# Patient Record
Sex: MALE | Race: WHITE | Employment: OTHER | URBAN - NONMETROPOLITAN AREA
[De-identification: names, ages, dates, MRNs, and addresses within clinical notes are randomized per-mention and may not be internally consistent; named-entity substitution may affect disease eponyms.]

---

## 2019-05-20 PROBLEM — F10.90 ALCOHOL INTAKE ABOVE RECOMMENDED SENSIBLE LIMITS: Status: ACTIVE | Noted: 2019-05-20

## 2019-05-20 PROBLEM — I47.19 MULTIFOCAL ATRIAL TACHYCARDIA: Status: ACTIVE | Noted: 2019-05-20

## 2019-05-20 PROBLEM — Z72.0 TOBACCO USER: Status: ACTIVE | Noted: 2019-05-20

## 2019-05-20 PROBLEM — R91.8 LUNG MASS: Status: ACTIVE | Noted: 2019-05-20

## 2019-05-20 PROBLEM — J44.9 CHRONIC OBSTRUCTIVE PULMONARY DISEASE (HCC): Status: ACTIVE | Noted: 2019-05-20

## 2019-05-20 PROBLEM — I47.1 MULTIFOCAL ATRIAL TACHYCARDIA (HCC): Status: ACTIVE | Noted: 2019-05-20

## 2019-05-20 RX ORDER — IPRATROPIUM BROMIDE AND ALBUTEROL SULFATE 2.5; .5 MG/3ML; MG/3ML
SOLUTION RESPIRATORY (INHALATION)
COMMUNITY
Start: 2018-11-05 | End: 2020-01-06 | Stop reason: SDUPTHER

## 2019-05-20 RX ORDER — DILTIAZEM HYDROCHLORIDE 120 MG/1
120 CAPSULE, EXTENDED RELEASE ORAL DAILY
COMMUNITY
Start: 2018-11-05 | End: 2019-10-14 | Stop reason: SDUPTHER

## 2019-05-20 RX ORDER — ALBUTEROL SULFATE 90 UG/1
AEROSOL, METERED RESPIRATORY (INHALATION)
COMMUNITY
Start: 2018-11-07 | End: 2021-09-27 | Stop reason: SDUPTHER

## 2019-05-20 SDOH — HEALTH STABILITY: MENTAL HEALTH: HOW MANY STANDARD DRINKS CONTAINING ALCOHOL DO YOU HAVE ON A TYPICAL DAY?: 3 OR 4

## 2019-05-20 SDOH — HEALTH STABILITY: MENTAL HEALTH: HOW OFTEN DO YOU HAVE A DRINK CONTAINING ALCOHOL?: 4 OR MORE TIMES A WEEK

## 2019-05-20 NOTE — PROGRESS NOTES
2019    Name: Blanka Jewell : 1955 Sex: male  Age: 75 y.o. Subjective:  Chief Complaint   Patient presents with    Other     6 wk follow up (breathing problems.) ; symptoms improved        HPI: This 75y.o. -year-old male  presents today for evaluation and management of his  chronic medical problems. Current medication list reviewed. The patient is tolerating all medications well without adverse events or known side effects. The patient does understand the risk and benefits of the prescribed medications. Patient was seen here in late March and 2019 with shortness of breath and cough. CT scan of his chest was done which revealed patchy opacifications in both bases suggestive of viral pneumonitis. He also had a stable 15 cm spiculated mass which was felt by pulmonologist to be secondary to scarring. This had not changed in size from last year. After treatment he feels much better. He has an appointment with Dr. Nicolette Vega next Thursday. He wants something less expensive  than the Advair. He will not be on Medicare B until after  2019. He will need some health maintenance done after that. He promises to come back in follow-up. He has a history of chronic hypoxic respiratory failure on oxygen. He only uses the oxygen on a when necessary basis during the day but he does sleep with it at night. He has a history of COPD and uses his inhalers when necessary but he uses his DuoNeb 3 times a day. I told him to just use  DuoNebs  as needed    History of multifocal atrial tachycardia when he was sick. Since then he has had no palpitations. He remains on diltiazem. He quit alcohol in November when he gets sick. Prior to that he was a fairly heavy drinker. He also quit smoking at that time.  For health maintenance he will be due for his aortic screen for aneurysm, he'll need a colonoscopy, he'll need Shimek's, and DTaP, but after 13 was given in 2018 so he wanted \"Pneumovax on November 2019.    He occasionally complains of myalgias and arthralgias as his Hytrin to get up when he gets in his hands and knees. No hot red swollen joints. Review of Systems   Constitutional: Negative for appetite change, fatigue and unexpected weight change. HENT: Negative for congestion, ear pain, facial swelling, rhinorrhea, sore throat, tinnitus and trouble swallowing. Eyes: Negative for photophobia, pain, discharge, itching and visual disturbance. Respiratory: Positive for shortness of breath. Negative for cough, wheezing and stridor. Cardiovascular: Negative for chest pain, palpitations and leg swelling. Gastrointestinal: Negative for abdominal pain, anal bleeding, blood in stool, constipation, diarrhea, nausea and vomiting. Endocrine: Negative for cold intolerance, heat intolerance, polydipsia, polyphagia and polyuria. Genitourinary: Negative for difficulty urinating, dysuria, flank pain, frequency, hematuria and urgency. Musculoskeletal: Positive for arthralgias and myalgias. Negative for gait problem and joint swelling. Skin: Negative for color change, pallor and rash. Allergic/Immunologic: Negative for environmental allergies and food allergies. Neurological: Negative for dizziness, tremors, seizures, syncope, speech difficulty, weakness, light-headedness, numbness and headaches. Hematological: Negative for adenopathy. Does not bruise/bleed easily. Psychiatric/Behavioral: Negative for agitation, behavioral problems, confusion, sleep disturbance and suicidal ideas. The patient is not nervous/anxious.            Current Outpatient Medications:     diltiazem (CARDIZEM 12 HR) 120 MG extended release capsule, Take by mouth, Disp: , Rfl:     fluticasone-salmeterol (ADVAIR DISKUS) 100-50 MCG/DOSE diskus inhaler, Inhale into the lungs, Disp: , Rfl:     ipratropium-albuterol (DUONEB) 0.5-2.5 (3) MG/3ML SOLN nebulizer solution, , Disp: , Rfl:     albuterol sulfate HFA (PROAIR HFA) 108 (80 Base) MCG/ACT inhaler, Inhale into the lungs, Disp: , Rfl:      No Known Allergies     Past Medical History:   Diagnosis Date    Alcohol abuse     COPD (chronic obstructive pulmonary disease) (HCC)     Nicotine dependence        Health Maintenance Due   Topic Date Due    AAA screen  1955    Hepatitis C screen  1955    DTaP/Tdap/Td vaccine (1 - Tdap) 1972    Lipid screen  1993    Shingles Vaccine (1 of 2) 2003    Colon cancer screen colonoscopy  2003    Low dose CT lung screening  2008        Patient Active Problem List   Diagnosis    Chronic obstructive pulmonary disease ([facility] [address]    Alcohol intake above recommended sensible limits    Lung mass    Multifocal atrial tachycardia ([facility] [address]    Tobacco user        History reviewed. No pertinent surgical history. Family History   Problem Relation Age of Onset    No Known Problems Mother     Heart Attack Father         Social History     Tobacco Use    Smoking status: Former Smoker     Packs/day: 1.00     Years: 40.00     Pack years: 40.00     Last attempt to quit: 2018     Years since quittin.3    Smokeless tobacco: Never Used   Substance Use Topics    Alcohol use: Not Currently     Frequency: Never    Drug use: Never        Objective  Vitals:    19 1308   BP: 122/70   Site: Left Upper Arm   Position: Sitting   Cuff Size: Large Adult   Pulse: 89   Resp: 18   Temp: 98.6 °F (37 °C)   TempSrc: Temporal   SpO2: 96%  Comment: Pt is currently on O2, 2LPM.   Weight: 150 lb 3.2 oz (68.1 kg)   Height: 5' 7\" (1.702 m)        Exam:  Physical Exam   Constitutional: He is oriented to person, place, and time. He appears well-developed and well-nourished. HENT:   Head: Normocephalic. Right Ear: External ear normal.   Left Ear: External ear normal.   Nose: Nose normal.   Mouth/Throat: Oropharynx is clear and moist. No oropharyngeal exudate. Eyes: Pupils are equal, round, and reactive to light.  Conjunctivae and EOM are normal. Right eye exhibits no discharge. Left eye exhibits no discharge. No scleral icterus. Neck: Normal range of motion. Neck supple. No thyromegaly present. Cardiovascular: Normal rate, regular rhythm, normal heart sounds and intact distal pulses. Exam reveals no gallop and no friction rub. No murmur heard. Pulmonary/Chest: Effort normal. No respiratory distress. He has decreased breath sounds. He has no wheezes. He has no rales. He exhibits no tenderness. Abdominal: Soft. Bowel sounds are normal. He exhibits no distension and no mass. There is no tenderness. There is no rebound and no guarding. Musculoskeletal: Normal range of motion. He exhibits no edema, tenderness or deformity. Lymphadenopathy:     He has no cervical adenopathy. Neurological: He is alert and oriented to person, place, and time. He displays normal reflexes. No cranial nerve deficit or sensory deficit. Skin: Skin is warm and dry. No rash noted. No erythema. No pallor. Psychiatric: He has a normal mood and affect. His behavior is normal. Judgment and thought content normal.        Last labs reviewed. ASSESSMENT & PLAN :   Problem List        Circulatory    Multifocal atrial tachycardia (HCC)     Continue diltiazem         Relevant Medications    diltiazem (CARDIZEM 12 HR) 120 MG extended release capsule       Respiratory    Chronic obstructive pulmonary disease ([address]     He is to use Duonebs when necessary. Talk with pulmonologist about a less expensive substitution for Advair. Continue using his oxygen as directed.  Continue staying off his alcohol and cigarettes         Relevant Medications    albuterol sulfate HFA (PROAIR HFA) 108 (90 Base) MCG/ACT inhaler    fluticasone-salmeterol (ADVAIR DISKUS) 100-50 MCG/DOSE diskus inhaler    ipratropium-albuterol (DUONEB) 0.5-2.5 (3) MG/3ML SOLN nebulizer solution       Other    Lung mass     Stable on most recent CT of his chest. Will continue to monitor Return in about 3 months (around 8/21/2019).        Julio C Azevedo, DO  5/21/2019

## 2019-05-21 ENCOUNTER — OFFICE VISIT (OUTPATIENT)
Dept: PRIMARY CARE CLINIC | Age: 64
End: 2019-05-21
Payer: COMMERCIAL

## 2019-05-21 VITALS
OXYGEN SATURATION: 96 % | RESPIRATION RATE: 18 BRPM | HEIGHT: 67 IN | SYSTOLIC BLOOD PRESSURE: 122 MMHG | WEIGHT: 150.2 LBS | DIASTOLIC BLOOD PRESSURE: 70 MMHG | BODY MASS INDEX: 23.57 KG/M2 | HEART RATE: 89 BPM | TEMPERATURE: 98.6 F

## 2019-05-21 DIAGNOSIS — R91.8 LUNG MASS: ICD-10-CM

## 2019-05-21 DIAGNOSIS — I47.1 MULTIFOCAL ATRIAL TACHYCARDIA (HCC): ICD-10-CM

## 2019-05-21 DIAGNOSIS — J44.9 CHRONIC OBSTRUCTIVE PULMONARY DISEASE, UNSPECIFIED COPD TYPE (HCC): ICD-10-CM

## 2019-05-21 PROCEDURE — 99213 OFFICE O/P EST LOW 20 MIN: CPT | Performed by: INTERNAL MEDICINE

## 2019-05-21 SDOH — HEALTH STABILITY: MENTAL HEALTH: HOW OFTEN DO YOU HAVE A DRINK CONTAINING ALCOHOL?: NEVER

## 2019-05-21 SDOH — HEALTH STABILITY: MENTAL HEALTH: HOW MANY STANDARD DRINKS CONTAINING ALCOHOL DO YOU HAVE ON A TYPICAL DAY?: NOT ASKED

## 2019-05-21 ASSESSMENT — PATIENT HEALTH QUESTIONNAIRE - PHQ9
SUM OF ALL RESPONSES TO PHQ9 QUESTIONS 1 & 2: 0
SUM OF ALL RESPONSES TO PHQ QUESTIONS 1-9: 0
1. LITTLE INTEREST OR PLEASURE IN DOING THINGS: 0
2. FEELING DOWN, DEPRESSED OR HOPELESS: 0
SUM OF ALL RESPONSES TO PHQ QUESTIONS 1-9: 0

## 2019-05-21 ASSESSMENT — ENCOUNTER SYMPTOMS
EYE DISCHARGE: 0
EYE PAIN: 0
FACIAL SWELLING: 0
DIARRHEA: 0
ABDOMINAL PAIN: 0
COLOR CHANGE: 0
EYE ITCHING: 0
SORE THROAT: 0
CONSTIPATION: 0
PHOTOPHOBIA: 0
SHORTNESS OF BREATH: 1
NAUSEA: 0
VOMITING: 0
TROUBLE SWALLOWING: 0
ANAL BLEEDING: 0
RHINORRHEA: 0
COUGH: 0
STRIDOR: 0
WHEEZING: 0
BLOOD IN STOOL: 0

## 2019-05-21 NOTE — ASSESSMENT & PLAN NOTE
He is to use Duonebs when necessary. Talk with pulmonologist about a less expensive substitution for Advair. Continue using his oxygen as directed.  Continue staying off his alcohol and cigarettes

## 2019-08-23 RX ORDER — DOXYCYCLINE 100 MG/1
100 TABLET ORAL 2 TIMES DAILY
COMMUNITY
End: 2019-08-27

## 2019-08-23 RX ORDER — CEFUROXIME AXETIL 500 MG/1
500 TABLET ORAL 2 TIMES DAILY
COMMUNITY
End: 2019-08-27 | Stop reason: ALTCHOICE

## 2019-08-27 ENCOUNTER — OFFICE VISIT (OUTPATIENT)
Dept: PRIMARY CARE CLINIC | Age: 64
End: 2019-08-27
Payer: COMMERCIAL

## 2019-08-27 ENCOUNTER — HOSPITAL ENCOUNTER (OUTPATIENT)
Age: 64
Discharge: HOME | End: 2019-08-29
Payer: COMMERCIAL

## 2019-08-27 VITALS
SYSTOLIC BLOOD PRESSURE: 132 MMHG | BODY MASS INDEX: 23.95 KG/M2 | WEIGHT: 158 LBS | HEART RATE: 70 BPM | DIASTOLIC BLOOD PRESSURE: 72 MMHG | TEMPERATURE: 98.5 F | OXYGEN SATURATION: 95 % | RESPIRATION RATE: 18 BRPM | HEIGHT: 68 IN

## 2019-08-27 DIAGNOSIS — Z13.6 SCREENING FOR AAA (ABDOMINAL AORTIC ANEURYSM): ICD-10-CM

## 2019-08-27 DIAGNOSIS — E78.2 MIXED HYPERLIPIDEMIA: ICD-10-CM

## 2019-08-27 DIAGNOSIS — I73.9 PAD (PERIPHERAL ARTERY DISEASE) (HCC): ICD-10-CM

## 2019-08-27 DIAGNOSIS — I73.9 PERIPHERAL VASCULAR DISEASE (HCC): ICD-10-CM

## 2019-08-27 DIAGNOSIS — J44.9 CHRONIC OBSTRUCTIVE PULMONARY DISEASE, UNSPECIFIED COPD TYPE (HCC): ICD-10-CM

## 2019-08-27 DIAGNOSIS — J44.9 CHRONIC OBSTRUCTIVE PULMONARY DISEASE, UNSPECIFIED COPD TYPE (HCC): Primary | ICD-10-CM

## 2019-08-27 DIAGNOSIS — I47.1 MULTIFOCAL ATRIAL TACHYCARDIA (HCC): ICD-10-CM

## 2019-08-27 DIAGNOSIS — R91.8 LUNG MASS: ICD-10-CM

## 2019-08-27 LAB
ALBUMIN SERPL-MCNC: 4.5 G/DL (ref 3.5–5.2)
ALP BLD-CCNC: 56 U/L (ref 40–129)
ALT SERPL-CCNC: 13 U/L (ref 0–40)
ANION GAP SERPL CALCULATED.3IONS-SCNC: 14 MMOL/L (ref 7–16)
AST SERPL-CCNC: 17 U/L (ref 0–39)
BILIRUB SERPL-MCNC: 0.4 MG/DL (ref 0–1.2)
BUN BLDV-MCNC: 16 MG/DL (ref 8–23)
CALCIUM SERPL-MCNC: 9.8 MG/DL (ref 8.6–10.2)
CHLORIDE BLD-SCNC: 101 MMOL/L (ref 98–107)
CHOLESTEROL, TOTAL: 207 MG/DL (ref 0–199)
CO2: 25 MMOL/L (ref 22–29)
CREAT SERPL-MCNC: 0.8 MG/DL (ref 0.7–1.2)
GFR AFRICAN AMERICAN: >60
GFR NON-AFRICAN AMERICAN: >60 ML/MIN/1.73
GLUCOSE BLD-MCNC: 100 MG/DL (ref 74–99)
HDLC SERPL-MCNC: 47 MG/DL
LDL CHOLESTEROL CALCULATED: 142 MG/DL (ref 0–99)
POTASSIUM SERPL-SCNC: 4.6 MMOL/L (ref 3.5–5)
SODIUM BLD-SCNC: 140 MMOL/L (ref 132–146)
TOTAL PROTEIN: 7.6 G/DL (ref 6.4–8.3)
TRIGL SERPL-MCNC: 88 MG/DL (ref 0–149)
VLDLC SERPL CALC-MCNC: 18 MG/DL

## 2019-08-27 PROCEDURE — 36415 COLL VENOUS BLD VENIPUNCTURE: CPT

## 2019-08-27 PROCEDURE — 80053 COMPREHEN METABOLIC PANEL: CPT

## 2019-08-27 PROCEDURE — 80061 LIPID PANEL: CPT

## 2019-08-27 PROCEDURE — 99214 OFFICE O/P EST MOD 30 MIN: CPT | Performed by: INTERNAL MEDICINE

## 2019-08-27 ASSESSMENT — ENCOUNTER SYMPTOMS
DIARRHEA: 0
TROUBLE SWALLOWING: 0
SHORTNESS OF BREATH: 0
STRIDOR: 0
BLOOD IN STOOL: 0
RHINORRHEA: 0
FACIAL SWELLING: 0
COUGH: 0
SORE THROAT: 0
PHOTOPHOBIA: 0
EYE PAIN: 0
ANAL BLEEDING: 0
VOMITING: 0
COLOR CHANGE: 0
CONSTIPATION: 1
NAUSEA: 0
ABDOMINAL PAIN: 0
EYE ITCHING: 0
WHEEZING: 0
EYE DISCHARGE: 0

## 2019-08-27 NOTE — PROGRESS NOTES
sensible limits    Lung mass    Multifocal atrial tachycardia (HCC)    Tobacco user    Screening for AAA (abdominal aortic aneurysm)    PAD (peripheral artery disease) (HCC)    Mixed hyperlipidemia        No past surgical history on file. Family History   Problem Relation Age of Onset    No Known Problems Mother     Heart Attack Father         Social History     Tobacco Use    Smoking status: Former Smoker     Packs/day: 1.00     Years: 40.00     Pack years: 40.00     Last attempt to quit: 2018     Years since quittin.6    Smokeless tobacco: Never Used   Substance Use Topics    Alcohol use: Not Currently     Frequency: Never    Drug use: Never        Objective  Vitals:    19 0911   BP: 132/72   Site: Left Upper Arm   Position: Sitting   Cuff Size: Large Adult   Pulse: 70   Resp: 18   Temp: 98.5 °F (36.9 °C)   TempSrc: Temporal   SpO2: 95%  Comment: Patient is on 2LPM, O2   Weight: 158 lb (71.7 kg)   Height: 5' 8\" (1.727 m)        Exam:  Physical Exam   Constitutional: He is oriented to person, place, and time. He appears well-developed and well-nourished. HENT:   Head: Normocephalic. Right Ear: External ear normal.   Left Ear: External ear normal.   Nose: Nose normal.   Mouth/Throat: Oropharynx is clear and moist. No oropharyngeal exudate. Eyes: Pupils are equal, round, and reactive to light. Conjunctivae and EOM are normal. Right eye exhibits no discharge. Left eye exhibits no discharge. No scleral icterus. Neck: Normal range of motion. Neck supple. No thyromegaly present. Cardiovascular: Normal rate, regular rhythm and normal heart sounds. Exam reveals no gallop and no friction rub. No murmur heard. Peripheral pulses are lightly decreased, DP and PT   Pulmonary/Chest: Effort normal. No respiratory distress. He has decreased breath sounds. He has no wheezes. He has no rales. He exhibits no tenderness. Decreased breath sounds   Abdominal: Soft.  Bowel sounds are normal. He

## 2019-09-12 ENCOUNTER — TELEPHONE (OUTPATIENT)
Dept: PRIMARY CARE CLINIC | Age: 64
End: 2019-09-12

## 2019-09-12 DIAGNOSIS — I73.9 PERIPHERAL VASCULAR DISEASE (HCC): ICD-10-CM

## 2019-09-12 DIAGNOSIS — I73.9 PERIPHERAL VASCULAR DISEASE (HCC): Primary | ICD-10-CM

## 2019-09-16 DIAGNOSIS — Z13.6 SCREENING FOR AAA (ABDOMINAL AORTIC ANEURYSM): ICD-10-CM

## 2019-09-17 ENCOUNTER — TELEPHONE (OUTPATIENT)
Dept: ADMINISTRATIVE | Age: 64
End: 2019-09-17

## 2019-09-17 DIAGNOSIS — R91.8 LUNG MASS: Primary | ICD-10-CM

## 2019-09-18 ENCOUNTER — TELEPHONE (OUTPATIENT)
Dept: PRIMARY CARE CLINIC | Age: 64
End: 2019-09-18

## 2019-09-20 DIAGNOSIS — I70.213 ATHEROSCLEROSIS OF NATIVE ARTERY OF BOTH LOWER EXTREMITIES WITH INTERMITTENT CLAUDICATION (HCC): Primary | ICD-10-CM

## 2019-09-26 PROBLEM — Z13.6 SCREENING FOR AAA (ABDOMINAL AORTIC ANEURYSM): Status: RESOLVED | Noted: 2019-08-27 | Resolved: 2019-09-26

## 2019-09-30 ENCOUNTER — TELEPHONE (OUTPATIENT)
Dept: PRIMARY CARE CLINIC | Age: 64
End: 2019-09-30

## 2019-09-30 DIAGNOSIS — G62.9 NEUROPATHY: Primary | ICD-10-CM

## 2019-10-01 RX ORDER — GABAPENTIN 100 MG/1
100 CAPSULE ORAL NIGHTLY
Qty: 90 CAPSULE | Refills: 1 | Status: SHIPPED | OUTPATIENT
Start: 2019-10-01 | End: 2019-10-14

## 2019-10-14 ENCOUNTER — OFFICE VISIT (OUTPATIENT)
Dept: PRIMARY CARE CLINIC | Age: 64
End: 2019-10-14
Payer: COMMERCIAL

## 2019-10-14 VITALS
RESPIRATION RATE: 16 BRPM | WEIGHT: 162 LBS | HEART RATE: 83 BPM | TEMPERATURE: 98.8 F | BODY MASS INDEX: 24.55 KG/M2 | HEIGHT: 68 IN | DIASTOLIC BLOOD PRESSURE: 68 MMHG | SYSTOLIC BLOOD PRESSURE: 118 MMHG | OXYGEN SATURATION: 95 %

## 2019-10-14 DIAGNOSIS — J44.9 CHRONIC OBSTRUCTIVE PULMONARY DISEASE, UNSPECIFIED COPD TYPE (HCC): ICD-10-CM

## 2019-10-14 DIAGNOSIS — M54.16 LUMBAR RADICULOPATHY: ICD-10-CM

## 2019-10-14 DIAGNOSIS — M47.816 LUMBAR ARTHROPATHY: ICD-10-CM

## 2019-10-14 DIAGNOSIS — I47.1 MULTIFOCAL ATRIAL TACHYCARDIA (HCC): Primary | ICD-10-CM

## 2019-10-14 DIAGNOSIS — E78.2 MIXED HYPERLIPIDEMIA: ICD-10-CM

## 2019-10-14 DIAGNOSIS — Z23 NEED FOR INFLUENZA VACCINATION: ICD-10-CM

## 2019-10-14 PROCEDURE — 90653 IIV ADJUVANT VACCINE IM: CPT | Performed by: INTERNAL MEDICINE

## 2019-10-14 PROCEDURE — 99214 OFFICE O/P EST MOD 30 MIN: CPT | Performed by: INTERNAL MEDICINE

## 2019-10-14 PROCEDURE — G0008 ADMIN INFLUENZA VIRUS VAC: HCPCS | Performed by: INTERNAL MEDICINE

## 2019-10-14 RX ORDER — DILTIAZEM HYDROCHLORIDE 120 MG/1
120 CAPSULE, EXTENDED RELEASE ORAL DAILY
Qty: 90 CAPSULE | Refills: 3 | Status: SHIPPED
Start: 2019-10-14 | End: 2020-06-11 | Stop reason: ALTCHOICE

## 2019-10-14 RX ORDER — GABAPENTIN 300 MG/1
CAPSULE ORAL
Qty: 90 CAPSULE | Refills: 5 | Status: SHIPPED | OUTPATIENT
Start: 2019-10-14 | End: 2019-11-07

## 2019-10-14 ASSESSMENT — ENCOUNTER SYMPTOMS
EYE ITCHING: 0
NAUSEA: 0
COLOR CHANGE: 0
BLOOD IN STOOL: 0
EYE DISCHARGE: 0
WHEEZING: 0
CONSTIPATION: 1
RHINORRHEA: 0
EYE PAIN: 0
FACIAL SWELLING: 0
ABDOMINAL PAIN: 0
VOMITING: 0
TROUBLE SWALLOWING: 0
SHORTNESS OF BREATH: 0
PHOTOPHOBIA: 0
COUGH: 0
DIARRHEA: 0
ANAL BLEEDING: 0
SORE THROAT: 0
STRIDOR: 0

## 2019-10-25 ENCOUNTER — TELEPHONE (OUTPATIENT)
Dept: PRIMARY CARE CLINIC | Age: 64
End: 2019-10-25

## 2019-10-25 DIAGNOSIS — M54.50 LUMBAR PAIN: Primary | ICD-10-CM

## 2019-10-25 DIAGNOSIS — M47.816 LUMBAR ARTHROPATHY: ICD-10-CM

## 2019-11-07 DIAGNOSIS — M54.16 LUMBAR RADICULOPATHY: Primary | ICD-10-CM

## 2019-11-07 RX ORDER — GABAPENTIN 300 MG/1
300 CAPSULE ORAL 3 TIMES DAILY
Qty: 270 CAPSULE | Refills: 1 | Status: SHIPPED | OUTPATIENT
Start: 2019-11-07 | End: 2019-12-09 | Stop reason: SDUPTHER

## 2019-11-13 PROBLEM — Z23 NEED FOR INFLUENZA VACCINATION: Status: RESOLVED | Noted: 2019-10-14 | Resolved: 2019-11-13

## 2019-11-25 LAB — FECAL BLOOD IMMUNOCHEMICAL TEST: NORMAL

## 2019-12-04 ASSESSMENT — ENCOUNTER SYMPTOMS
VOMITING: 0
WHEEZING: 0
COUGH: 0
DIARRHEA: 0
STRIDOR: 0
ABDOMINAL PAIN: 0
BLOOD IN STOOL: 0
FACIAL SWELLING: 0
TROUBLE SWALLOWING: 0
RHINORRHEA: 0
SHORTNESS OF BREATH: 0
CONSTIPATION: 1
EYE ITCHING: 0
COLOR CHANGE: 0
ANAL BLEEDING: 0
EYE PAIN: 0
EYE DISCHARGE: 0
NAUSEA: 0
SORE THROAT: 0
PHOTOPHOBIA: 0

## 2019-12-09 ENCOUNTER — OFFICE VISIT (OUTPATIENT)
Dept: PRIMARY CARE CLINIC | Age: 64
End: 2019-12-09
Payer: COMMERCIAL

## 2019-12-09 VITALS
HEIGHT: 68 IN | DIASTOLIC BLOOD PRESSURE: 68 MMHG | TEMPERATURE: 98.4 F | BODY MASS INDEX: 24.55 KG/M2 | TEMPERATURE: 98.4 F | OXYGEN SATURATION: 95 % | RESPIRATION RATE: 16 BRPM | DIASTOLIC BLOOD PRESSURE: 68 MMHG | WEIGHT: 162 LBS | RESPIRATION RATE: 16 BRPM | HEART RATE: 92 BPM | WEIGHT: 162 LBS | HEIGHT: 68 IN | SYSTOLIC BLOOD PRESSURE: 120 MMHG | OXYGEN SATURATION: 95 % | BODY MASS INDEX: 24.55 KG/M2 | HEART RATE: 92 BPM | SYSTOLIC BLOOD PRESSURE: 120 MMHG

## 2019-12-09 DIAGNOSIS — R10.9: ICD-10-CM

## 2019-12-09 DIAGNOSIS — R91.8 LUNG MASS: ICD-10-CM

## 2019-12-09 DIAGNOSIS — M54.16 LUMBAR RADICULOPATHY: ICD-10-CM

## 2019-12-09 DIAGNOSIS — J44.1 CHRONIC OBSTRUCTIVE PULMONARY DISEASE WITH ACUTE EXACERBATION (HCC): ICD-10-CM

## 2019-12-09 DIAGNOSIS — K52.9 RIGHT SIDED COLITIS: Primary | ICD-10-CM

## 2019-12-09 DIAGNOSIS — E78.2 MIXED HYPERLIPIDEMIA: ICD-10-CM

## 2019-12-09 DIAGNOSIS — Z00.00 ROUTINE GENERAL MEDICAL EXAMINATION AT A HEALTH CARE FACILITY: ICD-10-CM

## 2019-12-09 DIAGNOSIS — Z23 NEED FOR VACCINATION WITH PVAX (PNEUMOCOCCAL VACCINATION): ICD-10-CM

## 2019-12-09 DIAGNOSIS — T50.905A HEMATOCHEZIA DUE TO MEDICATION: ICD-10-CM

## 2019-12-09 DIAGNOSIS — Z71.89 ACP (ADVANCE CARE PLANNING): ICD-10-CM

## 2019-12-09 DIAGNOSIS — K92.1 HEMATOCHEZIA DUE TO MEDICATION: ICD-10-CM

## 2019-12-09 PROBLEM — F17.200 NICOTINE DEPENDENCE: Status: RESOLVED | Noted: 2019-12-09 | Resolved: 2019-12-09

## 2019-12-09 PROBLEM — R91.1 NODULE OF UPPER LOBE OF LEFT LUNG: Status: ACTIVE | Noted: 2019-12-09

## 2019-12-09 PROBLEM — D72.829 HYPERLEUKOCYTOSIS: Status: ACTIVE | Noted: 2019-12-09

## 2019-12-09 PROBLEM — Z72.0 TOBACCO USER: Status: RESOLVED | Noted: 2019-05-20 | Resolved: 2019-12-09

## 2019-12-09 PROBLEM — F10.90 ALCOHOL INTAKE ABOVE RECOMMENDED SENSIBLE LIMITS: Status: RESOLVED | Noted: 2019-05-20 | Resolved: 2019-12-09

## 2019-12-09 PROCEDURE — 99213 OFFICE O/P EST LOW 20 MIN: CPT | Performed by: INTERNAL MEDICINE

## 2019-12-09 PROCEDURE — G0438 PPPS, INITIAL VISIT: HCPCS | Performed by: INTERNAL MEDICINE

## 2019-12-09 PROCEDURE — 90732 PPSV23 VACC 2 YRS+ SUBQ/IM: CPT | Performed by: INTERNAL MEDICINE

## 2019-12-09 PROCEDURE — G0009 ADMIN PNEUMOCOCCAL VACCINE: HCPCS | Performed by: INTERNAL MEDICINE

## 2019-12-09 RX ORDER — GABAPENTIN 300 MG/1
300 CAPSULE ORAL 3 TIMES DAILY
Qty: 270 CAPSULE | Refills: 1 | Status: SHIPPED
Start: 2019-12-09 | End: 2020-08-13 | Stop reason: SDUPTHER

## 2019-12-09 RX ORDER — METRONIDAZOLE 500 MG/1
TABLET ORAL
COMMUNITY
Start: 2019-11-30 | End: 2020-03-11 | Stop reason: ALTCHOICE

## 2019-12-09 ASSESSMENT — LIFESTYLE VARIABLES: HOW OFTEN DO YOU HAVE A DRINK CONTAINING ALCOHOL: 0

## 2019-12-09 ASSESSMENT — PATIENT HEALTH QUESTIONNAIRE - PHQ9
SUM OF ALL RESPONSES TO PHQ QUESTIONS 1-9: 0
SUM OF ALL RESPONSES TO PHQ QUESTIONS 1-9: 0

## 2020-01-06 RX ORDER — IPRATROPIUM BROMIDE AND ALBUTEROL SULFATE 2.5; .5 MG/3ML; MG/3ML
1 SOLUTION RESPIRATORY (INHALATION) EVERY 6 HOURS PRN
Qty: 360 ML | Refills: 3 | Status: SHIPPED
Start: 2020-01-06 | End: 2021-09-27 | Stop reason: SDUPTHER

## 2020-01-07 NOTE — TELEPHONE ENCOUNTER
Patient requesting Advair refill. Pended below.         Electronically signed by Kalen Christine LPN on 4/9/29 at 0:28 PM

## 2020-03-11 ENCOUNTER — OFFICE VISIT (OUTPATIENT)
Dept: PRIMARY CARE CLINIC | Age: 65
End: 2020-03-11
Payer: COMMERCIAL

## 2020-03-11 ENCOUNTER — HOSPITAL ENCOUNTER (OUTPATIENT)
Age: 65
Discharge: HOME | End: 2020-03-13
Payer: COMMERCIAL

## 2020-03-11 VITALS
WEIGHT: 174 LBS | SYSTOLIC BLOOD PRESSURE: 132 MMHG | HEART RATE: 79 BPM | TEMPERATURE: 97.1 F | OXYGEN SATURATION: 95 % | BODY MASS INDEX: 27.31 KG/M2 | HEIGHT: 67 IN | DIASTOLIC BLOOD PRESSURE: 74 MMHG

## 2020-03-11 PROBLEM — R10.9 ABDOMINAL PAIN NOT CAUSED BY TRAUMA: Status: RESOLVED | Noted: 2019-12-09 | Resolved: 2020-03-11

## 2020-03-11 PROBLEM — J44.1 CHRONIC OBSTRUCTIVE PULMONARY DISEASE WITH ACUTE EXACERBATION (HCC): Status: RESOLVED | Noted: 2019-12-09 | Resolved: 2020-03-11

## 2020-03-11 PROBLEM — K92.1 HEMATOCHEZIA DUE TO MEDICATION: Status: RESOLVED | Noted: 2019-12-09 | Resolved: 2020-03-11

## 2020-03-11 PROBLEM — D72.829 HYPERLEUKOCYTOSIS: Status: RESOLVED | Noted: 2019-12-09 | Resolved: 2020-03-11

## 2020-03-11 PROBLEM — T50.905A HEMATOCHEZIA DUE TO MEDICATION: Status: RESOLVED | Noted: 2019-12-09 | Resolved: 2020-03-11

## 2020-03-11 PROBLEM — K52.9 RIGHT SIDED COLITIS: Status: RESOLVED | Noted: 2019-12-09 | Resolved: 2020-03-11

## 2020-03-11 PROBLEM — R73.9 HYPERGLYCEMIA: Status: ACTIVE | Noted: 2020-03-11

## 2020-03-11 PROBLEM — R60.0 LOCALIZED EDEMA: Status: ACTIVE | Noted: 2020-03-11

## 2020-03-11 LAB
ANION GAP SERPL CALCULATED.3IONS-SCNC: 9 MMOL/L (ref 7–16)
BASOPHILS ABSOLUTE: 0.05 E9/L (ref 0–0.2)
BASOPHILS RELATIVE PERCENT: 0.8 % (ref 0–2)
BUN BLDV-MCNC: 12 MG/DL (ref 8–23)
CALCIUM SERPL-MCNC: 9.7 MG/DL (ref 8.6–10.2)
CHLORIDE BLD-SCNC: 99 MMOL/L (ref 98–107)
CO2: 28 MMOL/L (ref 22–29)
CREAT SERPL-MCNC: 0.8 MG/DL (ref 0.7–1.2)
EOSINOPHILS ABSOLUTE: 0.26 E9/L (ref 0.05–0.5)
EOSINOPHILS RELATIVE PERCENT: 4 % (ref 0–6)
GFR AFRICAN AMERICAN: >60
GFR NON-AFRICAN AMERICAN: >60 ML/MIN/1.73
GLUCOSE BLD-MCNC: 96 MG/DL (ref 74–99)
HCT VFR BLD CALC: 43.5 % (ref 37–54)
HEMOGLOBIN: 13.7 G/DL (ref 12.5–16.5)
IMMATURE GRANULOCYTES #: 0.03 E9/L
IMMATURE GRANULOCYTES %: 0.5 % (ref 0–5)
LYMPHOCYTES ABSOLUTE: 1.56 E9/L (ref 1.5–4)
LYMPHOCYTES RELATIVE PERCENT: 24.2 % (ref 20–42)
MCH RBC QN AUTO: 30.2 PG (ref 26–35)
MCHC RBC AUTO-ENTMCNC: 31.5 % (ref 32–34.5)
MCV RBC AUTO: 95.8 FL (ref 80–99.9)
MONOCYTES ABSOLUTE: 0.68 E9/L (ref 0.1–0.95)
MONOCYTES RELATIVE PERCENT: 10.5 % (ref 2–12)
NEUTROPHILS ABSOLUTE: 3.87 E9/L (ref 1.8–7.3)
NEUTROPHILS RELATIVE PERCENT: 60 % (ref 43–80)
PDW BLD-RTO: 13.8 FL (ref 11.5–15)
PLATELET # BLD: 236 E9/L (ref 130–450)
PMV BLD AUTO: 10.3 FL (ref 7–12)
POTASSIUM SERPL-SCNC: 4.6 MMOL/L (ref 3.5–5)
RBC # BLD: 4.54 E12/L (ref 3.8–5.8)
SODIUM BLD-SCNC: 136 MMOL/L (ref 132–146)
WBC # BLD: 6.5 E9/L (ref 4.5–11.5)

## 2020-03-11 PROCEDURE — 99214 OFFICE O/P EST MOD 30 MIN: CPT | Performed by: INTERNAL MEDICINE

## 2020-03-11 PROCEDURE — 80048 BASIC METABOLIC PNL TOTAL CA: CPT

## 2020-03-11 PROCEDURE — 36415 COLL VENOUS BLD VENIPUNCTURE: CPT

## 2020-03-11 PROCEDURE — 85025 COMPLETE CBC W/AUTO DIFF WBC: CPT

## 2020-03-11 RX ORDER — FUROSEMIDE 20 MG/1
20 TABLET ORAL DAILY PRN
Qty: 30 TABLET | Refills: 5 | Status: SHIPPED
Start: 2020-03-11 | End: 2021-06-22 | Stop reason: SDUPTHER

## 2020-03-11 ASSESSMENT — PATIENT HEALTH QUESTIONNAIRE - PHQ9
2. FEELING DOWN, DEPRESSED OR HOPELESS: 0
SUM OF ALL RESPONSES TO PHQ QUESTIONS 1-9: 0
SUM OF ALL RESPONSES TO PHQ QUESTIONS 1-9: 0
1. LITTLE INTEREST OR PLEASURE IN DOING THINGS: 0
SUM OF ALL RESPONSES TO PHQ9 QUESTIONS 1 & 2: 0

## 2020-03-11 ASSESSMENT — ENCOUNTER SYMPTOMS
EYE ITCHING: 0
DIARRHEA: 0
FACIAL SWELLING: 0
ABDOMINAL PAIN: 0
BLOOD IN STOOL: 0
EYE PAIN: 0
COUGH: 0
WHEEZING: 0
ANAL BLEEDING: 0
PHOTOPHOBIA: 0
COLOR CHANGE: 0
CONSTIPATION: 0
TROUBLE SWALLOWING: 0
EYE DISCHARGE: 0
SORE THROAT: 0
VOMITING: 0
RHINORRHEA: 0
STRIDOR: 0
NAUSEA: 0
SHORTNESS OF BREATH: 1

## 2020-03-11 NOTE — ASSESSMENT & PLAN NOTE
Trial furosemide 20 mg daily as needed for swelling. Try not to take it every day. When he takes it increases potassium containing foods.   When he returns we will check his BMP and magnesium

## 2020-03-11 NOTE — ASSESSMENT & PLAN NOTE
Continue monitoring as per pulmonary.   CT chest noncontrast to be done before his next visit with pulmonary

## 2020-03-11 NOTE — ASSESSMENT & PLAN NOTE
Reviewed Dr. Kami Thomas note.   He recommended he stay on his diltiazem both for blood pressure control and for control of his paroxysmal tachycardia

## 2020-03-11 NOTE — PROGRESS NOTES
3/11/2020    Name: Anay Langford : 1955 Sex: male  Age: 75 y.o. Subjective:  Chief Complaint   Patient presents with    Shortness of Breath        HPI       He saw GI for evaluation of his hematochezia and abdominal pain. Symptoms resolved. Colonoscopy was done on 2020 and did not show any polyps. This showed diverticulosis and nonbleeding hemorrhoids. Patient has a history of COPD under the care of Dr. Elizabeth Lal. He also has chronic hypoxic respiratory failure on oxygen . When previously seen at pulmonary office he failed his  6-minute walk test.  He will be following up with pulmonary in 2019. He will stay on his oxygen until then. He tells me that he takes it off occasionally when he goes and works outside, when he comes back his O2 saturation on room air is over 90%. We will let pulmonary decide whether to discontinue his oxygen. CT scan of his chest last year revealed spiculated lesion in the right lung which is unchanged. They thought it might be scarring from his previous pneumonia. He will have a recheck noncontrast CT scan of his chest  before he sees Dr. Elizabeth Lal this summer. Reviewed Dr. Ryan Acevedo last office visit note in 2019. He has a history of multifocal atrial tachycardia while he was in the hospital.  He was placed on diltiazem and he has noticed that his ankles are more swollen at the end of the day. No worsening shortness of breath. He saw cardiology for follow-up in 2019. Reviewed Dr. Priyanka Bassett office visit note    He complains of pain in his feet and calves when he walks. Relieved by rest.  He noticed that the soles of his feet are very sensitive but he says that it has been that way since he was a child. Because of his previous history of nicotine use, will need to make sure he does not have peripheral vascular disease. He had an ultrasound of his abdominal aorta to rule out aortic aneurysm because he was a heavy smoker.  No sign of any MCG/ACT inhaler, Inhale into the lungs, Disp: , Rfl:      No Known Allergies     Past Medical History:   Diagnosis Date    Alcohol abuse     Alcohol intake above recommended sensible limits 2019    COPD (chronic obstructive pulmonary disease) (HCC)     Hematochezia due to medication 2019    Multifocal atrial tachycardia (HCC)     paroxysmal    Nicotine dependence     Right sided colitis 2019       Health Maintenance Due   Topic Date Due    Hepatitis C screen  1955    DTaP/Tdap/Td vaccine (1 - Tdap) 1972    Diabetes screen  1993    Shingles Vaccine (1 of 2) 2003        Patient Active Problem List   Diagnosis    Lung mass    Multifocal atrial tachycardia (HCC)    Mixed hyperlipidemia    Lumbar arthropathy    Lumbar radiculopathy    Leukocytosis    Nodule of upper lobe of left lung    Need for vaccination with PVAX (pneumococcal vaccination)    COPD [facility]) ([facility] [address]    Essential hypertension    Need for shingles vaccine    Need for tetanus, diphtheria, and acellular pertussis (Tdap) vaccine    Hyperglycemia    Localized edema        Past Surgical History:   Procedure Laterality Date    TONSILLECTOMY          Family History   Problem Relation Age of Onset    No Known Problems Mother     Heart Attack Father         Social History     Tobacco Use    Smoking status: Former Smoker     Packs/day: 1.00     Years: 40.00     Pack years: 40.00     Last attempt to quit: 2018     Years since quittin.1    Smokeless tobacco: Never Used   Substance Use Topics    Alcohol use: Not Currently     Frequency: Never    Drug use: Never        Objective  Vitals:    20 1029   BP: 132/74   Site: Right Upper Arm   Position: Sitting   Cuff Size: Large Adult   Pulse: 79   Temp: 97.1 °F (36.2 °C)   TempSrc: Temporal   SpO2: 95%   Weight: 174 lb (78.9 kg)   Height: 5' 7\" (1.702 m)        Exam:  Physical Exam  Vitals signs reviewed.

## 2020-06-11 ENCOUNTER — HOSPITAL ENCOUNTER (OUTPATIENT)
Age: 65
Discharge: HOME | End: 2020-06-13
Payer: COMMERCIAL

## 2020-06-11 ENCOUNTER — OFFICE VISIT (OUTPATIENT)
Dept: PRIMARY CARE CLINIC | Age: 65
End: 2020-06-11
Payer: COMMERCIAL

## 2020-06-11 VITALS
OXYGEN SATURATION: 95 % | HEART RATE: 75 BPM | WEIGHT: 178 LBS | DIASTOLIC BLOOD PRESSURE: 66 MMHG | BODY MASS INDEX: 26.36 KG/M2 | HEIGHT: 69 IN | TEMPERATURE: 97.3 F | SYSTOLIC BLOOD PRESSURE: 110 MMHG

## 2020-06-11 PROBLEM — Z23 NEED FOR VACCINATION WITH PVAX (PNEUMOCOCCAL VACCINATION): Status: RESOLVED | Noted: 2019-12-09 | Resolved: 2020-06-11

## 2020-06-11 PROBLEM — D72.829 LEUKOCYTOSIS: Status: RESOLVED | Noted: 2019-12-09 | Resolved: 2020-06-11

## 2020-06-11 LAB
ALBUMIN SERPL-MCNC: 4.5 G/DL (ref 3.5–5.2)
ALP BLD-CCNC: 58 U/L (ref 40–129)
ALT SERPL-CCNC: 13 U/L (ref 0–40)
ANION GAP SERPL CALCULATED.3IONS-SCNC: 13 MMOL/L (ref 7–16)
AST SERPL-CCNC: 17 U/L (ref 0–39)
BILIRUB SERPL-MCNC: 0.4 MG/DL (ref 0–1.2)
BUN BLDV-MCNC: 16 MG/DL (ref 8–23)
CALCIUM SERPL-MCNC: 9.6 MG/DL (ref 8.6–10.2)
CHLORIDE BLD-SCNC: 104 MMOL/L (ref 98–107)
CHOLESTEROL, TOTAL: 193 MG/DL (ref 0–199)
CO2: 25 MMOL/L (ref 22–29)
CREAT SERPL-MCNC: 0.9 MG/DL (ref 0.7–1.2)
GFR AFRICAN AMERICAN: >60
GFR NON-AFRICAN AMERICAN: >60 ML/MIN/1.73
GLUCOSE BLD-MCNC: 95 MG/DL (ref 74–99)
HDLC SERPL-MCNC: 48 MG/DL
LDL CHOLESTEROL CALCULATED: 131 MG/DL (ref 0–99)
POTASSIUM SERPL-SCNC: 4.4 MMOL/L (ref 3.5–5)
SODIUM BLD-SCNC: 142 MMOL/L (ref 132–146)
TOTAL PROTEIN: 7.6 G/DL (ref 6.4–8.3)
TRIGL SERPL-MCNC: 72 MG/DL (ref 0–149)
VLDLC SERPL CALC-MCNC: 14 MG/DL

## 2020-06-11 PROCEDURE — 99214 OFFICE O/P EST MOD 30 MIN: CPT | Performed by: INTERNAL MEDICINE

## 2020-06-11 PROCEDURE — 36415 COLL VENOUS BLD VENIPUNCTURE: CPT

## 2020-06-11 PROCEDURE — 80053 COMPREHEN METABOLIC PANEL: CPT

## 2020-06-11 PROCEDURE — 80061 LIPID PANEL: CPT

## 2020-06-11 RX ORDER — DILTIAZEM HYDROCHLORIDE 120 MG/1
CAPSULE, EXTENDED RELEASE ORAL
COMMUNITY
Start: 2020-05-27 | End: 2020-11-25 | Stop reason: SDUPTHER

## 2020-06-11 ASSESSMENT — ENCOUNTER SYMPTOMS
TROUBLE SWALLOWING: 0
WHEEZING: 0
FACIAL SWELLING: 0
DIARRHEA: 0
ANAL BLEEDING: 0
EYE PAIN: 0
EYE ITCHING: 0
COLOR CHANGE: 0
COUGH: 0
EYE DISCHARGE: 0
SHORTNESS OF BREATH: 1
ABDOMINAL PAIN: 0
PHOTOPHOBIA: 0
STRIDOR: 0
NAUSEA: 0
CONSTIPATION: 0
VOMITING: 0
BLOOD IN STOOL: 0
RHINORRHEA: 0
SORE THROAT: 0

## 2020-06-11 ASSESSMENT — PATIENT HEALTH QUESTIONNAIRE - PHQ9
SUM OF ALL RESPONSES TO PHQ QUESTIONS 1-9: 0
2. FEELING DOWN, DEPRESSED OR HOPELESS: 0
1. LITTLE INTEREST OR PLEASURE IN DOING THINGS: 0
SUM OF ALL RESPONSES TO PHQ9 QUESTIONS 1 & 2: 0
SUM OF ALL RESPONSES TO PHQ QUESTIONS 1-9: 0

## 2020-06-11 NOTE — PROGRESS NOTES
 Alcohol intake above recommended sensible limits 2019    COPD (chronic obstructive pulmonary disease) (HCC)     Hematochezia due to medication 2019    Multifocal atrial tachycardia (HCC)     paroxysmal    Nicotine dependence     Right sided colitis 2019       Health Maintenance Due   Topic Date Due    Hepatitis C screen  1955    Shingles Vaccine (1 of 2) 2003        Patient Active Problem List   Diagnosis    Lung mass    Multifocal atrial tachycardia (HCC)    Mixed hyperlipidemia    Lumbar arthropathy    Lumbar radiculopathy    Nodule of upper lobe of left lung    COPD (chronic obstructive pulmonary disease) ([facility] [address]    Essential hypertension    Need for shingles vaccine    Hyperglycemia    Localized edema        Past Surgical History:   Procedure Laterality Date    TONSILLECTOMY          Family History   Problem Relation Age of Onset    No Known Problems Mother     Heart Attack Father         Social History     Tobacco Use    Smoking status: Former Smoker     Packs/day: 1.00     Years: 40.00     Pack years: 40.00     Last attempt to quit:      Years since quittin.4    Smokeless tobacco: Never Used   Substance Use Topics    Alcohol use: Not Currently     Frequency: Never    Drug use: Never        Objective  Vitals:    20 1004   BP: 110/66   Pulse: 75   Temp: 97.3 °F (36.3 °C)   TempSrc: Temporal   SpO2: 95%   Weight: 178 lb (80.7 kg)   Height: 5' 9\" (1.753 m)        Exam:  Physical Exam  Vitals signs reviewed. Constitutional:       General: He is not in acute distress. Appearance: Normal appearance. He is well-developed and normal weight. He is not ill-appearing. HENT:      Head: Normocephalic and atraumatic. Right Ear: External ear normal.      Left Ear: External ear normal.      Nose: Nose normal.      Mouth/Throat:      Pharynx: No oropharyngeal exudate. Eyes:      General: No scleral icterus. Right eye: No discharge. stable. Continue medications and monitor blood pressures at home. Call office if systolics are over 992 over diastolics over 90. Relevant Orders    Comprehensive Metabolic Panel       Respiratory    COPD (chronic obstructive pulmonary disease) ([facility])     Continues Advair Diskus, his DuoNeb's and his nebulizer machine. Albuterol HFA rescue inhaler. Follow-up with Dr. Vidhya Harper. He will get a CT chest without contrast with copy to Dr. Vidhya Harper to follow-up on the lesion in his left upper lobe of his lung         Relevant Medications    albuterol sulfate HFA (PROAIR HFA) 108 (90 Base) MCG/ACT inhaler    ipratropium-albuterol (DUONEB) 0.5-2.5 (3) MG/3ML SOLN nebulizer solution    fluticasone-salmeterol (ADVAIR DISKUS) 100-50 MCG/DOSE diskus inhaler       Other    Mixed hyperlipidemia     Watch saturated fats in diet and will monitor lipids         Relevant Medications    furosemide (LASIX) 20 MG tablet    DILT- MG extended release capsule    Other Relevant Orders    Comprehensive Metabolic Panel    Lipid Panel    Nodule of upper lobe of left lung     CT lung without contrast copy to Dr. Rosa Hdez and follow-up with him a week after         Relevant Orders    CT CHEST WO CONTRAST    Localized edema     Resolving slowly         Relevant Medications    furosemide (LASIX) 20 MG tablet           Return in about 3 months (around 9/11/2020).        Eve Castillo,   6/11/2020

## 2020-06-11 NOTE — ASSESSMENT & PLAN NOTE
Blood pressures are stable. Continue medications and monitor blood pressures at home. Call office if systolics are over 590 over diastolics over 90.

## 2020-06-19 ENCOUNTER — TELEPHONE (OUTPATIENT)
Dept: PRIMARY CARE CLINIC | Age: 65
End: 2020-06-19

## 2020-06-24 ENCOUNTER — TELEPHONE (OUTPATIENT)
Dept: PRIMARY CARE CLINIC | Age: 65
End: 2020-06-24

## 2020-06-29 ENCOUNTER — TELEPHONE (OUTPATIENT)
Dept: PRIMARY CARE CLINIC | Age: 65
End: 2020-06-29

## 2020-06-29 NOTE — TELEPHONE ENCOUNTER
Patient called in stating his CT scan of lungs which was scheduled 6/26 was denied. He was told to call Dr. Morales Ross to have her check in to why.

## 2020-08-13 RX ORDER — GABAPENTIN 300 MG/1
300 CAPSULE ORAL 3 TIMES DAILY
Qty: 270 CAPSULE | Refills: 1 | Status: SHIPPED
Start: 2020-08-13 | Stop reason: SDUPTHER

## 2020-08-13 NOTE — TELEPHONE ENCOUNTER
Last Appointment:  6/11/2020  Future Appointments   Date Time Provider Adela Petersen   9/10/2020 10:00 AM [address] [facility] [address]      Patient needs pended med refilled.     Electronically signed by Paulino Kang LPN on 4/15/9232 at 80:42 AM

## 2020-09-15 ENCOUNTER — HOSPITAL ENCOUNTER (OUTPATIENT)
Age: 65
Discharge: HOME | End: 2020-09-17
Payer: MEDICARE

## 2020-09-15 ENCOUNTER — OFFICE VISIT (OUTPATIENT)
Dept: PRIMARY CARE CLINIC | Age: 65
End: 2020-09-15
Payer: MEDICARE

## 2020-09-15 VITALS
SYSTOLIC BLOOD PRESSURE: 132 MMHG | OXYGEN SATURATION: 96 % | WEIGHT: 173.9 LBS | BODY MASS INDEX: 26.36 KG/M2 | HEIGHT: 68 IN | RESPIRATION RATE: 16 BRPM | TEMPERATURE: 97.6 F | HEART RATE: 74 BPM | DIASTOLIC BLOOD PRESSURE: 80 MMHG

## 2020-09-15 PROBLEM — J96.11 CHRONIC RESPIRATORY FAILURE WITH HYPOXIA (HCC): Status: ACTIVE | Noted: 2020-09-15

## 2020-09-15 PROCEDURE — 99214 OFFICE O/P EST MOD 30 MIN: CPT | Performed by: INTERNAL MEDICINE

## 2020-09-15 PROCEDURE — 80061 LIPID PANEL: CPT

## 2020-09-15 PROCEDURE — 36415 COLL VENOUS BLD VENIPUNCTURE: CPT

## 2020-09-15 ASSESSMENT — ENCOUNTER SYMPTOMS
STRIDOR: 0
ABDOMINAL PAIN: 0
SORE THROAT: 0
COLOR CHANGE: 0
WHEEZING: 0
DIARRHEA: 0
SHORTNESS OF BREATH: 1
EYE PAIN: 0
COUGH: 0
TROUBLE SWALLOWING: 0
EYE ITCHING: 0
PHOTOPHOBIA: 0
NAUSEA: 0
VOMITING: 0
CONSTIPATION: 0
EYE DISCHARGE: 0
BLOOD IN STOOL: 0
RHINORRHEA: 0
ANAL BLEEDING: 0
FACIAL SWELLING: 0

## 2020-09-15 NOTE — PROGRESS NOTES
9/15/2020    Name: Maggie Pabon : 1955 Sex: male  Age: 77 y.o. Subjective:  Chief Complaint   Patient presents with    3 Month Follow-Up        Hypertension   Associated symptoms include shortness of breath. Pertinent negatives include no chest pain, headaches or palpitations. He saw GI for evaluation of his hematochezia and abdominal pain. Symptoms resolved. Colonoscopy was done on 2020 and did not show any polyps. This showed diverticulosis and nonbleeding hemorrhoids. Patient has a history of COPD under the care of Dr. Ammy Hanna. He also has chronic hypoxic respiratory failure on oxygen . When previously seen at pulmonary office he failed his  6-minute walk test.  He will be following up with pulmonary in 2019. He will stay on his oxygen until then. He tells me that he takes it off occasionally when he goes and works outside, when he comes back his O2 saturation on room air is over 90%. We will let pulmonary decide whether to discontinue his oxygen. CT scan of his chest last year revealed spiculated lesion in the right lung which is unchanged. They thought it might be scarring from his previous pneumonia. He will have a recheck noncontrast CT scan of his chest  before he sees Dr. Ammy Hanna this . Last CT scan was in 2019 so he can have another one done in 2020     He has a history of multifocal atrial tachycardia while he was in the hospital.  He was placed on diltiazem and he has noticed that his ankles are more swollen at the end of the day. No worsening shortness of breath. He saw cardiology for follow-up in 2019. Reviewed Dr. Jessica Borrero office visit note    He complains of pain in his feet and calves when he walks. Relieved by rest.  He noticed that the soles of his feet are very sensitive but he says that it has been that way since he was a child.   Because of his previous history of nicotine use, will need to make sure he does not disturbance. Respiratory: Positive for shortness of breath. Negative for cough, wheezing and stridor. Going up and down steps   Cardiovascular: Positive for leg swelling. Negative for chest pain and palpitations. Gastrointestinal: Negative for abdominal pain, anal bleeding, blood in stool, constipation, diarrhea, nausea and vomiting. Endocrine: Negative for cold intolerance, heat intolerance, polydipsia, polyphagia and polyuria. Genitourinary: Negative for difficulty urinating, dysuria, flank pain, frequency, hematuria and urgency. Musculoskeletal: Positive for arthralgias. Negative for gait problem, joint swelling and myalgias. See HPI   Skin: Negative for color change, pallor and rash. Allergic/Immunologic: Negative for environmental allergies and food allergies. Neurological: Negative for dizziness, tremors, seizures, syncope, speech difficulty, weakness, light-headedness, numbness and headaches. Hematological: Negative for adenopathy. Does not bruise/bleed easily. Psychiatric/Behavioral: Negative for agitation, behavioral problems, confusion, sleep disturbance and suicidal ideas. The patient is not nervous/anxious. Current Outpatient Medications:     zoster recombinant adjuvanted vaccine (SHINGRIX) 50 MCG/0.5ML SUSR injection, Inject 0.5 mLs into the muscle once for 1 dose, Disp: 1 each, Rfl: 1    fluticasone-salmeterol (ADVAIR DISKUS) 100-50 MCG/DOSE diskus inhaler, Inhale 1 puff into the lungs 2 times daily, Disp: 1 Inhaler, Rfl: 5    gabapentin (NEURONTIN) 300 MG capsule, Take 1 capsule by mouth 3 times daily for 180 days.  Intended supply: 90 days, Disp: 270 capsule, Rfl: 1    DILT- MG extended release capsule, TAKE 1 CAPSULE BY MOUTH ONCE DAILY, Disp: , Rfl:     furosemide (LASIX) 20 MG tablet, Take 1 tablet by mouth daily as needed (swelling), Disp: 30 tablet, Rfl: 5    ipratropium-albuterol (DUONEB) 0.5-2.5 (3) MG/3ML SOLN nebulizer solution, Take 3 mLs by nebulization every 6 hours as needed for Shortness of Breath, Disp: 360 mL, Rfl: 3    albuterol sulfate HFA (PROAIR HFA) 108 (90 Base) MCG/ACT inhaler, Inhale into the lungs, Disp: , Rfl:      No Known Allergies     Past Medical History:   Diagnosis Date    Alcohol abuse     Alcohol intake above recommended sensible limits 2019    COPD (chronic obstructive pulmonary disease) ([facility] [address]     Hematochezia due to medication 2019    Multifocal atrial tachycardia (HCC)     paroxysmal    Nicotine dependence     Right sided colitis 2019       Health Maintenance Due   Topic Date Due    Hepatitis C screen  1955    Shingles Vaccine (1 of 2) 2003    Flu vaccine (1) 2020        Patient Active Problem List   Diagnosis    Lung mass    Multifocal atrial tachycardia (HCC)    Mixed hyperlipidemia    Lumbar arthropathy    Lumbar radiculopathy    Nodule of upper lobe of left lung    COPD (chronic obstructive pulmonary disease) ([facility] [address]    Essential hypertension    Need for shingles vaccine    Hyperglycemia    Localized edema    Chronic respiratory failure with hypoxia (HCC)        Past Surgical History:   Procedure Laterality Date    TONSILLECTOMY          Family History   Problem Relation Age of Onset    No Known Problems Mother     Heart Attack Father         Social History     Tobacco Use    Smoking status: Former Smoker     Packs/day: 1.00     Years: 40.00     Pack years: 40.00     Last attempt to quit: 2018     Years since quittin.7    Smokeless tobacco: Never Used   Substance Use Topics    Alcohol use: Not Currently     Frequency: Never    Drug use: Never        Objective  Vitals:    09/15/20 1018   BP: 132/80   Pulse: 74   Resp: 16   Temp: 97.6 °F (36.4 °C)   SpO2: 96%   Weight: 173 lb 14.4 oz (78.9 kg)   Height: 5' 8\" (1.727 m)        Exam:  Physical Exam  Vitals signs reviewed. Constitutional:       General: He is not in acute distress.      Appearance: Normal appearance. He is well-developed and normal weight. He is not ill-appearing. HENT:      Head: Normocephalic and atraumatic. Right Ear: External ear normal.      Left Ear: External ear normal.   Eyes:      General: No scleral icterus. Right eye: No discharge. Left eye: No discharge. Conjunctiva/sclera: Conjunctivae normal.      Pupils: Pupils are equal, round, and reactive to light. Neck:      Musculoskeletal: Normal range of motion and neck supple. No muscular tenderness. Thyroid: No thyromegaly. Vascular: No carotid bruit. Cardiovascular:      Rate and Rhythm: Normal rate and regular rhythm. Heart sounds: Normal heart sounds. No murmur. No friction rub. No gallop. Comments: Peripheral pulses are lightly decreased, DP and PT  Pulmonary:      Effort: Pulmonary effort is normal. No respiratory distress. Breath sounds: Decreased breath sounds present. No wheezing or rales. Chest:      Chest wall: No tenderness. Abdominal:      General: Bowel sounds are normal. There is no distension. Palpations: Abdomen is soft. There is no mass. Tenderness: There is no abdominal tenderness. There is no guarding or rebound. Musculoskeletal: Normal range of motion. General: Swelling present. No tenderness or deformity. Comments: Right ankle has 1+ edema,    Lymphadenopathy:      Cervical: No cervical adenopathy. Skin:     General: Skin is warm and dry. Coloration: Skin is not pale. Findings: No erythema or rash. Neurological:      General: No focal deficit present. Mental Status: He is alert and oriented to person, place, and time. Cranial Nerves: No cranial nerve deficit. Sensory: No sensory deficit. Deep Tendon Reflexes: Reflexes normal.   Psychiatric:         Mood and Affect: Mood normal.         Behavior: Behavior normal.         Thought Content:  Thought content normal.         Judgment: Judgment normal. Last labs reviewed. ASSESSMENT & PLAN :   Problem List        Circulatory    Multifocal atrial tachycardia (HCC)     Continue diltiazem. He has no further episodes         Essential hypertension - Primary     Blood pressures are stable. Continue medications and monitor blood pressures at home. Call office if systolics are over 191 over diastolics over 90. Respiratory    COPD (chronic obstructive pulmonary disease) (HCC)     Continue duo nebs as directed by         Relevant Medications    albuterol sulfate HFA (PROAIR HFA) 108 (90 Base) MCG/ACT inhaler    ipratropium-albuterol (DUONEB) 0.5-2.5 (3) MG/3ML SOLN nebulizer solution    fluticasone-salmeterol (ADVAIR DISKUS) 100-50 MCG/DOSE diskus inhaler    Chronic respiratory failure with hypoxia (HCC)     Continue oxygen 24/7            Other    Mixed hyperlipidemia     Patient unwilling to go on a statin. We will recheck his lipids and again discussed with him the necessity of him starting a statin         Relevant Medications    furosemide (LASIX) 20 MG tablet    DILT- MG extended release capsule    Other Relevant Orders    Lipid Panel    Nodule of upper lobe of left lung     Noncontrast CT scan of lung in early November and follow-up with his pulmonary doctor after that         Relevant Orders    CT CHEST WO CONTRAST    Need for shingles vaccine     Shingrix vaccination sent to pharmacy         Relevant Medications    zoster recombinant adjuvanted vaccine [facility]) 50 MCG/0.5ML SUSR injection    Hyperglycemia     Blood sugars have been acceptable. Return in about 3 months (around 12/15/2020), or AWV and office visit.        Chris Bautista,   9/15/2020

## 2020-09-15 NOTE — ASSESSMENT & PLAN NOTE
Patient unwilling to go on a statin.   We will recheck his lipids and again discussed with him the necessity of him starting a statin

## 2020-09-15 NOTE — ASSESSMENT & PLAN NOTE
Blood pressures are stable. Continue medications and monitor blood pressures at home. Call office if systolics are over 725 over diastolics over 90.

## 2020-09-17 ENCOUNTER — TELEPHONE (OUTPATIENT)
Dept: PRIMARY CARE CLINIC | Age: 65
End: 2020-09-17

## 2020-09-17 LAB
CHOLESTEROL, TOTAL: 205 MG/DL (ref 0–199)
HDLC SERPL-MCNC: 49 MG/DL
LDL CHOLESTEROL CALCULATED: 136 MG/DL (ref 0–99)
TRIGL SERPL-MCNC: 98 MG/DL (ref 0–149)
VLDLC SERPL CALC-MCNC: 20 MG/DL

## 2020-09-17 NOTE — TELEPHONE ENCOUNTER
I believe the only pulmonologist left in [place] would be Dr. Herlinda Young.   We could make a referral to him or we could refer to a  [facility] pulmonologist in [facility]

## 2020-09-17 NOTE — TELEPHONE ENCOUNTER
Pt Is seeing dr Pj Taylor, but his office is closing. He would like to be referral to another pulmonologist, and would like to stay close to [place].  Please advise

## 2020-10-15 ENCOUNTER — NURSE ONLY (OUTPATIENT)
Dept: PRIMARY CARE CLINIC | Age: 65
End: 2020-10-15
Payer: MEDICARE

## 2020-10-15 PROCEDURE — 90694 VACC AIIV4 NO PRSRV 0.5ML IM: CPT | Performed by: INTERNAL MEDICINE

## 2020-10-15 PROCEDURE — G0008 ADMIN INFLUENZA VIRUS VAC: HCPCS | Performed by: INTERNAL MEDICINE

## 2020-10-15 NOTE — PROGRESS NOTES
Vaccine Information Sheet, \"Influenza - Inactivated\"  given to Tacy Roots, or parent/legal guardian of  Tacy Huong and verbalized understanding. Patient responses:    Have you ever had a reaction to a flu vaccine? No  Do you have any current illness? No  Have you ever had [place] Syndrome? No  Do you have a serious allergy to any of the follow: Neomycin, Polymyxin, Thimerosal, eggs or egg products? No    Flu vaccine given per order. Please see immunization tab. Risks and benefits explained. Current VIS given.

## 2020-11-05 ENCOUNTER — TELEPHONE (OUTPATIENT)
Dept: FAMILY MEDICINE CLINIC | Age: 65
End: 2020-11-05

## 2020-11-05 NOTE — TELEPHONE ENCOUNTER
Marlen Ortega calling please place a referral to dr Edmar Mcmahan, pulmonology.  Dr Khanh Marsh has retired

## 2020-11-25 RX ORDER — DILTIAZEM HYDROCHLORIDE 120 MG/1
120 CAPSULE, EXTENDED RELEASE ORAL DAILY
Qty: 90 CAPSULE | Refills: 3 | Status: SHIPPED
Start: 2020-11-25 | End: 2021-09-27 | Stop reason: SDUPTHER

## 2020-11-25 NOTE — TELEPHONE ENCOUNTER
Last Appointment:  9/15/2020  Future Appointments   Date Time Provider [place] [place]   12/17/2020 10:00 AM Siddharth Meade [facility]   12/17/2020 10:30 AM Siddharth Meade [facility]   12/22/2020 11:00 AM Hugh Ratliff MD [facility]      Refill pending

## 2020-12-17 ENCOUNTER — OFFICE VISIT (OUTPATIENT)
Dept: PRIMARY CARE CLINIC | Age: 65
End: 2020-12-17
Payer: MEDICARE

## 2020-12-17 VITALS
DIASTOLIC BLOOD PRESSURE: 74 MMHG | HEIGHT: 70 IN | OXYGEN SATURATION: 97 % | TEMPERATURE: 98.2 F | HEART RATE: 89 BPM | SYSTOLIC BLOOD PRESSURE: 138 MMHG | BODY MASS INDEX: 25.05 KG/M2 | WEIGHT: 175 LBS

## 2020-12-17 VITALS
HEART RATE: 89 BPM | BODY MASS INDEX: 26.52 KG/M2 | DIASTOLIC BLOOD PRESSURE: 74 MMHG | OXYGEN SATURATION: 98 % | TEMPERATURE: 98.2 F | WEIGHT: 175 LBS | HEIGHT: 68 IN | SYSTOLIC BLOOD PRESSURE: 138 MMHG

## 2020-12-17 DIAGNOSIS — E78.2 MIXED HYPERLIPIDEMIA: ICD-10-CM

## 2020-12-17 DIAGNOSIS — I10 ESSENTIAL HYPERTENSION: ICD-10-CM

## 2020-12-17 LAB
ALBUMIN SERPL-MCNC: 4.4 G/DL (ref 3.5–5.2)
ALP BLD-CCNC: 53 U/L (ref 40–129)
ALT SERPL-CCNC: 11 U/L (ref 0–40)
ANION GAP SERPL CALCULATED.3IONS-SCNC: 5 MMOL/L (ref 7–16)
AST SERPL-CCNC: 15 U/L (ref 0–39)
BILIRUB SERPL-MCNC: 0.5 MG/DL (ref 0–1.2)
BUN BLDV-MCNC: 11 MG/DL (ref 8–23)
CALCIUM SERPL-MCNC: 9.3 MG/DL (ref 8.6–10.2)
CHLORIDE BLD-SCNC: 101 MMOL/L (ref 98–107)
CHOLESTEROL, TOTAL: 185 MG/DL (ref 0–199)
CO2: 29 MMOL/L (ref 22–29)
CREAT SERPL-MCNC: 0.9 MG/DL (ref 0.7–1.2)
GFR AFRICAN AMERICAN: >60
GFR NON-AFRICAN AMERICAN: >60 ML/MIN/1.73
GLUCOSE BLD-MCNC: 103 MG/DL (ref 74–99)
HDLC SERPL-MCNC: 46 MG/DL
LDL CHOLESTEROL CALCULATED: 120 MG/DL (ref 0–99)
POTASSIUM SERPL-SCNC: 4.6 MMOL/L (ref 3.5–5)
SODIUM BLD-SCNC: 135 MMOL/L (ref 132–146)
TOTAL PROTEIN: 7.4 G/DL (ref 6.4–8.3)
TRIGL SERPL-MCNC: 94 MG/DL (ref 0–149)
VLDLC SERPL CALC-MCNC: 19 MG/DL

## 2020-12-17 PROCEDURE — 99213 OFFICE O/P EST LOW 20 MIN: CPT | Performed by: INTERNAL MEDICINE

## 2020-12-17 PROCEDURE — G0439 PPPS, SUBSEQ VISIT: HCPCS | Performed by: INTERNAL MEDICINE

## 2020-12-17 ASSESSMENT — ENCOUNTER SYMPTOMS
EYE ITCHING: 0
NAUSEA: 0
ABDOMINAL PAIN: 0
FACIAL SWELLING: 0
BLOOD IN STOOL: 0
DIARRHEA: 0
SORE THROAT: 0
COUGH: 0
WHEEZING: 0
STRIDOR: 0
ANAL BLEEDING: 0
PHOTOPHOBIA: 0
VOMITING: 0
COLOR CHANGE: 0
TROUBLE SWALLOWING: 0
EYE DISCHARGE: 0
RHINORRHEA: 0
EYE PAIN: 0
CONSTIPATION: 0
SHORTNESS OF BREATH: 1

## 2020-12-17 ASSESSMENT — LIFESTYLE VARIABLES: HOW OFTEN DO YOU HAVE A DRINK CONTAINING ALCOHOL: 0

## 2020-12-17 ASSESSMENT — PATIENT HEALTH QUESTIONNAIRE - PHQ9
SUM OF ALL RESPONSES TO PHQ QUESTIONS 1-9: 0
1. LITTLE INTEREST OR PLEASURE IN DOING THINGS: 0
SUM OF ALL RESPONSES TO PHQ QUESTIONS 1-9: 0
SUM OF ALL RESPONSES TO PHQ9 QUESTIONS 1 & 2: 0
SUM OF ALL RESPONSES TO PHQ QUESTIONS 1-9: 0
2. FEELING DOWN, DEPRESSED OR HOPELESS: 0

## 2020-12-17 NOTE — PROGRESS NOTES
2020    Name: Jennifer Dill : 1955 Sex: male  Age: 77 y.o. Subjective:  Chief Complaint   Patient presents with    Hypertension        Hypertension  Associated symptoms include shortness of breath. Pertinent negatives include no chest pain, headaches or palpitations. He saw GI for evaluation of his hematochezia and abdominal pain. Symptoms resolved. Colonoscopy was done on 2020 and did not show any polyps. This showed diverticulosis and nonbleeding hemorrhoids. He no longer has any rectal bleeding. Patient has a history of COPD under the care of Dr. Esteban Lowe. He also has chronic hypoxic respiratory failure on oxygen . When previously seen at pulmonary office he failed his  6-minute walk test.  He will be following up with pulmonary in 2019. He will stay on his oxygen until then. He tells me that he takes it off occasionally when he goes and works outside, when he comes back his O2 saturation on room air is over 90%. We will let pulmonary decide whether to discontinue his oxygen. CT scan of his chest last year revealed spiculated lesion in the right lung which is unchanged. They thought it might be scarring from his previous pneumonia. He will have a recheck noncontrast CT scan of his chest    Last CT scan was in 2019 so he can have another one done in 2020 he promises to get this done in the future. He will be seeing a new pulmonologist this is old on his left town. He has a history of multifocal atrial tachycardia while he was in the hospital.  He was placed on diltiazem and he has noticed that his ankles are more swollen at the end of the day. No worsening shortness of breath. He saw cardiology for follow-up in 2019. Reviewed Dr. Jeramy Cuevas office visit note    He complains of pain in his feet and calves when he walks.   Relieved by rest.  He noticed that the soles of his feet are very sensitive but he says that it has been that way since he was a child. Because of his previous history of nicotine use, will need to make sure he does not have peripheral vascular disease. He had an ultrasound of his abdominal aorta to rule out aortic aneurysm because he was a heavy smoker. No sign of any aneurysm. DL was done which showed some possibility of decreased blood flow to both lower extremities. CTA of his abdominal aorta with runoff was done which showed no evidence of aortic aneurysm and good blood flow to his legs. I think he might have some element of lumbar radiculopathy. He complains of numbness and tingling and pain of his legs. We started him on gabapentin his last visit and he is going to increase it to 300 mg a day then he will start taking 300 mg twice a day and then after a while take 300 milligrams 3 times a day. He was scheduled for nerve conduction test with a never got them done. [place] He still has problems with occasional pain in his right lower extremity depending upon what he is doing. Occasionally his right leg would swell. He says is getting a little better with physical therapy     Reviewed blood work in which his white blood cell count was elevated. This was after he was treated for pneumonia. He also has slightly elevated fasting blood sugar. Repeat CBC showed normal WBC count. His LDL was elevated at 142 in August 2019. His cardiovascular risk factor is also elevated. Discussed this with him. The recommendation was that he start on a statin. He wants to think about this. He is not sure if he wants to go on another medication. His cardiologist also told him to discontinue his enteric-coated aspirin. We will recheck his lipids today and if they are still elevated I will talk to him again about primary prevention using statin. Review of Systems   Constitutional: Negative for appetite change, fatigue and unexpected weight change.    HENT: Negative for congestion, ear pain, facial swelling, rhinorrhea, sore throat, tinnitus and trouble swallowing. Eyes: Negative for photophobia, pain, discharge, itching and visual disturbance. Respiratory: Positive for shortness of breath. Negative for cough, wheezing and stridor. Going up and down steps   Cardiovascular: Positive for leg swelling. Negative for chest pain and palpitations. Gastrointestinal: Negative for abdominal pain, anal bleeding, blood in stool, constipation, diarrhea, nausea and vomiting. Endocrine: Negative for cold intolerance, heat intolerance, polydipsia, polyphagia and polyuria. Genitourinary: Negative for difficulty urinating, dysuria, flank pain, frequency, hematuria and urgency. Musculoskeletal: Positive for arthralgias. Negative for gait problem, joint swelling and myalgias. See HPI   Skin: Negative for color change, pallor and rash. Allergic/Immunologic: Negative for environmental allergies and food allergies. Neurological: Negative for dizziness, tremors, seizures, syncope, speech difficulty, weakness, light-headedness, numbness and headaches. Hematological: Negative for adenopathy. Does not bruise/bleed easily. Psychiatric/Behavioral: Negative for agitation, behavioral problems, confusion, sleep disturbance and suicidal ideas. The patient is not nervous/anxious. Current Outpatient Medications:     DILT- MG extended release capsule, Take 1 capsule by mouth daily, Disp: 90 capsule, Rfl: 3    gabapentin (NEURONTIN) 300 MG capsule, Take 1 capsule by mouth 3 times daily for 180 days.  Intended supply: 90 days, Disp: 270 capsule, Rfl: 1    ipratropium-albuterol (DUONEB) 0.5-2.5 (3) MG/3ML SOLN nebulizer solution, Take 3 mLs by nebulization every 6 hours as needed for Shortness of Breath, Disp: 360 mL, Rfl: 3    albuterol sulfate HFA (PROAIR HFA) 108 (90 Base) MCG/ACT inhaler, Inhale into the lungs, Disp: , Rfl:     zoster recombinant adjuvanted vaccine (SHINGRIX) 50 MCG/0.5ML SUSR injection, Inject 0.5 mLs into the muscle once for 1 dose, Disp: 0.5 mL, Rfl: 0    fluticasone-salmeterol (ADVAIR DISKUS) 100-50 MCG/DOSE diskus inhaler, Inhale 1 puff into the lungs 2 times daily, Disp: 1 Inhaler, Rfl: 5    furosemide (LASIX) 20 MG tablet, Take 1 tablet by mouth daily as needed (swelling), Disp: 30 tablet, Rfl: 5     No Known Allergies     Past Medical History:   Diagnosis Date    Alcohol abuse     Alcohol intake above recommended sensible limits 2019    [facility]) [facility] [address]     Hematochezia due to medication 2019    Multifocal atrial tachycardia (HCC)     paroxysmal    Nicotine dependence     Right sided colitis 2019       Health Maintenance Due   Topic Date Due    Hepatitis C screen  1955    Shingles Vaccine (1 of 2) 2003    Annual Wellness Visit (AWV)  09/15/2020    Low dose CT lung screening  2020        Patient Active Problem List   Diagnosis    Lung mass    Multifocal atrial tachycardia (HCC)    Mixed hyperlipidemia    Lumbar arthropathy    Lumbar radiculopathy    Nodule of upper lobe of left lung    COPD (chronic obstructive pulmonary disease) ([facility] [address]    Essential hypertension    Need for shingles vaccine    Hyperglycemia    Localized edema    Chronic respiratory failure with hypoxia (HCC)        Past Surgical History:   Procedure Laterality Date    TONSILLECTOMY          Family History   Problem Relation Age of Onset    No Known Problems Mother     Heart Attack Father         Social History     Tobacco Use    Smoking status: Former Smoker     Packs/day: 1.00     Years: 40.00     Pack years: 40.00     Quit date:      Years since quittin.9    Smokeless tobacco: Never Used   Substance Use Topics    Alcohol use: Not Currently     Frequency: Never    Drug use: Never        Objective  Vitals:    20 0956   BP: 138/74   Site: Right Upper Arm   Position: Sitting   Cuff Size: Medium Adult   Pulse: 89   Temp: 98.2 °F (36.8 °C)   TempSrc: Temporal   SpO2: 98%   Weight: 175 lb (79.4 kg)   Height: 5' 8\" (1.727 m)        Exam:  Physical Exam  Vitals signs reviewed. Constitutional:       General: He is not in acute distress. Appearance: Normal appearance. He is well-developed and normal weight. He is not ill-appearing. HENT:      Head: Normocephalic and atraumatic. Right Ear: External ear normal.      Left Ear: External ear normal.   Eyes:      General: No scleral icterus. Right eye: No discharge. Left eye: No discharge. Conjunctiva/sclera: Conjunctivae normal.      Pupils: Pupils are equal, round, and reactive to light. Neck:      Musculoskeletal: Normal range of motion and neck supple. No muscular tenderness. Thyroid: No thyromegaly. Vascular: No carotid bruit. Cardiovascular:      Rate and Rhythm: Normal rate and regular rhythm. Heart sounds: Normal heart sounds. No murmur. No friction rub. No gallop. Comments: Peripheral pulses are lightly decreased, DP and PT  Pulmonary:      Effort: Pulmonary effort is normal. No respiratory distress. Breath sounds: Decreased breath sounds present. No wheezing or rales. Chest:      Chest wall: No tenderness. Abdominal:      General: Bowel sounds are normal. There is no distension. Palpations: Abdomen is soft. There is no mass. Tenderness: There is no abdominal tenderness. There is no guarding or rebound. Musculoskeletal: Normal range of motion. General: Swelling present. No tenderness or deformity. Comments: Right ankle has 1+ edema,    Lymphadenopathy:      Cervical: No cervical adenopathy. Skin:     General: Skin is warm and dry. Coloration: Skin is not pale. Findings: No erythema or rash. Neurological:      General: No focal deficit present. Mental Status: He is alert and oriented to person, place, and time. Cranial Nerves: No cranial nerve deficit.       Sensory: No sensory deficit. Deep Tendon Reflexes: Reflexes normal.   Psychiatric:         Mood and Affect: Mood normal.         Behavior: Behavior normal.         Thought Content: Thought content normal.         Judgment: Judgment normal.          Last labs reviewed. ASSESSMENT & PLAN :   Problem List        Circulatory    Multifocal atrial tachycardia (HCC)     No recent episodes. Will follow up with cardiology         Essential hypertension - Primary     Blood pressures are stable. Continue medications and monitor blood pressures at home. Call office if systolics are over 145 over diastolics over 90. Check CMP         Relevant Orders    Comprehensive Metabolic Panel       Respiratory    COPD (chronic obstructive pulmonary disease) (HCC)     Continue inhalers and refer to new pulmonologist         Relevant Medications    albuterol sulfate HFA (PROAIR HFA) 108 (90 Base) MCG/ACT inhaler    ipratropium-albuterol (DUONEB) 0.5-2.5 (3) MG/3ML SOLN nebulizer solution    Chronic respiratory failure with hypoxia (HCC)     Continue oxygen 24/7            Other    Mixed hyperlipidemia     Watch saturated fats in diet and will monitor lipids         Relevant Medications    DILT- MG extended release capsule    Other Relevant Orders    Lipid Panel    Need for shingles vaccine     Shingrix requisition given                Return in about 3 months (around 3/17/2021), or hypertension.        Rodolfo Vazquez,   12/17/2020

## 2020-12-17 NOTE — ASSESSMENT & PLAN NOTE
Blood pressures are stable. Continue medications and monitor blood pressures at home. Call office if systolics are over 853 over diastolics over 90.   Check CMP

## 2020-12-17 NOTE — PROGRESS NOTES
Family History   Problem Relation Age of Onset    No Known Problems Mother     Heart Attack Father        CareTeam (Including outside providers/suppliers regularly involved in providing care):   Patient Care Team:  Adrianne Nix DO as PCP - General (Internal Medicine)  Adrianne Nix DO as PCP - [facility] Empaneled Provider    Wt Readings from Last 3 Encounters:   12/17/20 175 lb (79.4 kg)   12/17/20 175 lb (79.4 kg)   09/15/20 173 lb 14.4 oz (78.9 kg)     Vitals:    12/17/20 0958   BP: 138/74   Site: Right Upper Arm   Position: Sitting   Cuff Size: Medium Adult   Pulse: 89   Temp: 98.2 °F (36.8 °C)   TempSrc: Temporal   SpO2: 97%   Weight: 175 lb (79.4 kg)   Height: 5' 10\" (1.778 m)     Body mass index is 25.11 kg/m². Based upon direct observation of the patient, evaluation of cognition reveals recent and remote memory intact. Patient's complete Health Risk Assessment and screening values have been reviewed and are found in Flowsheets. The following problems were reviewed today and where indicated follow up appointments were made and/or referrals ordered. Positive Risk Factor Screenings with Interventions:            General Health and ACP:  General  In general, how would you say your health is?: Very Good  In the past 7 days, have you experienced any of the following?  New or Increased Pain, New or Increased Fatigue, Loneliness, Social Isolation, Stress or Anger?: None of These  Do you get the social and emotional support that you need?: Yes  Do you have a Living Will?: Yes  Advance Directives     Power of [address] [place] [address] Will ACP-Advance Directive ACP-Power of     Not on File Not on File Not on File Not on File      General Health Risk Interventions:  · will bring copy of living will and [address] [facility] Habits/Nutrition:  Health Habits/Nutrition  Do you exercise for at least 20 minutes 2-3 times per week?: (!) No  Have you lost any weight without trying in the past 3 months?: No Do you eat fewer than 2 meals per day?: No  Have you seen a dentist within the past year?: Yes  Body mass index: (!) 25.11  Health Habits/Nutrition Interventions:  · Inadequate physical activity:  patient is not ready to increase his/her physical activity level at this time     Safety:  Safety  Do you have working smoke detectors?: Yes  Have all throw rugs been removed or fastened?: Yes  Do you have non-slip mats or surfaces in all bathtubs/showers?: Yes  Do all of your stairways have a railing or banister?: Yes  Are your doorways, halls and stairs free of clutter?: Yes  Do you always fasten your seatbelt when you are in a car?: (!) No  Safety Interventions:  · Home safety tips provided     Personalized Preventive Plan   Current Health Maintenance Status  Immunization History   Administered Date(s) Administered    Influenza, High Dose (Fluzone 65 yrs and older) 11/28/2018    Influenza, Quadv, adjuvanted, 65 yrs +, IM, PF (Fluad) 10/15/2020    Influenza, Triv, inactivated, subunit, adjuvanted, IM (Fluad 65 yrs and older) 10/14/2019    Pneumococcal Conjugate 13-valent ([id number]) 11/29/2018    Pneumococcal Polysaccharide ([id number]) 12/09/2019    Tdap (Boostrix, Adacel) 03/14/2020        Health Maintenance   Topic Date Due    Hepatitis C screen  1955    Shingles Vaccine (1 of 2) 04/22/2003    Annual Wellness Visit (AWV)  09/15/2020    Low dose CT lung screening  11/06/2020    Potassium monitoring  06/11/2021    Creatinine monitoring  06/11/2021    Lipid screen  09/15/2025    Colon cancer screen colonoscopy  02/12/2030    DTaP/Tdap/Td vaccine (2 - Td) 03/14/2030    Flu vaccine  Completed    Pneumococcal 65+ years Vaccine  Completed    AAA screen  Completed    Hepatitis A vaccine  Aged Out    Hepatitis B vaccine  Aged Out    Hib vaccine  Aged Out    Meningococcal (ACWY) vaccine  Aged Out     Recommendations for [employer] Due: see orders and patient instructions/AVS.  . Recommended screening schedule for the next 5-10 years is provided to the patient in written form: see Patient Instructions/AVS.    Deepika España was seen today for medicare awv. Diagnoses and all orders for this visit:    Routine general medical examination at a health care facility    Need for prophylactic vaccination and inoculation against varicella  -     zoster recombinant adjuvanted vaccine [facility]) 50 MCG/0.5ML SUSR injection; Inject 0.5 mLs into the muscle once for 1 dose                 Cardiovascular Disease Risk Counseling: Assessed the patient's risk to develop cardiovascular disease and reviewed main risk factors. Reviewed steps to reduce disease risk including:   · Quitting tobacco use, reducing amount smoked, or not starting the habit  · Making healthy food choices  · Being physically active and gradualy increasing activity levels   · Reduce weight and determine a healthy BMI goal  · Monitor blood pressure and treat if higher than 140/90 mmHg  · Maintain blood total cholesterol levels under 5 mmol/l or 190 mg/dl  · Maintain LDL cholesterol levels under 3.0 mmol/l or 115 mg/dl   · Control blood glucose levels  · Consider taking aspirin (75 mg daily), once blood pressure is controlled   Provided a follow up plan.   Time spent (minutes): 8

## 2020-12-17 NOTE — PATIENT INSTRUCTIONS
Personalized Preventive Plan for Allyson Borja - 12/17/2020  Medicare offers a range of preventive health benefits. Some of the tests and screenings are paid in full while other may be subject to a deductible, co-insurance, and/or copay. Some of these benefits include a comprehensive review of your medical history including lifestyle, illnesses that may run in your family, and various assessments and screenings as appropriate. After reviewing your medical record and screening and assessments performed today your provider may have ordered immunizations, labs, imaging, and/or referrals for you. A list of these orders (if applicable) as well as your Preventive Care list are included within your After Visit Summary for your review. Other Preventive Recommendations:    · A preventive eye exam performed by an eye specialist is recommended every 1-2 years to screen for glaucoma; cataracts, macular degeneration, and other eye disorders. · A preventive dental visit is recommended every 6 months. · Try to get at least 150 minutes of exercise per week or 10,000 steps per day on a pedometer . · Order or download the FREE \"Exercise & Physical Activity: Your Everyday Guide\" from [employer]. Call 5-643.709.1034 or search [employer] online. · You need 3614-3795 mg of calcium and 6774-2436 IU of vitamin D per day. It is possible to meet your calcium requirement with diet alone, but a vitamin D supplement is usually necessary to meet this goal.  · When exposed to the sun, use a sunscreen that protects against both UVA and UVB radiation with an SPF of 30 or greater. Reapply every 2 to 3 hours or after sweating, drying off with a towel, or swimming. · Always wear a seat belt when traveling in a car. Always wear a helmet when riding a bicycle or motorcycle.

## 2021-03-05 DIAGNOSIS — M54.16 LUMBAR RADICULOPATHY: ICD-10-CM

## 2021-03-06 RX ORDER — GABAPENTIN 300 MG/1
300 CAPSULE ORAL 3 TIMES DAILY
Qty: 270 CAPSULE | Refills: 1 | Status: SHIPPED
Start: 2021-03-06 | End: 2021-03-08 | Stop reason: SDUPTHER

## 2021-03-08 DIAGNOSIS — M54.16 LUMBAR RADICULOPATHY: ICD-10-CM

## 2021-03-08 RX ORDER — GABAPENTIN 300 MG/1
300 CAPSULE ORAL 3 TIMES DAILY
Qty: 270 CAPSULE | Refills: 1 | Status: SHIPPED
Start: 2021-03-08 | End: 2021-09-27 | Stop reason: SDUPTHER

## 2021-03-18 ENCOUNTER — OFFICE VISIT (OUTPATIENT)
Dept: PRIMARY CARE CLINIC | Age: 66
End: 2021-03-18
Payer: MEDICARE

## 2021-03-18 ENCOUNTER — TELEPHONE (OUTPATIENT)
Dept: PRIMARY CARE CLINIC | Age: 66
End: 2021-03-18

## 2021-03-18 VITALS
TEMPERATURE: 97 F | HEIGHT: 68 IN | RESPIRATION RATE: 18 BRPM | OXYGEN SATURATION: 93 % | DIASTOLIC BLOOD PRESSURE: 80 MMHG | SYSTOLIC BLOOD PRESSURE: 142 MMHG | BODY MASS INDEX: 27.58 KG/M2 | HEART RATE: 85 BPM | WEIGHT: 182 LBS

## 2021-03-18 DIAGNOSIS — R91.8 LUNG MASS: ICD-10-CM

## 2021-03-18 DIAGNOSIS — R91.1 NODULE OF UPPER LOBE OF LEFT LUNG: ICD-10-CM

## 2021-03-18 DIAGNOSIS — I47.19 MULTIFOCAL ATRIAL TACHYCARDIA: ICD-10-CM

## 2021-03-18 DIAGNOSIS — R73.9 HYPERGLYCEMIA: ICD-10-CM

## 2021-03-18 DIAGNOSIS — I10 ESSENTIAL HYPERTENSION: ICD-10-CM

## 2021-03-18 DIAGNOSIS — I10 ESSENTIAL HYPERTENSION: Primary | ICD-10-CM

## 2021-03-18 DIAGNOSIS — E78.2 MIXED HYPERLIPIDEMIA: ICD-10-CM

## 2021-03-18 DIAGNOSIS — J96.11 CHRONIC RESPIRATORY FAILURE WITH HYPOXIA (HCC): ICD-10-CM

## 2021-03-18 DIAGNOSIS — J43.9 PULMONARY EMPHYSEMA, UNSPECIFIED EMPHYSEMA TYPE (HCC): ICD-10-CM

## 2021-03-18 DIAGNOSIS — I47.1 MULTIFOCAL ATRIAL TACHYCARDIA (HCC): ICD-10-CM

## 2021-03-18 LAB
ALBUMIN SERPL-MCNC: 4.7 G/DL (ref 3.5–5.2)
ALP BLD-CCNC: 53 U/L (ref 40–129)
ALT SERPL-CCNC: 11 U/L (ref 0–40)
ANION GAP SERPL CALCULATED.3IONS-SCNC: 13 MMOL/L (ref 7–16)
AST SERPL-CCNC: 17 U/L (ref 0–39)
BILIRUB SERPL-MCNC: 0.4 MG/DL (ref 0–1.2)
BUN BLDV-MCNC: 15 MG/DL (ref 8–23)
CALCIUM SERPL-MCNC: 9.3 MG/DL (ref 8.6–10.2)
CHLORIDE BLD-SCNC: 103 MMOL/L (ref 98–107)
CHOLESTEROL, TOTAL: 170 MG/DL (ref 0–199)
CO2: 25 MMOL/L (ref 22–29)
CREAT SERPL-MCNC: 0.8 MG/DL (ref 0.7–1.2)
GFR AFRICAN AMERICAN: >60
GFR NON-AFRICAN AMERICAN: >60 ML/MIN/1.73
GLUCOSE BLD-MCNC: 107 MG/DL (ref 74–99)
HBA1C MFR BLD: 5.5 % (ref 4–5.6)
HDLC SERPL-MCNC: 36 MG/DL
LDL CHOLESTEROL CALCULATED: 117 MG/DL (ref 0–99)
POTASSIUM SERPL-SCNC: 4.4 MMOL/L (ref 3.5–5)
SODIUM BLD-SCNC: 141 MMOL/L (ref 132–146)
TOTAL PROTEIN: 7.3 G/DL (ref 6.4–8.3)
TRIGL SERPL-MCNC: 86 MG/DL (ref 0–149)
VLDLC SERPL CALC-MCNC: 17 MG/DL

## 2021-03-18 PROCEDURE — 99214 OFFICE O/P EST MOD 30 MIN: CPT | Performed by: INTERNAL MEDICINE

## 2021-03-18 ASSESSMENT — ENCOUNTER SYMPTOMS
TROUBLE SWALLOWING: 0
RHINORRHEA: 0
COUGH: 0
BLOOD IN STOOL: 0
FACIAL SWELLING: 0
STRIDOR: 0
VOMITING: 0
EYE ITCHING: 0
SHORTNESS OF BREATH: 1
PHOTOPHOBIA: 0
EYE PAIN: 0
SORE THROAT: 0
NAUSEA: 0
ABDOMINAL PAIN: 0
WHEEZING: 0
EYE DISCHARGE: 0
CONSTIPATION: 0
COLOR CHANGE: 0
DIARRHEA: 0
ANAL BLEEDING: 0

## 2021-03-18 ASSESSMENT — PATIENT HEALTH QUESTIONNAIRE - PHQ9
SUM OF ALL RESPONSES TO PHQ QUESTIONS 1-9: 2
SUM OF ALL RESPONSES TO PHQ QUESTIONS 1-9: 2
1. LITTLE INTEREST OR PLEASURE IN DOING THINGS: 1
2. FEELING DOWN, DEPRESSED OR HOPELESS: 1

## 2021-03-18 NOTE — PROGRESS NOTES
3/18/2021    Name: John Angel : 1955 Sex: male  Age: 77 y.o. Subjective:  Chief Complaint   Patient presents with    3 Month Follow-Up        Hypertension  Associated symptoms include shortness of breath. Pertinent negatives include no chest pain, headaches or palpitations. He saw GI for evaluation of his hematochezia and abdominal pain. Symptoms resolved. Colonoscopy was done on 2020 and did not show any polyps. This showed diverticulosis and nonbleeding hemorrhoids. He no longer has any rectal bleeding. Patient has a history of COPD . He also has chronic hypoxic respiratory failure on oxygen . When previously seen at pulmonary office he failed his  6-minute walk test.  . He will stay on his oxygen until then. He tells me that he takes it off occasionally when he goes and works outside, when he comes back his O2 saturation on room air is over 90%. We will let pulmonary decide whether to discontinue his oxygen. CT scan of his chest last year revealed spiculated lesion in the right lung which is unchanged. They thought it might be scarring from his previous pneumonia. He will have a recheck noncontrast CT scan of his chest he saw Dr. Kristan Adams wants a repeat CT scan of his chest.  We will get this ordered. It was supposed to be done last year but somehow got missed. He has a history of multifocal atrial tachycardia while he was in the hospital.  He was placed on diltiazem and he has noticed that his ankles are more swollen at the end of the day. No worsening shortness of breath. He saw cardiology for follow-up in 2019. Reviewed Dr. Nirmala Crocker office visit note    He complains of pain in his feet and calves when he walks. Relieved by rest.  He noticed that the soles of his feet are very sensitive but he says that it has been that way since he was a child.   Because of his previous history of nicotine use, will need to make sure he does not have peripheral vascular disease. He had an ultrasound of his abdominal aorta to rule out aortic aneurysm because he was a heavy smoker. No sign of any aneurysm. DL was done which showed some possibility of decreased blood flow to both lower extremities. CTA of his abdominal aorta with runoff was done which showed no evidence of aortic aneurysm and good blood flow to his legs. I think he might have some element of lumbar radiculopathy. He complains of numbness and tingling and pain of his legs. We started him on gabapentin his last visit and he is going to increase it to 300 mg a day then he will start taking 300 mg twice a day and then after a while take 300 milligrams 3 times a day. He was scheduled for nerve conduction test with a never got them done. [place] He still has problems with occasional pain in his right lower extremity depending upon what he is doing. Occasionally his right leg would swell. He says is getting a little better with physical therapy     Reviewed blood work in which his white blood cell count was elevated. This was after he was treated for pneumonia. He also has slightly elevated fasting blood sugar. Repeat CBC showed normal WBC count. His LDL was elevated at 120 in December 2020. Fasting blood sugar was 103. Liver and kidney functions were normal  His cardiovascular risk factor is also elevated. Discussed this with him. The recommendation was that he start on a statin. He wants to think about this. He is not sure if he wants to go on another medication. His cardiologist also told him to discontinue his enteric-coated aspirin. We will recheck his lipids today and if they are still elevated I will talk to him again about primary prevention using statin. He had his first Covid vaccination on 3/17/2021. Review of Systems   Constitutional: Negative for appetite change, fatigue and unexpected weight change.    HENT: Negative for congestion, ear pain, facial swelling, rhinorrhea, sore throat, tinnitus and trouble swallowing. Eyes: Negative for photophobia, pain, discharge, itching and visual disturbance. Respiratory: Positive for shortness of breath. Negative for cough, wheezing and stridor. Going up and down steps   Cardiovascular: Positive for leg swelling. Negative for chest pain and palpitations. Gastrointestinal: Negative for abdominal pain, anal bleeding, blood in stool, constipation, diarrhea, nausea and vomiting. Endocrine: Negative for cold intolerance, heat intolerance, polydipsia, polyphagia and polyuria. Genitourinary: Negative for difficulty urinating, dysuria, flank pain, frequency, hematuria and urgency. Musculoskeletal: Positive for arthralgias. Negative for gait problem, joint swelling and myalgias. See HPI   Skin: Negative for color change, pallor and rash. Allergic/Immunologic: Negative for environmental allergies and food allergies. Neurological: Negative for dizziness, tremors, seizures, syncope, speech difficulty, weakness, light-headedness, numbness and headaches. Hematological: Negative for adenopathy. Does not bruise/bleed easily. Psychiatric/Behavioral: Negative for agitation, behavioral problems, confusion, sleep disturbance and suicidal ideas. The patient is not nervous/anxious. Current Outpatient Medications:     fluticasone-salmeterol (ADVAIR DISKUS) 100-50 MCG/DOSE diskus inhaler, Inhale 1 puff into the lungs 2 times daily, Disp: 1 Inhaler, Rfl: 5    gabapentin (NEURONTIN) 300 MG capsule, Take 1 capsule by mouth 3 times daily for 180 days.  Intended supply: 90 days, Disp: 270 capsule, Rfl: 1    ipratropium-albuterol (DUONEB) 0.5-2.5 (3) MG/3ML SOLN nebulizer solution, Take 3 mLs by nebulization every 6 hours as needed for Shortness of Breath, Disp: 360 mL, Rfl: 3    albuterol sulfate HFA (PROAIR HFA) 108 (90 Base) MCG/ACT inhaler, Inhale into the lungs, Disp: , Rfl:     DILT- MG extended release capsule, Take 1 capsule by mouth daily, Disp: 90 capsule, Rfl: 3    furosemide (LASIX) 20 MG tablet, Take 1 tablet by mouth daily as needed (swelling), Disp: 30 tablet, Rfl: 5     No Known Allergies     Past Medical History:   Diagnosis Date    Alcohol abuse     Alcohol intake above recommended sensible limits 5/20/2019    COPD (chronic obstructive pulmonary disease) (HCC)     Hematochezia due to medication 12/9/2019    Multifocal atrial tachycardia (HCC)     paroxysmal    Nicotine dependence     Right sided colitis 12/9/2019       Health Maintenance Due   Topic Date Due    Hepatitis C screen  Never done    COVID-19 Vaccine (1) Never done    Diabetes screen  Never done    Shingles Vaccine (1 of 2) Never done    Low dose CT lung screening  11/06/2020        Patient Active Problem List   Diagnosis    Lung mass    Multifocal atrial tachycardia (HCC)    Mixed hyperlipidemia    Lumbar arthropathy    Lumbar radiculopathy    Nodule of upper lobe of left lung    COPD (chronic obstructive pulmonary disease) ([facility] [address]    Essential hypertension    Need for shingles vaccine    Hyperglycemia    Localized edema    Chronic respiratory failure with hypoxia (HCC)        Past Surgical History:   Procedure Laterality Date    TONSILLECTOMY          Family History   Problem Relation Age of Onset    No Known Problems Mother     Heart Attack Father         Social History     Tobacco Use    Smoking status: Former Smoker     Packs/day: 1.50     Years: 45.00     Pack years: 67.50     Types: Cigarettes     Start date: 12/22/1973     Quit date: 1/1/2018     Years since quitting: 3.2    Smokeless tobacco: Never Used   Substance Use Topics    Alcohol use: Not Currently     Frequency: Never    Drug use: Never        Objective  Vitals:    03/18/21 0828 03/18/21 0832   BP: (!) 142/80 (!) 142/80   Pulse: 85    Resp: 18    Temp: 97 °F (36.1 °C)    TempSrc: Temporal    SpO2: 93%  Comment: 2 L    Weight: 182 lb (82.6 kg)    Height: 5' 8\" (1.727 m)         Exam:  Physical Exam  Vitals signs reviewed. Constitutional:       General: He is not in acute distress. Appearance: Normal appearance. He is well-developed and normal weight. He is not ill-appearing. HENT:      Head: Normocephalic and atraumatic. Right Ear: External ear normal.      Left Ear: External ear normal.   Eyes:      General: No scleral icterus. Right eye: No discharge. Left eye: No discharge. Conjunctiva/sclera: Conjunctivae normal.      Pupils: Pupils are equal, round, and reactive to light. Neck:      Musculoskeletal: Normal range of motion and neck supple. No muscular tenderness. Thyroid: No thyromegaly. Vascular: No carotid bruit. Cardiovascular:      Rate and Rhythm: Normal rate and regular rhythm. Heart sounds: Normal heart sounds. No murmur. No friction rub. No gallop. Comments: Peripheral pulses are lightly decreased, DP and PT  Pulmonary:      Effort: Pulmonary effort is normal. No respiratory distress. Breath sounds: Decreased breath sounds present. No wheezing or rales. Chest:      Chest wall: No tenderness. Abdominal:      General: Bowel sounds are normal. There is no distension. Palpations: Abdomen is soft. There is no mass. Tenderness: There is no abdominal tenderness. There is no guarding or rebound. Musculoskeletal: Normal range of motion. General: Swelling present. No tenderness or deformity. Comments: Right ankle has 1+ edema,    Lymphadenopathy:      Cervical: No cervical adenopathy. Skin:     General: Skin is warm and dry. Coloration: Skin is not pale. Findings: No erythema or rash. Neurological:      General: No focal deficit present. Mental Status: He is alert and oriented to person, place, and time. Cranial Nerves: No cranial nerve deficit. Sensory: No sensory deficit.       Deep Tendon Reflexes: Reflexes normal.   Psychiatric:         Mood and

## 2021-05-04 PROBLEM — R09.02 HYPOXIA: Status: ACTIVE | Noted: 2021-05-04

## 2021-06-22 ENCOUNTER — OFFICE VISIT (OUTPATIENT)
Dept: PRIMARY CARE CLINIC | Age: 66
End: 2021-06-22
Payer: MEDICARE

## 2021-06-22 VITALS
HEART RATE: 77 BPM | BODY MASS INDEX: 26.98 KG/M2 | HEIGHT: 68 IN | TEMPERATURE: 98.2 F | SYSTOLIC BLOOD PRESSURE: 138 MMHG | OXYGEN SATURATION: 96 % | WEIGHT: 178 LBS | DIASTOLIC BLOOD PRESSURE: 64 MMHG

## 2021-06-22 DIAGNOSIS — M54.16 LUMBAR RADICULOPATHY: ICD-10-CM

## 2021-06-22 DIAGNOSIS — I10 ESSENTIAL HYPERTENSION: ICD-10-CM

## 2021-06-22 DIAGNOSIS — J43.9 PULMONARY EMPHYSEMA, UNSPECIFIED EMPHYSEMA TYPE (HCC): ICD-10-CM

## 2021-06-22 DIAGNOSIS — E78.2 MIXED HYPERLIPIDEMIA: ICD-10-CM

## 2021-06-22 DIAGNOSIS — I47.1 MULTIFOCAL ATRIAL TACHYCARDIA (HCC): ICD-10-CM

## 2021-06-22 DIAGNOSIS — J96.11 CHRONIC RESPIRATORY FAILURE WITH HYPOXIA (HCC): Primary | ICD-10-CM

## 2021-06-22 DIAGNOSIS — R60.0 LOCALIZED EDEMA: ICD-10-CM

## 2021-06-22 PROCEDURE — 99213 OFFICE O/P EST LOW 20 MIN: CPT | Performed by: INTERNAL MEDICINE

## 2021-06-22 RX ORDER — FUROSEMIDE 20 MG/1
20 TABLET ORAL DAILY PRN
Qty: 30 TABLET | Refills: 5 | Status: SHIPPED
Start: 2021-06-22 | End: 2022-03-21

## 2021-06-22 SDOH — ECONOMIC STABILITY: FOOD INSECURITY: WITHIN THE PAST 12 MONTHS, THE FOOD YOU BOUGHT JUST DIDN'T LAST AND YOU DIDN'T HAVE MONEY TO GET MORE.: NEVER TRUE

## 2021-06-22 SDOH — ECONOMIC STABILITY: FOOD INSECURITY: WITHIN THE PAST 12 MONTHS, YOU WORRIED THAT YOUR FOOD WOULD RUN OUT BEFORE YOU GOT MONEY TO BUY MORE.: NEVER TRUE

## 2021-06-22 ASSESSMENT — ENCOUNTER SYMPTOMS
NAUSEA: 0
VOMITING: 0
EYE ITCHING: 0
ANAL BLEEDING: 0
ABDOMINAL PAIN: 0
DIARRHEA: 0
TROUBLE SWALLOWING: 0
EYE DISCHARGE: 0
EYE PAIN: 0
SHORTNESS OF BREATH: 1
FACIAL SWELLING: 0
BLOOD IN STOOL: 0
STRIDOR: 0
COUGH: 0
SORE THROAT: 0
CONSTIPATION: 0
WHEEZING: 0
PHOTOPHOBIA: 0
RHINORRHEA: 0
COLOR CHANGE: 0

## 2021-06-22 ASSESSMENT — SOCIAL DETERMINANTS OF HEALTH (SDOH): HOW HARD IS IT FOR YOU TO PAY FOR THE VERY BASICS LIKE FOOD, HOUSING, MEDICAL CARE, AND HEATING?: NOT HARD AT ALL

## 2021-06-22 NOTE — PROGRESS NOTES
2021    Name: Sandeep Borjas : 1955 Sex: male  Age: 74 y.o. Subjective:  Chief Complaint   Patient presents with    Hypertension        Hypertension  Associated symptoms include shortness of breath. Pertinent negatives include no chest pain, headaches or palpitations. He saw GI for evaluation of his hematochezia and abdominal pain. Symptoms resolved. Colonoscopy was done on 2020 and did not show any polyps. This showed diverticulosis and nonbleeding hemorrhoids. He no longer has any rectal bleeding. Patient has a history of COPD . He also has chronic hypoxic respiratory failure on oxygen . When previously seen at pulmonary office he failed his  6-minute walk test.  . He will stay on his oxygen until then. He tells me that he takes it off occasionally when he goes and works outside, when he comes back his O2 saturation on room air is over 90%. We will let pulmonary decide whether to discontinue his oxygen. CT scan of his chest last year revealed spiculated lesion in the right lung which is unchanged. They thought it might be scarring from his previous pneumonia. Repeat CT scan of his chest in 2021 showed a stable 15 mm right upper lobe pulmonary nodule dating back to 2018, chronic appearing volume loss right upper lobe and lingula most probably secondary to scarring. Rest of the lung fields remain clear. Marie Ramirez He saw pulmonologist Dr. Darrian Gaona. He is on Advair 150/51 puff twice daily and he feels much better. He no longer uses his rescue inhalers much. He also told him that he could use his oxygen when he exerts himself and always at night when he sleeps. He has a history of multifocal atrial tachycardia while he was in the hospital.  He was placed on diltiazem and he has noticed that his ankles are more swollen at the end of the day. No worsening shortness of breath. He saw cardiology for follow-up in 2019.   Reviewed Dr. Ismael Bryan office visit note    He complains of pain in his feet and calves when he walks. Relieved by rest.  He noticed that the soles of his feet are very sensitive but he says that it has been that way since he was a child. Because of his previous history of nicotine use, will need to make sure he does not have peripheral vascular disease. He had an ultrasound of his abdominal aorta to rule out aortic aneurysm because he was a heavy smoker. No sign of any aneurysm. DL was done which showed some possibility of decreased blood flow to both lower extremities. CTA of his abdominal aorta with runoff was done which showed no evidence of aortic aneurysm and good blood flow to his legs. I think he might have some element of lumbar radiculopathy. He complains of numbness and tingling and pain of his legs. We started him on gabapentin his last visit and he is going to increase it to 300 mg a day then he will start taking 300 mg twice a day and then after a while take 300 milligrams 3 times a day. He was scheduled for nerve conduction test with a never got them done. Elma Brittle He still has problems with occasional pain in his right lower extremity depending upon what he is doing. Occasionally his right leg would swell. He says is getting a little better with physical therapy     Reviewed blood work in which his white blood cell count was elevated. This was after he was treated for pneumonia. He also has slightly elevated fasting blood sugar. Repeat CBC showed normal WBC count. Repeat LDL in March 2021 was 117. Fasting blood sugar was 107 however his hemoglobin A1c was only 5.5%. He finishes COVID-19 vaccination series. He is up-to-date on his pneumonia vaccinations, Tdap and flu shots. He is thinking about getting the shingles shot. Review of Systems   Constitutional: Negative for appetite change, fatigue and unexpected weight change.    HENT: Negative for congestion, ear pain, facial swelling, rhinorrhea, sore throat, tinnitus and trouble swallowing. Eyes: Negative for photophobia, pain, discharge, itching and visual disturbance. Respiratory: Positive for shortness of breath. Negative for cough, wheezing and stridor. Going up and down steps   Cardiovascular: Positive for leg swelling. Negative for chest pain and palpitations. Gastrointestinal: Negative for abdominal pain, anal bleeding, blood in stool, constipation, diarrhea, nausea and vomiting. Endocrine: Negative for cold intolerance, heat intolerance, polydipsia, polyphagia and polyuria. Genitourinary: Negative for difficulty urinating, dysuria, flank pain, frequency, hematuria and urgency. Musculoskeletal: Positive for arthralgias. Negative for gait problem, joint swelling and myalgias. See HPI   Skin: Negative for color change, pallor and rash. Allergic/Immunologic: Negative for environmental allergies and food allergies. Neurological: Negative for dizziness, tremors, seizures, syncope, speech difficulty, weakness, light-headedness, numbness and headaches. Hematological: Negative for adenopathy. Does not bruise/bleed easily. Psychiatric/Behavioral: Negative for agitation, behavioral problems, confusion, sleep disturbance and suicidal ideas. The patient is not nervous/anxious. Current Outpatient Medications:     furosemide (LASIX) 20 MG tablet, Take 1 tablet by mouth daily as needed (swelling), Disp: 30 tablet, Rfl: 5    gabapentin (NEURONTIN) 300 MG capsule, Take 1 capsule by mouth 3 times daily for 180 days.  Intended supply: 90 days, Disp: 270 capsule, Rfl: 1    ipratropium-albuterol (DUONEB) 0.5-2.5 (3) MG/3ML SOLN nebulizer solution, Take 3 mLs by nebulization every 6 hours as needed for Shortness of Breath, Disp: 360 mL, Rfl: 3    albuterol sulfate HFA (PROAIR HFA) 108 (90 Base) MCG/ACT inhaler, Inhale into the lungs, Disp: , Rfl:     fluticasone-salmeterol (ADVAIR DISKUS) 100-50 MCG/DOSE diskus inhaler, Inhale 1 puff into the lungs 2 times daily, Disp: 1 Inhaler, Rfl: 5    DILT- MG extended release capsule, Take 1 capsule by mouth daily, Disp: 90 capsule, Rfl: 3     No Known Allergies     Past Medical History:   Diagnosis Date    Alcohol abuse     Alcohol intake above recommended sensible limits 5/20/2019    COPD (chronic obstructive pulmonary disease) ([facility] [address]     Hematochezia due to medication 12/9/2019    Multifocal atrial tachycardia (HCC)     paroxysmal    Nicotine dependence     Right sided colitis 12/9/2019       Health Maintenance Due   Topic Date Due    Hepatitis C screen  Never done    Shingles Vaccine (1 of 2) Never done        Patient Active Problem List   Diagnosis    Lung mass    Multifocal atrial tachycardia (HCC)    Mixed hyperlipidemia    Lumbar arthropathy    Lumbar radiculopathy    Nodule of upper lobe of left lung    COPD (chronic obstructive pulmonary disease) ([facility] [address]    Essential hypertension    Need for shingles vaccine    Hyperglycemia    Localized edema    Chronic respiratory failure with hypoxia ([facility] [address]    Hypoxia        Past Surgical History:   Procedure Laterality Date    TONSILLECTOMY          Family History   Problem Relation Age of Onset    No Known Problems Mother     Heart Attack Father         Social History     Tobacco Use    Smoking status: Former Smoker     Packs/day: 1.50     Years: 45.00     Pack years: 67.50     Types: Cigarettes     Start date: 12/22/1973     Quit date: 1/1/2018     Years since quitting: 3.4    Smokeless tobacco: Never Used   Substance Use Topics    Alcohol use: Not Currently    Drug use: Never        Objective  Vitals:    06/22/21 1118   BP: 138/64   Site: Right Upper Arm   Position: Sitting   Cuff Size: Medium Adult   Pulse: 77   Temp: 98.2 °F (36.8 °C)   TempSrc: Temporal   SpO2: 96%  Comment: with ox   Weight: 178 lb (80.7 kg)   Height: 5' 7.5\" (1.715 m)        Exam:  Physical Exam  Vitals reviewed.    Constitutional:       General: He is not in acute distress. Appearance: Normal appearance. He is well-developed and normal weight. He is not ill-appearing. HENT:      Head: Normocephalic and atraumatic. Right Ear: External ear normal.      Left Ear: External ear normal.   Eyes:      General: No scleral icterus. Right eye: No discharge. Left eye: No discharge. Conjunctiva/sclera: Conjunctivae normal.      Pupils: Pupils are equal, round, and reactive to light. Neck:      Thyroid: No thyromegaly. Vascular: No carotid bruit. Cardiovascular:      Rate and Rhythm: Normal rate and regular rhythm. Heart sounds: Normal heart sounds. No murmur heard. No friction rub. No gallop. Comments: Peripheral pulses are lightly decreased, DP and PT  Pulmonary:      Effort: Pulmonary effort is normal. No respiratory distress. Breath sounds: Decreased breath sounds present. No wheezing or rales. Chest:      Chest wall: No tenderness. Abdominal:      General: Bowel sounds are normal. There is no distension. Palpations: Abdomen is soft. There is no mass. Tenderness: There is no abdominal tenderness. There is no guarding or rebound. Musculoskeletal:         General: No swelling, tenderness or deformity. Normal range of motion. Cervical back: Normal range of motion and neck supple. No muscular tenderness. Lymphadenopathy:      Cervical: No cervical adenopathy. Skin:     General: Skin is warm and dry. Coloration: Skin is not pale. Findings: No erythema or rash. Neurological:      General: No focal deficit present. Mental Status: He is alert and oriented to person, place, and time. Cranial Nerves: No cranial nerve deficit. Sensory: No sensory deficit. Deep Tendon Reflexes: Reflexes normal.   Psychiatric:         Mood and Affect: Mood normal.         Behavior: Behavior normal.         Thought Content:  Thought content normal.         Judgment: Judgment normal.          Last labs reviewed. ASSESSMENT & PLAN :   Problem List        Circulatory    Multifocal atrial tachycardia (HCC)       no repeat of his irregular heart rate. He stays on his calcium channel blocker. He will follow with cardiology as directed         Essential hypertension     Blood pressures are stable. Continue medications and monitor blood pressures at home. Call office if systolics are over 561 over diastolics over 90. Respiratory    COPD (chronic obstructive pulmonary disease) (HCC)       continue Advair daily, rinse mouth after use, continue as needed nebulizer treatments         Relevant Medications    albuterol sulfate HFA (PROAIR HFA) 108 (90 Base) MCG/ACT inhaler    ipratropium-albuterol (DUONEB) 0.5-2.5 (3) MG/3ML SOLN nebulizer solution    Chronic respiratory failure with hypoxia (HCC) - Primary       continue oxygen as needed during the day but continuously at night            Nervous and Auditory    Lumbar radiculopathy       continue gabapentin as it helps his radiculopathy         Relevant Medications    gabapentin (NEURONTIN) 300 MG capsule       Other    Mixed hyperlipidemia       watch diet and will monitor lipids. If they start to go up will need to go on a statin         Relevant Medications    furosemide (LASIX) 20 MG tablet    Localized edema       resolved on as needed furosemide         Relevant Medications    furosemide (LASIX) 20 MG tablet           Return in about 3 months (around 9/22/2021), or htn, copd be fasting.        Cindy Abernathy, DO  6/22/2021

## 2021-06-22 NOTE — ASSESSMENT & PLAN NOTE
continue gabapentin as it helps his radiculopathy
resolved on as needed furosemide
watch diet and will monitor lipids.   If they start to go up will need to go on a statin
[facility] - [facility]   [phone] for spouse

## 2021-09-27 ENCOUNTER — TELEPHONE (OUTPATIENT)
Dept: PRIMARY CARE CLINIC | Age: 66
End: 2021-09-27

## 2021-09-27 ENCOUNTER — OFFICE VISIT (OUTPATIENT)
Dept: PRIMARY CARE CLINIC | Age: 66
End: 2021-09-27
Payer: MEDICARE

## 2021-09-27 VITALS
OXYGEN SATURATION: 92 % | WEIGHT: 176 LBS | HEART RATE: 88 BPM | BODY MASS INDEX: 26.67 KG/M2 | SYSTOLIC BLOOD PRESSURE: 130 MMHG | HEIGHT: 68 IN | TEMPERATURE: 97.5 F | DIASTOLIC BLOOD PRESSURE: 76 MMHG

## 2021-09-27 DIAGNOSIS — R91.1 NODULE OF UPPER LOBE OF LEFT LUNG: ICD-10-CM

## 2021-09-27 DIAGNOSIS — K42.9 UMBILICAL HERNIA WITHOUT OBSTRUCTION AND WITHOUT GANGRENE: Primary | ICD-10-CM

## 2021-09-27 DIAGNOSIS — I10 ESSENTIAL HYPERTENSION: ICD-10-CM

## 2021-09-27 DIAGNOSIS — J43.9 PULMONARY EMPHYSEMA, UNSPECIFIED EMPHYSEMA TYPE (HCC): ICD-10-CM

## 2021-09-27 DIAGNOSIS — E78.2 MIXED HYPERLIPIDEMIA: ICD-10-CM

## 2021-09-27 DIAGNOSIS — M54.16 LUMBAR RADICULOPATHY: ICD-10-CM

## 2021-09-27 DIAGNOSIS — J96.11 CHRONIC RESPIRATORY FAILURE WITH HYPOXIA (HCC): ICD-10-CM

## 2021-09-27 DIAGNOSIS — Z23 NEED FOR INFLUENZA VACCINATION: ICD-10-CM

## 2021-09-27 DIAGNOSIS — R73.9 HYPERGLYCEMIA: ICD-10-CM

## 2021-09-27 DIAGNOSIS — I47.1 MULTIFOCAL ATRIAL TACHYCARDIA (HCC): ICD-10-CM

## 2021-09-27 PROCEDURE — G0008 ADMIN INFLUENZA VIRUS VAC: HCPCS | Performed by: INTERNAL MEDICINE

## 2021-09-27 PROCEDURE — 90694 VACC AIIV4 NO PRSRV 0.5ML IM: CPT | Performed by: INTERNAL MEDICINE

## 2021-09-27 PROCEDURE — 99214 OFFICE O/P EST MOD 30 MIN: CPT | Performed by: INTERNAL MEDICINE

## 2021-09-27 RX ORDER — IPRATROPIUM BROMIDE AND ALBUTEROL SULFATE 2.5; .5 MG/3ML; MG/3ML
1 SOLUTION RESPIRATORY (INHALATION) EVERY 6 HOURS PRN
Qty: 360 ML | Refills: 3 | Status: SHIPPED | OUTPATIENT
Start: 2021-09-27

## 2021-09-27 RX ORDER — DILTIAZEM HYDROCHLORIDE 120 MG/1
120 CAPSULE, EXTENDED RELEASE ORAL DAILY
Qty: 90 CAPSULE | Refills: 3 | Status: SHIPPED
Start: 2021-09-27 | End: 2021-12-06 | Stop reason: SDUPTHER

## 2021-09-27 RX ORDER — GABAPENTIN 300 MG/1
300 CAPSULE ORAL 3 TIMES DAILY
Qty: 270 CAPSULE | Refills: 1 | Status: SHIPPED
Start: 2021-09-27 | End: 2022-05-02 | Stop reason: SDUPTHER

## 2021-09-27 RX ORDER — ALBUTEROL SULFATE 90 UG/1
2 AEROSOL, METERED RESPIRATORY (INHALATION) EVERY 6 HOURS PRN
Qty: 18 G | Refills: 3 | Status: SHIPPED | OUTPATIENT
Start: 2021-09-27

## 2021-09-27 ASSESSMENT — ENCOUNTER SYMPTOMS
DIARRHEA: 0
SHORTNESS OF BREATH: 1
ABDOMINAL PAIN: 1
WHEEZING: 0
NAUSEA: 0
EYE PAIN: 0
COUGH: 0
RHINORRHEA: 0
EYE DISCHARGE: 0
BLOOD IN STOOL: 0
EYE ITCHING: 0
VOMITING: 0
COLOR CHANGE: 0
TROUBLE SWALLOWING: 0
CONSTIPATION: 0
PHOTOPHOBIA: 0
ANAL BLEEDING: 0
FACIAL SWELLING: 0
STRIDOR: 0
SORE THROAT: 0

## 2021-09-27 ASSESSMENT — COPD QUESTIONNAIRES: COPD: 1

## 2021-09-27 NOTE — ASSESSMENT & PLAN NOTE
follow-up with pulmonary. Make sure he uses his albuterol HFA at least 3 times a day. He may need to switch from Advair to another long-acting inhaler.

## 2021-09-27 NOTE — ASSESSMENT & PLAN NOTE
Blood pressures are stable. Continue medications and monitor blood pressures at home.  Call office if systolics are over 238 over diastolics over 90.  check fasting CMP

## 2021-09-27 NOTE — PROGRESS NOTES
2021    Name: Evans García : 1955 Sex: male  Age: 74 y.o. Subjective:  Chief Complaint   Patient presents with    COPD     3 month visit and med refills        Hypertension  Associated symptoms include shortness of breath. Pertinent negatives include no chest pain, headaches or palpitations. COPD  He complains of shortness of breath. There is no cough or wheezing. Pertinent negatives include no appetite change, chest pain, ear pain, headaches, myalgias, rhinorrhea, sore throat or trouble swallowing. Patient has an umbilical hernia which has started to bother him. It protrudes quite a bit and is painful. When he starts to lift something and try and push something it hurts even more. He would like to see a surgeon about getting this fixed. Patient has a history of COPD . He also has chronic hypoxic respiratory failure on oxygen . When previously seen at pulmonary office he failed his  6-minute walk test.  . He will stay on his oxygen until then. He tells me that he takes it off occasionally when he goes and works outside, when he comes back his O2 saturation on room air is over 90%. Dr. Solomon  told him he could use his oxygen as needed during the day but definitely use it at night. .    Over the last week he  became  more short of breath. Usually it was just when he was going up and down stairs but now he walks over 30 feet he gets short of breath and has to rest.  He is on Advair 1 puff twice a day and albuterol HFA which she has not been using. I told him he really should try and use this at least 2 or 3 times a day and that might help him. He will be seeing his pulmonologist in early October and will he will talk to him about another medication for long-term use  He denies any fever, chills, nausea or vomiting. He still has a cough but that is chronic for him. CT scan of his chest last year revealed spiculated lesion in the right lung which is unchanged.   They thought it might be scarring from his previous pneumonia. Repeat CT scan of his chest in April 2021 showed a stable 15 mm right upper lobe pulmonary nodule dating back to 2018, chronic appearing volume loss right upper lobe and lingula most probably secondary to scarring. Rest of the lung fields remain clear. .    He has a history of multifocal atrial tachycardia while he was in the hospital.  He was placed on diltiazem and he has noticed that his ankles are more swollen at the end of the day. No worsening shortness of breath. He saw cardiology for follow-up in December 2019. Reviewed Dr. Henry Schools office visit note he uses furosemide 20 mg on a as needed basis for his ankle edema. That he has not had to use it for a while. He complains of pain in his feet and calves when he walks. Relieved by rest.  He noticed that the soles of his feet are very sensitive but he says that it has been that way since he was a child. Because of his previous history of nicotine use, will need to make sure he does not have peripheral vascular disease. He had an ultrasound of his abdominal aorta to rule out aortic aneurysm because he was a heavy smoker. No sign of any aneurysm. LD was done which showed some possibility of decreased blood flow to both lower extremities. CTA of his abdominal aorta with runoff was done which showed no evidence of aortic aneurysm and good blood flow to his legs. I think he might have some element of lumbar radiculopathy. He complains of numbness and tingling and pain of his legs. We started him on gabapentin his last visit and he is going to increase it to 300 mg a day then he will start taking 300 mg twice a day and then after a while take 300 milligrams 3 times a day. He was scheduled for nerve conduction test with a never got them done. Jewel Cordova His leg pain has improved tremendously since being on the gabapentin. When he lays on his left side and stands up he gets dizzy and has vertigo.   This only happens when he lays on his left side. No vertigo or dizziness when he is up and walking. Reviewed blood work in which his white blood cell count was elevated. This was after he was treated for pneumonia. He also has slightly elevated fasting blood sugar. Repeat CBC showed normal WBC count. Repeat LDL in March 2021 was 117. Fasting blood sugar was 107 however his hemoglobin A1c was only 5.5%. He finished his COVID-19 vaccination series. He is up-to-date on his pneumonia vaccinations, and Tdap. He had his Shingrix series at [facility] in [place] and will get a copy of the report. He will get his flu shot today t. Review of Systems   Constitutional: Negative for appetite change, fatigue and unexpected weight change. HENT: Negative for congestion, ear pain, facial swelling, rhinorrhea, sore throat, tinnitus and trouble swallowing. Eyes: Negative for photophobia, pain, discharge, itching and visual disturbance. Respiratory: Positive for shortness of breath. Negative for cough, wheezing and stridor. Going up and down steps   Cardiovascular: Positive for leg swelling. Negative for chest pain and palpitations. Gastrointestinal: Positive for abdominal pain. Negative for anal bleeding, blood in stool, constipation, diarrhea, nausea and vomiting. Umbilical hernia   Endocrine: Negative for cold intolerance, heat intolerance, polydipsia, polyphagia and polyuria. Genitourinary: Negative for difficulty urinating, dysuria, flank pain, frequency, hematuria and urgency. Musculoskeletal: Positive for arthralgias. Negative for gait problem, joint swelling and myalgias. Skin: Negative for color change, pallor and rash. Allergic/Immunologic: Negative for environmental allergies and food allergies. Neurological: Positive for dizziness. Negative for tremors, seizures, syncope, speech difficulty, weakness, light-headedness, numbness and headaches.         Left side   Hematological: Localized edema    Chronic respiratory failure with hypoxia (HCC)    Umbilical hernia without obstruction and without gangrene        Past Surgical History:   Procedure Laterality Date    TONSILLECTOMY          Family History   Problem Relation Age of Onset    No Known Problems Mother     Heart Attack Father         Social History     Tobacco Use    Smoking status: Former Smoker     Packs/day: 1.50     Years: 45.00     Pack years: 67.50     Types: Cigarettes     Start date: 12/22/1973     Quit date: 1/1/2018     Years since quitting: 3.7    Smokeless tobacco: Never Used   Substance Use Topics    Alcohol use: Not Currently    Drug use: Never        Objective  Vitals:    09/27/21 0904   BP: 130/76   Pulse: 88   Temp: 97.5 °F (36.4 °C)   SpO2: 92%   Weight: 176 lb (79.8 kg)   Height: 5' 7.5\" (1.715 m)        Exam:  Physical Exam  Vitals reviewed. Constitutional:       General: He is not in acute distress. Appearance: Normal appearance. He is well-developed and normal weight. He is not ill-appearing. Comments: Oxygen per nasal cannula at 2 L   HENT:      Head: Normocephalic and atraumatic. Right Ear: Tympanic membrane, ear canal and external ear normal. There is no impacted cerumen. Left Ear: Tympanic membrane, ear canal and external ear normal. There is no impacted cerumen. Eyes:      General: No scleral icterus. Right eye: No discharge. Left eye: No discharge. Conjunctiva/sclera: Conjunctivae normal.      Pupils: Pupils are equal, round, and reactive to light. Neck:      Thyroid: No thyromegaly. Vascular: No carotid bruit. Cardiovascular:      Rate and Rhythm: Normal rate and regular rhythm. Heart sounds: Normal heart sounds. No murmur heard. No friction rub. No gallop. Comments: Peripheral pulses are lightly decreased, DP and PT  Pulmonary:      Effort: Pulmonary effort is normal. No respiratory distress.       Breath sounds: Decreased breath sounds present. No wheezing or rales. Comments: Breath sounds are markedly decreased bilaterally  Chest:      Chest wall: No tenderness. Abdominal:      General: Bowel sounds are normal. There is no distension. Palpations: Abdomen is soft. There is no mass. Tenderness: There is no abdominal tenderness. There is no guarding or rebound. Hernia: A hernia is present. Comments: He has a protuberant umbilical hernia which is tender to palpation. Musculoskeletal:         General: No swelling, tenderness or deformity. Normal range of motion. Cervical back: Normal range of motion and neck supple. No muscular tenderness. Lymphadenopathy:      Cervical: No cervical adenopathy. Skin:     General: Skin is warm and dry. Coloration: Skin is not pale. Findings: No erythema or rash. Neurological:      General: No focal deficit present. Mental Status: He is alert and oriented to person, place, and time. Cranial Nerves: No cranial nerve deficit. Sensory: No sensory deficit. Deep Tendon Reflexes: Reflexes normal.   Psychiatric:         Mood and Affect: Mood normal.         Behavior: Behavior normal.         Thought Content: Thought content normal.         Judgment: Judgment normal.          Last labs reviewed. ASSESSMENT & PLAN :   Problem List        Circulatory    Multifocal atrial tachycardia (HCC)       continue diltiazem and follow-up with cardiology every year         Relevant Medications    DILT- MG extended release capsule    Essential hypertension     Blood pressures are stable. Continue medications and monitor blood pressures at home. Call office if systolics are over 658 over diastolics over 90.  check fasting CMP         Relevant Orders    Comprehensive Metabolic Panel       Respiratory    COPD (chronic obstructive pulmonary disease) ([facility] [address]       follow-up with pulmonary. Make sure he uses his albuterol HFA at least 3 times a day.   He may need to switch from Advair to another long-acting inhaler. Relevant Medications    fluticasone-salmeterol (ADVAIR DISKUS) 100-50 MCG/DOSE diskus inhaler    albuterol sulfate HFA (PROAIR HFA) 108 (90 Base) MCG/ACT inhaler    ipratropium-albuterol (DUONEB) 0.5-2.5 (3) MG/3ML SOLN nebulizer solution    Chronic respiratory failure with hypoxia (HCC)       continuous oxygen 2 L per nasal cannula as needed basis during the day and every night         Relevant Medications    fluticasone-salmeterol (ADVAIR DISKUS) 100-50 MCG/DOSE diskus inhaler       Nervous and Auditory    Lumbar radiculopathy       continue gabapentin as it helps. He still has not gotten his EMGs. Relevant Medications    gabapentin (NEURONTIN) 300 MG capsule       Other    Mixed hyperlipidemia    Relevant Medications    furosemide (LASIX) 20 MG tablet    DILT- MG extended release capsule    Other Relevant Orders    Lipid Panel    Need for influenza vaccination       Senior flu shot given         Relevant Orders    INFLUENZA, QUADV, ADJUVANTED, 72 YRS =, IM, PF, PREFILL SYR, 0.5ML (FLUAD) (Completed)    Nodule of upper lobe of left lung       continue yearly CT scan of his lungs         Hyperglycemia       check hemoglobin A1c         Relevant Orders    Hemoglobin W0G    Umbilical hernia without obstruction and without gangrene - Primary       referred to Dr. Abbott Fail and await evaluation         Relevant Orders    Verner Balboa, MD, General Surgery, [place]           Return in about 3 months (around 12/27/2021), or copd,AWV.        Liza Ashford, DO  9/27/2021

## 2021-10-05 DIAGNOSIS — J43.9 PULMONARY EMPHYSEMA, UNSPECIFIED EMPHYSEMA TYPE (HCC): ICD-10-CM

## 2021-10-05 DIAGNOSIS — J96.11 CHRONIC RESPIRATORY FAILURE WITH HYPOXIA (HCC): ICD-10-CM

## 2021-10-15 ENCOUNTER — OFFICE VISIT (OUTPATIENT)
Dept: SURGERY | Age: 66
End: 2021-10-15
Payer: MEDICARE

## 2021-10-15 VITALS
DIASTOLIC BLOOD PRESSURE: 72 MMHG | HEART RATE: 88 BPM | SYSTOLIC BLOOD PRESSURE: 148 MMHG | TEMPERATURE: 97.2 F | BODY MASS INDEX: 27.69 KG/M2 | WEIGHT: 176.4 LBS | HEIGHT: 67 IN

## 2021-10-15 DIAGNOSIS — K42.9 UMBILICAL HERNIA WITHOUT OBSTRUCTION AND WITHOUT GANGRENE: Primary | ICD-10-CM

## 2021-10-15 DIAGNOSIS — M62.08 RECTUS DIASTASIS: ICD-10-CM

## 2021-10-15 PROCEDURE — 99205 OFFICE O/P NEW HI 60 MIN: CPT | Performed by: SURGERY

## 2021-10-15 RX ORDER — SODIUM CHLORIDE 9 MG/ML
25 INJECTION, SOLUTION INTRAVENOUS PRN
Status: CANCELLED | OUTPATIENT
Start: 2021-10-15

## 2021-10-15 RX ORDER — SODIUM CHLORIDE 0.9 % (FLUSH) 0.9 %
10 SYRINGE (ML) INJECTION EVERY 12 HOURS SCHEDULED
Status: CANCELLED | OUTPATIENT
Start: 2021-10-15

## 2021-10-15 RX ORDER — SODIUM CHLORIDE 9 MG/ML
INJECTION, SOLUTION INTRAVENOUS CONTINUOUS
Status: CANCELLED | OUTPATIENT
Start: 2021-10-15

## 2021-10-15 RX ORDER — SODIUM CHLORIDE 0.9 % (FLUSH) 0.9 %
10 SYRINGE (ML) INJECTION PRN
Status: CANCELLED | OUTPATIENT
Start: 2021-10-15

## 2021-10-15 ASSESSMENT — ENCOUNTER SYMPTOMS
BLOOD IN STOOL: 0
ABDOMINAL PAIN: 0
COUGH: 0
SHORTNESS OF BREATH: 0
SORE THROAT: 0
WHEEZING: 0
CONSTIPATION: 0
PHOTOPHOBIA: 0
NAUSEA: 0
BACK PAIN: 0
EYE REDNESS: 0
VOMITING: 0
DIARRHEA: 0

## 2021-10-15 NOTE — PROGRESS NOTES
Consult Note    Dear Korey Garza DO, thank you for referring Dora Bennett for evaluation. Reason for Consult: Umbilical hernia    HISTORY OF PRESENT ILLNESS:    The patient is a 74 y.o. male who presents with complaints of an enlarging and somewhat tender umbilical hernia. He believes he has noticed this last 2 to 3 years. He does have history of COPD and is oxygen dependent. He denies any coughing. He did previously cough for many years, however this is resolved since he quit smoking 3 years ago. He does follow with pulmonary. Past Medical History:   Diagnosis Date    Alcohol abuse     Alcohol intake above recommended sensible limits 5/20/2019    COPD (chronic obstructive pulmonary disease) ([facility] [address]     Hematochezia due to medication 12/9/2019    Multifocal atrial tachycardia (HCC)     paroxysmal    Nicotine dependence     Right sided colitis 12/9/2019       Past Surgical History:   Procedure Laterality Date    TONSILLECTOMY         Prior to Admission medications    Medication Sig Start Date End Date Taking? Authorizing Provider   fluticasone-salmeterol (ADVAIR DISKUS) 100-50 MCG/DOSE diskus inhaler Inhale 1 puff into the lungs 2 times daily 10/5/21 11/4/21  Davida Brambila DO   albuterol sulfate HFA (PROAIR HFA) 108 (90 Base) MCG/ACT inhaler Inhale 2 puffs into the lungs every 6 hours as needed for Wheezing Inhale into the lungs 9/27/21   Davida Brambila DO   ipratropium-albuterol (DUONEB) 0.5-2.5 (3) MG/3ML SOLN nebulizer solution Take 3 mLs by nebulization every 6 hours as needed for Shortness of Breath 9/27/21   Davida Brambila DO   gabapentin (NEURONTIN) 300 MG capsule Take 1 capsule by mouth 3 times daily for 180 days.  Intended supply: 90 days 9/27/21 3/26/22  Davida Brambila DO   DILT- MG extended release capsule Take 1 capsule by mouth daily 9/27/21 9/22/22  Davida Brambila DO   furosemide (LASIX) 20 MG tablet Take 1 tablet by mouth daily as needed (swelling) 6/22/21 of Onset    No Known Problems Mother     Heart Attack Father        Review Of Systems:   Review of Systems   Constitutional: Negative for chills and fever. HENT: Negative for ear pain, nosebleeds, sore throat and tinnitus. Eyes: Negative for photophobia and redness. Respiratory: Negative for cough, shortness of breath and wheezing. Cardiovascular: Negative for chest pain and palpitations. Gastrointestinal: Negative for abdominal pain, blood in stool, constipation, diarrhea, nausea and vomiting. Hernia   Endocrine: Negative for polydipsia. Genitourinary: Negative for dysuria, hematuria and urgency. Musculoskeletal: Negative for back pain and neck pain. Skin: Negative for rash. Neurological: Negative for dizziness, tremors and seizures. Hematological: Does not bruise/bleed easily. All other systems reviewed and are negative. Physical Exam:  Vitals:    10/15/21 1020   BP: (!) 148/72   Site: Right Upper Arm   Pulse: 88   Temp: 97.2 °F (36.2 °C)   TempSrc: Temporal   Weight: 176 lb 6.4 oz (80 kg)   Height: 5' 7\" (1.702 m)       General: Well nourished, well developed, no acute distress  Eyes:  PERRL   Conjunctiva unremarkable   ENT:  TM's intact bilaterally, no effusion   Nasal:  No mucosal edema     No nasal drainage   Oral:  mucosa moist and pink   Neck:  Supple   No palpable cervical lymphoadenopathy   Thyroid without mass or enlargement  Resp: Lungs CTAB   Equal and adequate air exchange without accessory muscle use   No rales, rhonchi or wheeze  CV: S1S2 RRR   No murmur   Intact distal pulses   No edema  GI: Abdomen Soft, non tender, non distended. There is a reducible umbilical hernia defect about 2.5 cm. There is also a large rectus diastases above this.    Normoactive bowel sounds   No palpable hepatosplenomegaly  MS: Physiologic ROM of all extremities    Intact distal pulses   No clubbing or cyanosis   Skin Warm and dry; no rash or lesion  Neuro: Alert and oriented; normal and intact dtr's   Psych: Euthymic mood, congruent affect      No results found. Assessment/Plan:  3 78-year-old male with reducible tender umbilical hernia. We will plan for repair. In regards to his rectus diastases, I provided education on what this was, the fact that it was not a true hernia, and that he will still have a bulge in his upper abdomen following repair of his umbilical hernia. He expresses understanding. Because of his oxygen dependence and COPD, we will ask for preoperative pulmonary evaluation. Risks of the procedure were explained to the patient including but not limited to infection, bleeding, hernia recurrence, and possible need for removal of mesh. Patient expresses understanding of these risks and consents to the procedure.         Electronically signed by Nan Lorenzo DO on 10/15/21 at 11:25 AM EDT

## 2021-10-26 ENCOUNTER — OFFICE VISIT (OUTPATIENT)
Dept: FAMILY MEDICINE CLINIC | Age: 66
End: 2021-10-26
Payer: MEDICARE

## 2021-10-26 VITALS
TEMPERATURE: 97.5 F | DIASTOLIC BLOOD PRESSURE: 80 MMHG | WEIGHT: 178 LBS | HEART RATE: 81 BPM | SYSTOLIC BLOOD PRESSURE: 160 MMHG | OXYGEN SATURATION: 94 % | BODY MASS INDEX: 27.88 KG/M2

## 2021-10-26 DIAGNOSIS — J44.1 COPD WITH ACUTE EXACERBATION (HCC): ICD-10-CM

## 2021-10-26 DIAGNOSIS — J32.9 SINOBRONCHITIS: Primary | ICD-10-CM

## 2021-10-26 DIAGNOSIS — Z99.81 SUPPLEMENTAL OXYGEN DEPENDENT: ICD-10-CM

## 2021-10-26 DIAGNOSIS — J40 SINOBRONCHITIS: Primary | ICD-10-CM

## 2021-10-26 PROCEDURE — 99214 OFFICE O/P EST MOD 30 MIN: CPT | Performed by: PHYSICIAN ASSISTANT

## 2021-10-26 RX ORDER — PREDNISONE 20 MG/1
20 TABLET ORAL 2 TIMES DAILY
Qty: 10 TABLET | Refills: 0 | Status: SHIPPED | OUTPATIENT
Start: 2021-10-26 | End: 2021-10-31

## 2021-10-26 RX ORDER — DOXYCYCLINE HYCLATE 100 MG
100 TABLET ORAL 2 TIMES DAILY
Qty: 20 TABLET | Refills: 0 | Status: SHIPPED | OUTPATIENT
Start: 2021-10-26 | End: 2021-11-05

## 2021-10-26 NOTE — PROGRESS NOTES
Chief Complaint       Cough (2L O2, hx of COPD) and Congestion (x 1 week )      History of Present Illness   Source of history provided by:  patient. Bernadette Mack is a 74 y.o. old male presenting to the walk in clinic for evaluation of productive cough with green sputum, chest congestion, and intermittent shortness of breath with coughing fits which is been present for the past week. Patient does have a history of COPD which is typically controlled with daily Advair and as needed albuterol. Patient does report having to use his albuterol more often during this acute illness. Patient is on continuous oxygen for his COPD. He is on 2 L in office currently. Denies any fever, chills, loss of taste or smell, CP, dyspnea, LE edema, abdominal pain, vomiting, rash, or lethargy. Patient denies recent sick exposures. Patient has been vaccinated for COVID-19. ROS    Unless otherwise stated in this report or unable to obtain because of the patient's clinical or mental status as evidenced by the medical record, this patients's positive and negative responses for Review of Systems, constitutional, psych, eyes, ENT, cardiovascular, respiratory, gastrointestinal, neurological, genitourinary, musculoskeletal, integument systems and systems related to the presenting problem are either stated in the preceding or were not pertinent or were negative for the symptoms and/or complaints related to the medical problem. Past Medical History:  has a past medical history of Alcohol abuse, Alcohol intake above recommended sensible limits, COPD (chronic obstructive pulmonary disease) ([address] Hematochezia due to medication, Multifocal atrial tachycardia ([address] Nicotine dependence, and Right sided colitis. Past Surgical History:  has a past surgical history that includes Tonsillectomy. Social History:  reports that he quit smoking about 3 years ago. His smoking use included cigarettes. He started smoking about 47 years ago.  He has a 67.50 pack-year smoking history. He has never used smokeless tobacco. He reports previous alcohol use. He reports that he does not use drugs. Family History: family history includes Heart Attack in his father; No Known Problems in his mother. Allergies: Patient has no known allergies. Physical Exam         VS:  BP (!) 160/80   Pulse 81   Temp 97.5 °F (36.4 °C) (Temporal)   Wt 178 lb (80.7 kg)   SpO2 94% Comment: 2L  BMI 27.88 kg/m²    Oxygen Saturation Interpretation: Normal.    Constitutional:  Alert, development consistent with age. NAD. Head:  NC/NT. Airway patent. Mild TTP noted over the bilateral frontal, ethmoid, and maxillary sinuses. Mouth: Posterior pharynx with mild erythema and clear postnasal drip. No tonsillar hypertrophy or exudate. Neck:  Normal ROM. Supple. No anterior cervical adenopathy noted. Lungs: Breath sounds are diminished throughout without rales, wheezes, or rhonchi. Patient is breathing comfortably on exam without any signs of respiratory distress noted. CV:  Regular rate and rhythm, normal heart sounds, without pathological murmurs, ectopy, gallops, or rubs. Skin:  Normal turgor. Warm, dry, without visible rash. Lymphatic: No lymphangitis or adenopathy noted. Neurological:  Oriented. Motor functions intact. Lab / Imaging Results   (All laboratory and radiology results have been personally reviewed by myself)  Labs:  No results found for this visit on 10/26/21. Imaging: All Radiology results interpreted by Radiologist unless otherwise noted. Assessment / Plan     Impression(s):  Theo Rinaldi was seen today for cough and congestion. Diagnoses and all orders for this visit:    Sinobronchitis  -     doxycycline hyclate (VIBRA-TABS) 100 MG tablet; Take 1 tablet by mouth 2 times daily for 10 days  -     predniSONE (DELTASONE) 20 MG tablet;  Take 1 tablet by mouth 2 times daily for 5 days    COPD with acute exacerbation (HCC)  -     doxycycline hyclate (VIBRA-TABS) 100 MG tablet; Take 1 tablet by mouth 2 times daily for 10 days  -     predniSONE (DELTASONE) 20 MG tablet; Take 1 tablet by mouth 2 times daily for 5 days    Supplemental oxygen dependent      Disposition:  Disposition: Discharge to home. Symptoms are most consistent with acute sinobronchitis and COPD exacerbation. Prescription written for doxycycline and a prednisone burst, side effects discussed. Continue all other medications as prescribed. Increase fluids and rest.  Additional symptomatic relief discussed including Tylenol prn pain/fever. Schedule virtual f/u with PCP in 7-10 days if symptoms persist. ED sooner if symptoms worsen or change. ED immediately with high or refractory fever, progressive SOB, dyspnea, CP, calf pain/swelling, shaking chills, vomiting, abdominal pain, lethargy, flank pain, or decreased urinary output. Pt verbalizes understanding and is in agreement with plan of care. All questions answered. Emely White PA-C    **This report was transcribed using voice recognition software. Every effort was made to ensure accuracy; however, inadvertent computerized transcription errors may be present.

## 2021-10-27 PROBLEM — Z23 NEED FOR INFLUENZA VACCINATION: Status: RESOLVED | Noted: 2019-10-14 | Resolved: 2021-10-27

## 2021-12-06 DIAGNOSIS — I47.1 MULTIFOCAL ATRIAL TACHYCARDIA (HCC): ICD-10-CM

## 2021-12-06 RX ORDER — DILTIAZEM HYDROCHLORIDE 120 MG/1
120 CAPSULE, EXTENDED RELEASE ORAL DAILY
Qty: 90 CAPSULE | Refills: 3 | Status: SHIPPED | OUTPATIENT
Start: 2021-12-06 | End: 2022-12-01

## 2021-12-20 ENCOUNTER — OFFICE VISIT (OUTPATIENT)
Dept: PRIMARY CARE CLINIC | Age: 66
End: 2021-12-20
Payer: MEDICARE

## 2021-12-20 VITALS
BODY MASS INDEX: 27.28 KG/M2 | OXYGEN SATURATION: 92 % | HEART RATE: 84 BPM | RESPIRATION RATE: 16 BRPM | TEMPERATURE: 97.8 F | WEIGHT: 180 LBS | HEIGHT: 68 IN | DIASTOLIC BLOOD PRESSURE: 70 MMHG | SYSTOLIC BLOOD PRESSURE: 132 MMHG

## 2021-12-20 DIAGNOSIS — E78.00 PURE HYPERCHOLESTEROLEMIA: ICD-10-CM

## 2021-12-20 DIAGNOSIS — R73.9 HYPERGLYCEMIA: ICD-10-CM

## 2021-12-20 DIAGNOSIS — K42.9 UMBILICAL HERNIA WITHOUT OBSTRUCTION AND WITHOUT GANGRENE: ICD-10-CM

## 2021-12-20 DIAGNOSIS — J96.11 CHRONIC RESPIRATORY FAILURE WITH HYPOXIA (HCC): ICD-10-CM

## 2021-12-20 DIAGNOSIS — Z12.5 SCREENING FOR PROSTATE CANCER: ICD-10-CM

## 2021-12-20 DIAGNOSIS — J43.9 PULMONARY EMPHYSEMA, UNSPECIFIED EMPHYSEMA TYPE (HCC): ICD-10-CM

## 2021-12-20 DIAGNOSIS — Z00.00 ROUTINE GENERAL MEDICAL EXAMINATION AT A HEALTH CARE FACILITY: ICD-10-CM

## 2021-12-20 DIAGNOSIS — I10 ESSENTIAL HYPERTENSION: ICD-10-CM

## 2021-12-20 DIAGNOSIS — I10 ESSENTIAL HYPERTENSION: Primary | ICD-10-CM

## 2021-12-20 LAB
ANION GAP SERPL CALCULATED.3IONS-SCNC: 9 MMOL/L (ref 7–16)
BUN BLDV-MCNC: 12 MG/DL (ref 6–23)
CALCIUM SERPL-MCNC: 9.3 MG/DL (ref 8.6–10.2)
CHLORIDE BLD-SCNC: 104 MMOL/L (ref 98–107)
CHOLESTEROL, TOTAL: 229 MG/DL (ref 0–199)
CO2: 27 MMOL/L (ref 22–29)
CREAT SERPL-MCNC: 0.8 MG/DL (ref 0.7–1.2)
GFR AFRICAN AMERICAN: >60
GFR NON-AFRICAN AMERICAN: >60 ML/MIN/1.73
GLUCOSE BLD-MCNC: 91 MG/DL (ref 74–99)
HDLC SERPL-MCNC: 43 MG/DL
LDL CHOLESTEROL CALCULATED: 161 MG/DL (ref 0–99)
POTASSIUM SERPL-SCNC: 5 MMOL/L (ref 3.5–5)
PROSTATE SPECIFIC ANTIGEN: 1.61 NG/ML (ref 0–4)
SODIUM BLD-SCNC: 140 MMOL/L (ref 132–146)
TRIGL SERPL-MCNC: 125 MG/DL (ref 0–149)
VLDLC SERPL CALC-MCNC: 25 MG/DL

## 2021-12-20 PROCEDURE — 99214 OFFICE O/P EST MOD 30 MIN: CPT | Performed by: INTERNAL MEDICINE

## 2021-12-20 PROCEDURE — G0439 PPPS, SUBSEQ VISIT: HCPCS | Performed by: INTERNAL MEDICINE

## 2021-12-20 ASSESSMENT — PATIENT HEALTH QUESTIONNAIRE - PHQ9
SUM OF ALL RESPONSES TO PHQ QUESTIONS 1-9: 0
SUM OF ALL RESPONSES TO PHQ QUESTIONS 1-9: 0
1. LITTLE INTEREST OR PLEASURE IN DOING THINGS: 0
SUM OF ALL RESPONSES TO PHQ QUESTIONS 1-9: 0
2. FEELING DOWN, DEPRESSED OR HOPELESS: 0
SUM OF ALL RESPONSES TO PHQ9 QUESTIONS 1 & 2: 0

## 2021-12-20 ASSESSMENT — LIFESTYLE VARIABLES
HOW MANY STANDARD DRINKS CONTAINING ALCOHOL DO YOU HAVE ON A TYPICAL DAY: 0
HOW OFTEN DURING THE LAST YEAR HAVE YOU HAD A FEELING OF GUILT OR REMORSE AFTER DRINKING: 0
HOW OFTEN DURING THE LAST YEAR HAVE YOU FAILED TO DO WHAT WAS NORMALLY EXPECTED FROM YOU BECAUSE OF DRINKING: 0
HOW OFTEN DURING THE LAST YEAR HAVE YOU BEEN UNABLE TO REMEMBER WHAT HAPPENED THE NIGHT BEFORE BECAUSE YOU HAD BEEN DRINKING: 0
AUDIT-C TOTAL SCORE: 3
HOW OFTEN DURING THE LAST YEAR HAVE YOU FOUND THAT YOU WERE NOT ABLE TO STOP DRINKING ONCE YOU HAD STARTED: 0
HOW OFTEN DO YOU HAVE SIX OR MORE DRINKS ON ONE OCCASION: 0
HAVE YOU OR SOMEONE ELSE BEEN INJURED AS A RESULT OF YOUR DRINKING: 0
AUDIT TOTAL SCORE: 3
HOW OFTEN DO YOU HAVE A DRINK CONTAINING ALCOHOL: 3
HOW OFTEN DURING THE LAST YEAR HAVE YOU NEEDED AN ALCOHOLIC DRINK FIRST THING IN THE MORNING TO GET YOURSELF GOING AFTER A NIGHT OF HEAVY DRINKING: 0
HAS A RELATIVE, FRIEND, DOCTOR, OR ANOTHER HEALTH PROFESSIONAL EXPRESSED CONCERN ABOUT YOUR DRINKING OR SUGGESTED YOU CUT DOWN: 0

## 2021-12-20 ASSESSMENT — ENCOUNTER SYMPTOMS
COUGH: 0
PHOTOPHOBIA: 0
COLOR CHANGE: 0
FACIAL SWELLING: 0
EYE DISCHARGE: 0
NAUSEA: 0
BLOOD IN STOOL: 0
TROUBLE SWALLOWING: 0
ABDOMINAL PAIN: 1
CONSTIPATION: 0
WHEEZING: 0
VOMITING: 0
DIARRHEA: 0
ANAL BLEEDING: 0
EYE PAIN: 0
STRIDOR: 0
EYE ITCHING: 0
RHINORRHEA: 0
SHORTNESS OF BREATH: 1
SORE THROAT: 0

## 2021-12-20 ASSESSMENT — COPD QUESTIONNAIRES: COPD: 1

## 2021-12-20 NOTE — PROGRESS NOTES
Medicare Annual Wellness Visit  Name: Yesenia President Date: 2021   MRN: <[id number]> Sex: Male   Age: 74 y.o. Ethnicity: Non- / Non    : 1955 Race: White (non-)      Vilma Jon is here for Medicare AWV and Hypertension    Screenings for behavioral, psychosocial and functional/safety risks, and cognitive dysfunction are all negative except as indicated below. These results, as well as other patient data from the [address] form, are documented in Flowsheets linked to this Encounter. No Known Allergies    Prior to Visit Medications    Medication Sig Taking? Authorizing Provider   DILT- MG extended release capsule Take 1 capsule by mouth daily Yes Davida Brambila DO   tiotropium (SPIRIVA RESPIMAT) 2.5 MCG/ACT AERS inhaler Inhale 2 puffs into the lungs daily Yes Elza Chavez MD   albuterol sulfate HFA (PROAIR HFA) 108 (90 Base) MCG/ACT inhaler Inhale 2 puffs into the lungs every 6 hours as needed for Wheezing Inhale into the lungs Yes Davida Brambila DO   ipratropium-albuterol (DUONEB) 0.5-2.5 (3) MG/3ML SOLN nebulizer solution Take 3 mLs by nebulization every 6 hours as needed for Shortness of Breath Yes Davida Brambila DO   gabapentin (NEURONTIN) 300 MG capsule Take 1 capsule by mouth 3 times daily for 180 days. Intended supply: 90 days Yes Davida Brambila DO   fluticasone-salmeterol (ADVAIR DISKUS) 100-50 MCG/DOSE diskus inhaler Inhale 1 puff into the lungs 2 times daily  Davida Brambila DO   furosemide (LASIX) 20 MG tablet Take 1 tablet by mouth daily as needed (swelling)  Davida Brambila DO   gabapentin (NEURONTIN) 300 MG capsule Take 1 capsule by mouth 3 times daily for 180 days.  Intended supply: 90 days  Seng Ochoa DO       Past Medical History:   Diagnosis Date    Alcohol abuse     Alcohol intake above recommended sensible limits 2019    COPD [facility]ch[facility] obstructive pulmonary disease) ([facility] [address]     Hematochezia due to medication 2019    Multifocal atrial tachycardia (HCC)     paroxysmal    Nicotine dependence     Right sided colitis 12/9/2019       Past Surgical History:   Procedure Laterality Date    TONSILLECTOMY         Family History   Problem Relation Age of Onset    No Known Problems Mother     Heart Attack Father        CareTeam (Including outside providers/suppliers regularly involved in providing care):   Patient Care Team:  Sharla Reyna DO as PCP - General (Internal Medicine)  Sharla Reyna DO as PCP - [facility] Provider  Nayana Morgan MD as Consulting Physician (Pulmonary Disease)    Wt Readings from Last 3 Encounters:   12/20/21 180 lb (81.6 kg)   11/09/21 176 lb (79.8 kg)   10/26/21 178 lb (80.7 kg)     Vitals:    12/20/21 0930   BP: 132/70   Pulse: 84   Resp: 16   Temp: 97.8 °F (36.6 °C)   SpO2: 92%   Weight: 180 lb (81.6 kg)   Height: 5' 7.5\" (1.715 m)     Body mass index is 27.78 kg/m². Based upon direct observation of the patient, evaluation of cognition reveals recent and remote memory intact. Patient's complete Health Risk Assessment and screening values have been reviewed and are found in Flowsheets. The following problems were reviewed today and where indicated follow up appointments were made and/or referrals ordered. Positive Risk Factor Screenings with Interventions:          General Health and ACP:  General  In general, how would you say your health is?: Good  In the past 7 days, have you experienced any of the following?  New or Increased Pain, New or Increased Fatigue, Loneliness, Social Isolation, Stress or Anger?: None of These  Do you get the social and emotional support that you need?: Yes  Do you have a Living Will?: Yes  Advance Directives     Power of [address] Will ACP-Advance Directive ACP-Power of     Not on File Not on File Not on File Not on File      General Health Risk Interventions:  · No Living Will: ACP documents already completed- patient asked to provide copy to the office    Health Habits/Nutrition:  Health Habits/Nutrition  Do you exercise for at least 20 minutes 2-3 times per week?: (!) No  Have you lost any weight without trying in the past 3 months?: No  Do you eat only one meal per day?: No  Have you seen the dentist within the past year?: Yes  Body mass index: (!) 27.77  Health Habits/Nutrition Interventions:  · Inadequate physical activity:  patient is not ready to increase his/her physical activity level at this time   · Will watch diet and portion control     Safety:  Safety  Do you have working smoke detectors?: Yes  Have all throw rugs been removed or fastened?: Yes  Do you have non-slip mats or surfaces in all bathtubs/showers?: Yes  Do all of your stairways have a railing or banister?: Yes  Are your doorways, halls and stairs free of clutter?: Yes  Do you always fasten your seatbelt when you are in a car?: (!) No  Safety Interventions:  · encouraged to fasten your sealbelt all the time     Personalized Preventive Plan   Current Health Maintenance Status  Immunization History   Administered Date(s) Administered    COVID-19, [place], Primary or Immunocompromised, PF, 100mcg/0.5mL 03/17/2021, 04/14/2021, 12/03/2021    Influenza, High Dose (Fluzone 65 yrs and older) 11/28/2018    Influenza, Quadv, adjuvanted, 65 yrs +, IM, PF (Fluad) 10/15/2020, 09/27/2021    Influenza, Triv, inactivated, subunit, adjuvanted, IM (Fluad 65 yrs and older) 10/14/2019    Pneumococcal Conjugate 13-valent ([id number]) 11/29/2018    Pneumococcal Polysaccharide ([id number]) 12/09/2019    Tdap (Boostrix, Adacel) 03/14/2020    Zoster Recombinant (Shingrix) 01/06/2021, 07/21/2021        Health Maintenance   Topic Date Due    Hepatitis C screen  Never done    Annual Wellness Visit (AWV)  12/18/2021    Low dose CT lung screening  04/26/2022    A1C test (Diabetic or Prediabetic)  09/27/2022    Potassium monitoring  09/27/2022    Creatinine monitoring  09/27/2022    Lipid screen 09/27/2026    Colon cancer screen colonoscopy  02/12/2030    DTaP/Tdap/Td vaccine (2 - Td or Tdap) 03/14/2030    Flu vaccine  Completed    Shingles Vaccine  Completed    Pneumococcal 65+ years Vaccine  Completed    COVID-19 Vaccine  Completed    AAA screen  Completed    Hepatitis A vaccine  Aged Out    Hepatitis B vaccine  Aged Out    Hib vaccine  Aged Out    Meningococcal (ACWY) vaccine  Aged Out     Recommendations for itzat Due: see orders and patient instructions/AVS.  . Recommended screening schedule for the next 5-10 years is provided to the patient in written form: see Patient Instructions/AVS.    There are no diagnoses linked to this encounter. Cardiovascular Disease Risk Counseling: Assessed the patient's risk to develop cardiovascular disease and reviewed main risk factors. Reviewed steps to reduce disease risk including:   · Quitting tobacco use, reducing amount smoked, or not starting the habit  · Making healthy food choices  · Being physically active and gradualy increasing activity levels   · Reduce weight and determine a healthy BMI goal  · Monitor blood pressure and treat if higher than 140/90 mmHg  · Maintain blood total cholesterol levels under 5 mmol/l or 190 mg/dl  · Maintain LDL cholesterol levels under 3.0 mmol/l or 115 mg/dl   · Control blood glucose levels  · Consider taking aspirin (75 mg daily), once blood pressure is controlled   Provided a follow up plan.   Time spent (minutes): 6

## 2021-12-20 NOTE — PATIENT INSTRUCTIONS
Personalized Preventive Plan for Loretta Virk - 12/20/2021  Medicare offers a range of preventive health benefits. Some of the tests and screenings are paid in full while other may be subject to a deductible, co-insurance, and/or copay. Some of these benefits include a comprehensive review of your medical history including lifestyle, illnesses that may run in your family, and various assessments and screenings as appropriate. After reviewing your medical record and screening and assessments performed today your provider may have ordered immunizations, labs, imaging, and/or referrals for you. A list of these orders (if applicable) as well as your Preventive Care list are included within your After Visit Summary for your review. Other Preventive Recommendations:    · A preventive eye exam performed by an eye specialist is recommended every 1-2 years to screen for glaucoma; cataracts, macular degeneration, and other eye disorders. · A preventive dental visit is recommended every 6 months. · Try to get at least 150 minutes of exercise per week or 10,000 steps per day on a pedometer . · Order or download the FREE \"Exercise & Physical Activity: Your Everyday Guide\" from [employer]. Call 5-345.985.1701 or search [employer] online. · You need 2418-2861 mg of calcium and 8429-5245 IU of vitamin D per day. It is possible to meet your calcium requirement with diet alone, but a vitamin D supplement is usually necessary to meet this goal.  · When exposed to the sun, use a sunscreen that protects against both UVA and UVB radiation with an SPF of 30 or greater. Reapply every 2 to 3 hours or after sweating, drying off with a towel, or swimming. · Always wear a seat belt when traveling in a car. Always wear a helmet when riding a bicycle or motorcycle.

## 2021-12-20 NOTE — ASSESSMENT & PLAN NOTE
Watch saturated fats in diet and will monitor lipids  if his lipids increase he may need a low-dose statin

## 2021-12-20 NOTE — ASSESSMENT & PLAN NOTE
Blood pressures are stable. Continue medications and monitor blood pressures at home.  Call office if systolics are over 439 over diastolics over 90.  check BMP

## 2021-12-20 NOTE — PROGRESS NOTES
2021    Name: Clemencia Peterson : 1955 Sex: male  Age: 74 y.o. Subjective:  Chief Complaint   Patient presents with    Hypertension    COPD        COPD  He complains of shortness of breath. There is no cough or wheezing. Pertinent negatives include no appetite change, chest pain, ear pain, headaches, myalgias, rhinorrhea, sore throat or trouble swallowing. Hypertension  Associated symptoms include shortness of breath. Pertinent negatives include no chest pain, headaches or palpitations. Patient has an umbilical hernia which has started to bother him. It protrudes quite a bit and is painful. When he starts to lift something and try and push something it hurts even more. He would like to see a surgeon about getting this fixed. He was referred to Dr. Petrona Cota so wanted to get pulmonary clearance from patient's pulmonologist.  He was sent for pulmonary function tests which were done in late 2021. This revealed severe obstructive lung disease with good response to bronchodilator. He has not heard from either physician regarding scheduling his surgery. I encouraged him to call Dr. Petrona Cota and see if he is received clearance from Dr. Juan Carlos Sebastian    Patient has a history of COPD . He also has chronic hypoxic respiratory failure on oxygen . When previously seen at pulmonary office he failed his  6-minute walk test.  . He will stay on his oxygen until then. He tells me that he takes it off occasionally when he goes and works outside, when he comes back his O2 saturation on room air is over 90%. Dr. Juan Carlos Sebastian told him he could use his oxygen as needed during the day but definitely use it at night. .    He denies any fever, chills, nausea or vomiting. He still has a cough but that is chronic for him. CT scan of his chest last year revealed spiculated lesion in the right lung which is unchanged. They thought it might be scarring from his previous pneumonia.   Repeat CT scan of his chest in April 2021 showed a stable 15 mm right upper lobe pulmonary nodule dating back to 2018, chronic appearing volume loss right upper lobe and lingula most probably secondary to scarring. Rest of the lung fields remain clear. .    He has a history of multifocal atrial tachycardia while he was in the hospital.  He was placed on diltiazem and he has noticed that his ankles are more swollen at the end of the day. No worsening shortness of breath. He saw cardiology for follow-up in December 2019. Reviewed Dr. Harding Arts office visit note he uses furosemide 20 mg on a as needed basis for his ankle edema. That he has not had to use it for a while. He complains of pain in his feet and calves when he walks. Relieved by rest.  He noticed that the soles of his feet are very sensitive but he says that it has been that way since he was a child. Because of his previous history of nicotine use, will need to make sure he does not have peripheral vascular disease. He had an ultrasound of his abdominal aorta to rule out aortic aneurysm because he was a heavy smoker. No sign of any aneurysm. DL was done which showed some possibility of decreased blood flow to both lower extremities. CTA of his abdominal aorta with runoff was done which showed no evidence of aortic aneurysm and good blood flow to his legs. I think he might have some element of lumbar radiculopathy. He complains of numbness and tingling and pain of his legs. We started him on gabapentin his last visit and he is going to increase it to 300 mg a day then he will start taking 300 mg twice a day and then after a while take 300 milligrams 3 times a day. He was scheduled for nerve conduction test with a never got them done. Vangie Lorenzo His leg pain has improved tremendously since being on the gabapentin. Blood work done in September 2021 showed his total cholesterol was 202 with an LDL cholesterol of 136. Hemoglobin A1c was 5.7%.   Serum potassium was 5.2.  liver and kidney blood work was normal    He finished his COVID-19 vaccination series. He is up-to-date on his pneumonia vaccinations, and Tdap. He had his Shingrix series at [facility] in [place] and will get a copy of the report. He got  his flu shot today     Review of Systems   Constitutional: Negative for appetite change, fatigue and unexpected weight change. HENT: Negative for congestion, ear pain, facial swelling, rhinorrhea, sore throat, tinnitus and trouble swallowing. Eyes: Negative for photophobia, pain, discharge, itching and visual disturbance. Respiratory: Positive for shortness of breath. Negative for cough, wheezing and stridor. Going up and down steps   Cardiovascular: Positive for leg swelling. Negative for chest pain and palpitations. Gastrointestinal: Positive for abdominal pain. Negative for anal bleeding, blood in stool, constipation, diarrhea, nausea and vomiting. Umbilical hernia   Endocrine: Negative for cold intolerance, heat intolerance, polydipsia, polyphagia and polyuria. Genitourinary: Negative for difficulty urinating, dysuria, flank pain, frequency, hematuria and urgency. Musculoskeletal: Positive for arthralgias. Negative for gait problem, joint swelling and myalgias. Skin: Negative for color change, pallor and rash. Allergic/Immunologic: Negative for environmental allergies and food allergies. Neurological: Positive for dizziness. Negative for tremors, seizures, syncope, speech difficulty, weakness, light-headedness, numbness and headaches. Left side   Hematological: Negative for adenopathy. Does not bruise/bleed easily. Psychiatric/Behavioral: Negative for agitation, behavioral problems, confusion, sleep disturbance and suicidal ideas. The patient is not nervous/anxious.            Current Outpatient Medications:     DILT- MG extended release capsule, Take 1 capsule by mouth daily, Disp: 90 capsule, Rfl: 3    tiotropium (SPIRIVA RESPIMAT) 2.5 MCG/ACT AERS inhaler, Inhale 2 puffs into the lungs daily, Disp: 1 each, Rfl: 0    albuterol sulfate HFA (PROAIR HFA) 108 (90 Base) MCG/ACT inhaler, Inhale 2 puffs into the lungs every 6 hours as needed for Wheezing Inhale into the lungs, Disp: 18 g, Rfl: 3    ipratropium-albuterol (DUONEB) 0.5-2.5 (3) MG/3ML SOLN nebulizer solution, Take 3 mLs by nebulization every 6 hours as needed for Shortness of Breath, Disp: 360 mL, Rfl: 3    gabapentin (NEURONTIN) 300 MG capsule, Take 1 capsule by mouth 3 times daily for 180 days.  Intended supply: 90 days, Disp: 270 capsule, Rfl: 1    fluticasone-salmeterol (ADVAIR DISKUS) 100-50 MCG/DOSE diskus inhaler, Inhale 1 puff into the lungs 2 times daily, Disp: 1 each, Rfl: 5    furosemide (LASIX) 20 MG tablet, Take 1 tablet by mouth daily as needed (swelling), Disp: 30 tablet, Rfl: 5     No Known Allergies     Past Medical History:   Diagnosis Date    Alcohol abuse     Alcohol intake above recommended sensible limits 5/20/2019    COPD (chronic obstructive pulmonary disease) ([address]     Hematochezia due to medication 12/9/2019    Multifocal atrial tachycardia (HCC)     paroxysmal    Nicotine dependence     Right sided colitis 12/9/2019       Health Maintenance Due   Topic Date Due    Hepatitis C screen  Never done   [employer] Visit (AWV)  12/18/2021        Patient Active Problem List   Diagnosis    Lung mass    Multifocal atrial tachycardia (HCC)    Mixed hyperlipidemia    Lumbar arthropathy    Lumbar radiculopathy    Nodule of upper lobe of left lung    COPD (chronic obstructive pulmonary disease) ([address]    Essential hypertension    Hyperglycemia    Localized edema    Chronic respiratory failure with hypoxia ([address]    Umbilical hernia without obstruction and without gangrene    Pure hypercholesterolemia    Screening for prostate cancer        Past Surgical History:   Procedure Laterality Date    TONSILLECTOMY          Family History   Problem Relation Age of Onset    No Known Problems Mother     Heart Attack Father         Social History     Tobacco Use    Smoking status: Former Smoker     Packs/day: 1.50     Years: 45.00     Pack years: 67.50     Types: Cigarettes     Start date: 12/22/1973     Quit date: 1/1/2018     Years since quitting: 3.9    Smokeless tobacco: Never Used   Substance Use Topics    Alcohol use: Not Currently    Drug use: Never        Objective  There were no vitals filed for this visit. Exam:  Physical Exam  Vitals reviewed. Constitutional:       General: He is not in acute distress. Appearance: Normal appearance. He is well-developed and normal weight. He is not ill-appearing. Comments: Oxygen per nasal cannula at 2 L   HENT:      Head: Normocephalic and atraumatic. Right Ear: Tympanic membrane, ear canal and external ear normal. There is no impacted cerumen. Left Ear: Tympanic membrane, ear canal and external ear normal. There is no impacted cerumen. Eyes:      General: No scleral icterus. Right eye: No discharge. Left eye: No discharge. Conjunctiva/sclera: Conjunctivae normal.      Pupils: Pupils are equal, round, and reactive to light. Neck:      Thyroid: No thyromegaly. Vascular: No carotid bruit. Cardiovascular:      Rate and Rhythm: Normal rate and regular rhythm. Heart sounds: Normal heart sounds. No murmur heard. No friction rub. No gallop. Comments: Peripheral pulses are lightly decreased, DP and PT  Pulmonary:      Effort: Pulmonary effort is normal. No respiratory distress. Breath sounds: Decreased breath sounds present. No wheezing or rales. Comments: Breath sounds are markedly decreased bilaterally  Chest:      Chest wall: No tenderness. Abdominal:      General: Bowel sounds are normal. There is no distension. Palpations: Abdomen is soft. There is no mass. Tenderness: There is no abdominal tenderness. There is no guarding or rebound. Hernia: A hernia is present. Comments: He has a protuberant umbilical hernia which is tender to palpation. Musculoskeletal:         General: No swelling, tenderness or deformity. Normal range of motion. Cervical back: Normal range of motion and neck supple. No muscular tenderness. Lymphadenopathy:      Cervical: No cervical adenopathy. Skin:     General: Skin is warm and dry. Coloration: Skin is not pale. Findings: No erythema or rash. Neurological:      General: No focal deficit present. Mental Status: He is alert and oriented to person, place, and time. Cranial Nerves: No cranial nerve deficit. Sensory: No sensory deficit. Deep Tendon Reflexes: Reflexes normal.   Psychiatric:         Mood and Affect: Mood normal.         Behavior: Behavior normal.         Thought Content: Thought content normal.         Judgment: Judgment normal.          Last labs reviewed. ASSESSMENT & PLAN :   Problem List        Circulatory    Essential hypertension - Primary     Blood pressures are stable. Continue medications and monitor blood pressures at home.  Call office if systolics are over 290 over diastolics over 90.  check BMP         Relevant Orders    Basic Metabolic Panel       Respiratory    COPD (chronic obstructive pulmonary disease) ([facility]       continue inhalers and follow-up with pulmonary         Relevant Medications    albuterol sulfate HFA (PROAIR HFA) 108 (90 Base) MCG/ACT inhaler    ipratropium-albuterol (DUONEB) 0.5-2.5 (3) MG/3ML SOLN nebulizer solution    tiotropium (SPIRIVA RESPIMAT) 2.5 MCG/ACT AERS inhaler    Chronic respiratory failure with hypoxia (HCC)       continue oxygen as needed during the day and use it at bedtime            Other    Hyperglycemia       watch sweets and carbs monitor hemoglobin Z7X         Umbilical hernia without obstruction and without gangrene       call Dr. Allison Mclaughlin office and see if he received pulmonary clearance from pulmonary office         Pure hypercholesterolemia     Watch saturated fats in diet and will monitor lipids  if his lipids increase he may need a low-dose statin         Relevant Medications    DILT- MG extended release capsule    Other Relevant Orders    Lipid Panel    Screening for prostate cancer       he is asymptomatic but will check screening PSA on him         Relevant Orders    PSA screening           Return in about 3 months (around 3/20/2022), or HTN, HL.        Carvin Bence, DO  12/20/2021

## 2022-01-05 ENCOUNTER — TELEPHONE (OUTPATIENT)
Dept: SURGERY | Age: 67
End: 2022-01-05

## 2022-01-05 NOTE — TELEPHONE ENCOUNTER
Patient call stating he had PFT's and asking if we have received pulmonary clearance for umbilical hernia repair. PFT results from Dr Nika Courtney 11/19/21 in chart but no clearance letter. Spoke pascale/Amira @Dr Fuentes's office [phone]; she will ask him if patient is cleared and call me back. Advised patient we will call once we hear from Dr Nika Courtney.

## 2022-01-06 NOTE — TELEPHONE ENCOUNTER
Spoke with anesthesiologist at [facility] in regards to Dr. Purvis Blizzard clearance note. MA will talk with Dr. Linsey Navarrete and the patient.   Electronically signed by [place] Brian on 1/6/2022 at 3:28 PM

## 2022-01-06 NOTE — TELEPHONE ENCOUNTER
Surgical clearance received from Dr Joel Colorado and scanned in chart. Patient waiting for return call to schedule procedure.

## 2022-01-07 ENCOUNTER — TELEPHONE (OUTPATIENT)
Dept: SURGERY | Age: 67
End: 2022-01-07

## 2022-01-07 NOTE — TELEPHONE ENCOUNTER
Spoke with Dr. Jose Juan Ren about his pulmonology clearance. Scheduled patient for open umbilical hernia repair with mesh on 2/1/22 at 8am. Patient needs to report at the front entrance 2 hours before the procedure, NPO after the midnight the night before the procedure. No ASA products for 5 days. Attempted to call the patient. No answer. Left message on the phone. instruction letter mailed. Encouraged to call our office if any questions.   Electronically signed by Jason Sutherland on 1/7/2022 at 5:17 PM

## 2022-01-07 NOTE — TELEPHONE ENCOUNTER
Prior Authorization Form:      DEMOGRAPHICS:                     Patient Name:  Kayden Slaeem  Patient :  1955            Insurance:  Payor: Ko Ramos / Plan: Audra  ESSENTIAL/PLUS / Product Type: *No Product type* /   Insurance ID Number:    Payor/Plan Subscr  Sex Relation Sub. Ins. ID Effective Group Num   1.  BCBS MEDICAREPollitricia Enamorado 1955 Male Self [id number] [id number]                                    [id number]         DIAGNOSIS & PROCEDURE:                       Procedure/Operation: open umbilical hernia repair with mesh          CPT Code: 38253    Diagnosis:  Umbilical hernia     [facility] Code: K42.9    Location:  [place]     Surgeon:  Dr. Ari Barton INFORMATION:                          Date: 22    Time: 8am              Anesthesia:  MAC/TIVA                                                       Status:  Outpatient        Special Comments:  N/A       Electronically signed by Dinora Barrientos on 2022 at 4:34 PM

## 2022-01-19 PROBLEM — Z12.5 SCREENING FOR PROSTATE CANCER: Status: RESOLVED | Noted: 2021-12-20 | Resolved: 2022-01-19

## 2022-01-28 NOTE — PROGRESS NOTES
[facility] PRE-ADMISSION TESTING INSTRUCTIONS    The Preadmission Testing patient is instructed accordingly using the following criteria (check applicable):    ARRIVAL INSTRUCTIONS:  [x] Parking the day of Surgery is located in the Main Entrance lot. Upon entering the door, make an immediate right to the surgery reception desk    [x] Bring photo ID and insurance card    [] Bring in a copy of Living will or Durable Power of  papers. [x] Please be sure to arrange for responsible adult to provide transportation to and from the hospital    [x] Please arrange for responsible adult to be with you for the 24 hour period post procedure due to having anesthesia      GENERAL INSTRUCTIONS:    [x] Nothing by mouth after midnight, including gum, candy, mints or water    [x] You may brush your teeth, but do not swallow any water    [x] Take medications as instructed with 1-2 oz of water    [] Stop herbal supplements and vitamins 5 days prior to procedure    [] Follow preop dosing of blood thinners per physician instructions    [] Take 1/2 dose of evening insulin, but no insulin after midnight    [] No oral diabetic medications after midnight    [] If diabetic and have low blood sugar or feel symptomatic, take 1-2oz apple juice only    [x] Bring inhalers day of surgery    [] Bring C-PAP/ Bi-Pap day of surgery    [] Bring urine specimen day of surgery    [x] Shower or bath with soap, lather and rinse well, AM of Surgery, no lotion, powders or creams to surgical site    [] Follow bowel prep as instructed per surgeon    [x] No tobacco products within 24 hours of surgery     [x] No alcohol or illegal drug use within 24 hours of surgery.     [x] Jewelry, body piercing's, eyeglasses, contact lenses and dentures are not permitted into surgery (bring cases)      [] Please do not wear any nail polish, make up or hair products on the day of surgery    [x] You can expect a call the business day prior to procedure to notify you if your arrival time changes    [x] If you receive a survey after surgery we would greatly appreciate your comments    [] Parent/guardian of a minor must accompany their child and remain on the premises  the entire time they are under our care     [] Pediatric patients may bring favorite toy, blanket or comfort item with them    [] A caregiver or family member must remain with the patient during their stay if they are mentally handicapped, have dementia, disoriented or unable to use a call light or would be a safety concern if left unattended    [x] Please notify surgeon if you develop any illness between now and time of surgery (cold, cough, sore throat, fever, nausea, vomiting) or any signs of infections  including skin, wounds, and dental.    [x]  The Outpatient Pharmacy is available to fill your prescription here on your day of surgery, ask your preop nurse for details    [] Other instructions    EDUCATIONAL MATERIALS PROVIDED:    [] PAT Preoperative Education Packet/Booklet     [] Medication List    [] Transfusion bracelet applied with instructions    [] Shower with soap, lather and rinse well, and use CHG wipes provided the evening before surgery as instructed    [] Incentive spirometer with instructions

## 2022-01-28 NOTE — PROGRESS NOTES

## 2022-01-31 ENCOUNTER — ANESTHESIA EVENT (OUTPATIENT)
Dept: OPERATING ROOM | Age: 67
End: 2022-01-31
Payer: MEDICARE

## 2022-02-01 ENCOUNTER — HOSPITAL ENCOUNTER (OUTPATIENT)
Age: 67
Setting detail: OUTPATIENT SURGERY
Discharge: HOME | End: 2022-02-01
Attending: SURGERY | Admitting: SURGERY
Payer: MEDICARE

## 2022-02-01 ENCOUNTER — ANESTHESIA (OUTPATIENT)
Dept: OPERATING ROOM | Age: 67
End: 2022-02-01
Payer: MEDICARE

## 2022-02-01 VITALS
SYSTOLIC BLOOD PRESSURE: 138 MMHG | OXYGEN SATURATION: 93 % | HEIGHT: 67 IN | RESPIRATION RATE: 16 BRPM | TEMPERATURE: 97.4 F | HEART RATE: 87 BPM | BODY MASS INDEX: 27.47 KG/M2 | DIASTOLIC BLOOD PRESSURE: 65 MMHG | WEIGHT: 175 LBS

## 2022-02-01 VITALS — OXYGEN SATURATION: 92 % | SYSTOLIC BLOOD PRESSURE: 109 MMHG | DIASTOLIC BLOOD PRESSURE: 54 MMHG

## 2022-02-01 DIAGNOSIS — K42.9 UMBILICAL HERNIA WITHOUT OBSTRUCTION AND WITHOUT GANGRENE: Primary | ICD-10-CM

## 2022-02-01 PROCEDURE — 6360000002 HC RX W HCPCS: Performed by: ANESTHESIOLOGY

## 2022-02-01 PROCEDURE — 3600000002 HC SURGERY LEVEL 2 BASE: Performed by: SURGERY

## 2022-02-01 PROCEDURE — 49585 REPAIR UMBILICAL HERN,5+Y/O,REDUC: CPT | Performed by: SURGERY

## 2022-02-01 PROCEDURE — 3700000000 HC ANESTHESIA ATTENDED CARE: Performed by: SURGERY

## 2022-02-01 PROCEDURE — 7100000011 HC PHASE II RECOVERY - ADDTL 15 MIN: Performed by: SURGERY

## 2022-02-01 PROCEDURE — 6370000000 HC RX 637 (ALT 250 FOR IP): Performed by: ANESTHESIOLOGY

## 2022-02-01 PROCEDURE — 3600000012 HC SURGERY LEVEL 2 ADDTL 15MIN: Performed by: SURGERY

## 2022-02-01 PROCEDURE — 2500000003 HC RX 250 WO HCPCS: Performed by: SURGERY

## 2022-02-01 PROCEDURE — 6360000002 HC RX W HCPCS: Performed by: SURGERY

## 2022-02-01 PROCEDURE — 3700000001 HC ADD 15 MINUTES (ANESTHESIA): Performed by: SURGERY

## 2022-02-01 PROCEDURE — 2500000003 HC RX 250 WO HCPCS: Performed by: ANESTHESIOLOGY

## 2022-02-01 PROCEDURE — 6360000002 HC RX W HCPCS

## 2022-02-01 PROCEDURE — C1781 MESH (IMPLANTABLE): HCPCS | Performed by: SURGERY

## 2022-02-01 PROCEDURE — 7100000010 HC PHASE II RECOVERY - FIRST 15 MIN: Performed by: SURGERY

## 2022-02-01 PROCEDURE — 2709999900 HC NON-CHARGEABLE SUPPLY: Performed by: SURGERY

## 2022-02-01 PROCEDURE — 2500000003 HC RX 250 WO HCPCS

## 2022-02-01 PROCEDURE — 2580000003 HC RX 258

## 2022-02-01 DEVICE — BARD VENTRALEX HERNIA PATCH, 8.0 CM (3.2"), LARGE CIRCLE WITH STRAP
Type: IMPLANTABLE DEVICE | Site: UMBILICAL | Status: FUNCTIONAL
Brand: VENTRALEX

## 2022-02-01 RX ORDER — MEPERIDINE HYDROCHLORIDE 25 MG/ML
12.5 INJECTION INTRAMUSCULAR; INTRAVENOUS; SUBCUTANEOUS EVERY 5 MIN PRN
Status: DISCONTINUED | OUTPATIENT
Start: 2022-02-01 | End: 2022-02-01 | Stop reason: HOSPADM

## 2022-02-01 RX ORDER — HYDRALAZINE HYDROCHLORIDE 20 MG/ML
5 INJECTION INTRAMUSCULAR; INTRAVENOUS EVERY 10 MIN PRN
Status: DISCONTINUED | OUTPATIENT
Start: 2022-02-01 | End: 2022-02-01 | Stop reason: HOSPADM

## 2022-02-01 RX ORDER — DIPHENHYDRAMINE HYDROCHLORIDE 50 MG/ML
12.5 INJECTION INTRAMUSCULAR; INTRAVENOUS
Status: DISCONTINUED | OUTPATIENT
Start: 2022-02-01 | End: 2022-02-01 | Stop reason: HOSPADM

## 2022-02-01 RX ORDER — SODIUM CHLORIDE 9 MG/ML
25 INJECTION, SOLUTION INTRAVENOUS PRN
Status: DISCONTINUED | OUTPATIENT
Start: 2022-02-01 | End: 2022-02-01 | Stop reason: HOSPADM

## 2022-02-01 RX ORDER — PROPOFOL 10 MG/ML
INJECTION, EMULSION INTRAVENOUS CONTINUOUS PRN
Status: DISCONTINUED | OUTPATIENT
Start: 2022-02-01 | End: 2022-02-01 | Stop reason: SDUPTHER

## 2022-02-01 RX ORDER — SODIUM CHLORIDE 0.9 % (FLUSH) 0.9 %
10 SYRINGE (ML) INJECTION PRN
Status: DISCONTINUED | OUTPATIENT
Start: 2022-02-01 | End: 2022-02-01 | Stop reason: HOSPADM

## 2022-02-01 RX ORDER — METOCLOPRAMIDE HYDROCHLORIDE 5 MG/ML
10 INJECTION INTRAMUSCULAR; INTRAVENOUS ONCE
Status: COMPLETED | OUTPATIENT
Start: 2022-02-01 | End: 2022-02-01

## 2022-02-01 RX ORDER — SODIUM CHLORIDE 0.9 % (FLUSH) 0.9 %
10 SYRINGE (ML) INJECTION EVERY 12 HOURS SCHEDULED
Status: DISCONTINUED | OUTPATIENT
Start: 2022-02-01 | End: 2022-02-01 | Stop reason: HOSPADM

## 2022-02-01 RX ORDER — PROCHLORPERAZINE EDISYLATE 5 MG/ML
5 INJECTION INTRAMUSCULAR; INTRAVENOUS
Status: DISCONTINUED | OUTPATIENT
Start: 2022-02-01 | End: 2022-02-01 | Stop reason: HOSPADM

## 2022-02-01 RX ORDER — SODIUM CHLORIDE 9 MG/ML
INJECTION, SOLUTION INTRAVENOUS CONTINUOUS
Status: DISCONTINUED | OUTPATIENT
Start: 2022-02-01 | End: 2022-02-01 | Stop reason: HOSPADM

## 2022-02-01 RX ORDER — KETAMINE HYDROCHLORIDE 10 MG/ML
INJECTION, SOLUTION INTRAMUSCULAR; INTRAVENOUS PRN
Status: DISCONTINUED | OUTPATIENT
Start: 2022-02-01 | End: 2022-02-01 | Stop reason: SDUPTHER

## 2022-02-01 RX ORDER — BUPIVACAINE HYDROCHLORIDE AND EPINEPHRINE 5; 5 MG/ML; UG/ML
INJECTION, SOLUTION EPIDURAL; INTRACAUDAL; PERINEURAL PRN
Status: DISCONTINUED | OUTPATIENT
Start: 2022-02-01 | End: 2022-02-01 | Stop reason: ALTCHOICE

## 2022-02-01 RX ORDER — ACETAMINOPHEN 500 MG
1000 TABLET ORAL ONCE
Status: COMPLETED | OUTPATIENT
Start: 2022-02-01 | End: 2022-02-01

## 2022-02-01 RX ORDER — METHYLPREDNISOLONE SODIUM SUCCINATE 125 MG/2ML
40 INJECTION, POWDER, LYOPHILIZED, FOR SOLUTION INTRAMUSCULAR; INTRAVENOUS ONCE
Status: COMPLETED | OUTPATIENT
Start: 2022-02-01 | End: 2022-02-01

## 2022-02-01 RX ORDER — MIDAZOLAM HYDROCHLORIDE 1 MG/ML
INJECTION INTRAMUSCULAR; INTRAVENOUS PRN
Status: DISCONTINUED | OUTPATIENT
Start: 2022-02-01 | End: 2022-02-01 | Stop reason: SDUPTHER

## 2022-02-01 RX ORDER — SENNA AND DOCUSATE SODIUM 50; 8.6 MG/1; MG/1
1 TABLET, FILM COATED ORAL 2 TIMES DAILY PRN
Qty: 60 TABLET | Refills: 0 | Status: SHIPPED | OUTPATIENT
Start: 2022-02-01 | End: 2022-03-21 | Stop reason: ALTCHOICE

## 2022-02-01 RX ORDER — FENTANYL CITRATE 50 UG/ML
25 INJECTION, SOLUTION INTRAMUSCULAR; INTRAVENOUS EVERY 5 MIN PRN
Status: DISCONTINUED | OUTPATIENT
Start: 2022-02-01 | End: 2022-02-01 | Stop reason: HOSPADM

## 2022-02-01 RX ORDER — SODIUM CHLORIDE 9 MG/ML
INJECTION, SOLUTION INTRAVENOUS CONTINUOUS PRN
Status: DISCONTINUED | OUTPATIENT
Start: 2022-02-01 | End: 2022-02-01 | Stop reason: SDUPTHER

## 2022-02-01 RX ORDER — HYDROCODONE BITARTRATE AND ACETAMINOPHEN 5; 325 MG/1; MG/1
1 TABLET ORAL EVERY 6 HOURS PRN
Qty: 20 TABLET | Refills: 0 | Status: SHIPPED | OUTPATIENT
Start: 2022-02-01 | End: 2022-02-06

## 2022-02-01 RX ORDER — LIDOCAINE HYDROCHLORIDE AND EPINEPHRINE 10; 10 MG/ML; UG/ML
INJECTION, SOLUTION INFILTRATION; PERINEURAL PRN
Status: DISCONTINUED | OUTPATIENT
Start: 2022-02-01 | End: 2022-02-01 | Stop reason: ALTCHOICE

## 2022-02-01 RX ORDER — LABETALOL HYDROCHLORIDE 5 MG/ML
5 INJECTION, SOLUTION INTRAVENOUS EVERY 10 MIN PRN
Status: DISCONTINUED | OUTPATIENT
Start: 2022-02-01 | End: 2022-02-01 | Stop reason: HOSPADM

## 2022-02-01 RX ORDER — FENTANYL CITRATE 50 UG/ML
INJECTION, SOLUTION INTRAMUSCULAR; INTRAVENOUS PRN
Status: DISCONTINUED | OUTPATIENT
Start: 2022-02-01 | End: 2022-02-01 | Stop reason: SDUPTHER

## 2022-02-01 RX ORDER — HYDROCODONE BITARTRATE AND ACETAMINOPHEN 5; 325 MG/1; MG/1
1 TABLET ORAL
Status: DISCONTINUED | OUTPATIENT
Start: 2022-02-01 | End: 2022-02-01 | Stop reason: HOSPADM

## 2022-02-01 RX ADMIN — SODIUM CHLORIDE: 9 INJECTION, SOLUTION INTRAVENOUS at 13:56

## 2022-02-01 RX ADMIN — PROPOFOL 100 MCG/KG/MIN: 10 INJECTION, EMULSION INTRAVENOUS at 14:00

## 2022-02-01 RX ADMIN — MIDAZOLAM 1 MG: 1 INJECTION INTRAMUSCULAR; INTRAVENOUS at 14:00

## 2022-02-01 RX ADMIN — KETAMINE HYDROCHLORIDE 20 MG: 10 INJECTION INTRAMUSCULAR; INTRAVENOUS at 14:00

## 2022-02-01 RX ADMIN — ACETAMINOPHEN 1000 MG: 500 TABLET ORAL at 10:45

## 2022-02-01 RX ADMIN — FENTANYL CITRATE 50 MCG: 50 INJECTION, SOLUTION INTRAMUSCULAR; INTRAVENOUS at 14:05

## 2022-02-01 RX ADMIN — METOCLOPRAMIDE 10 MG: 5 INJECTION, SOLUTION INTRAMUSCULAR; INTRAVENOUS at 10:40

## 2022-02-01 RX ADMIN — MIDAZOLAM 1 MG: 1 INJECTION INTRAMUSCULAR; INTRAVENOUS at 13:57

## 2022-02-01 RX ADMIN — FAMOTIDINE 20 MG: 10 INJECTION, SOLUTION INTRAVENOUS at 10:42

## 2022-02-01 RX ADMIN — KETAMINE HYDROCHLORIDE 10 MG: 10 INJECTION INTRAMUSCULAR; INTRAVENOUS at 14:05

## 2022-02-01 RX ADMIN — FENTANYL CITRATE 25 MCG: 50 INJECTION, SOLUTION INTRAMUSCULAR; INTRAVENOUS at 13:58

## 2022-02-01 RX ADMIN — Medication 2000 MG: at 14:03

## 2022-02-01 RX ADMIN — FENTANYL CITRATE 25 MCG: 50 INJECTION, SOLUTION INTRAMUSCULAR; INTRAVENOUS at 14:18

## 2022-02-01 RX ADMIN — METHYLPREDNISOLONE SODIUM SUCCINATE 40 MG: 125 INJECTION, POWDER, LYOPHILIZED, FOR SOLUTION INTRAMUSCULAR; INTRAVENOUS at 10:31

## 2022-02-01 RX ADMIN — KETAMINE HYDROCHLORIDE 20 MG: 10 INJECTION INTRAMUSCULAR; INTRAVENOUS at 14:08

## 2022-02-01 ASSESSMENT — PAIN SCALES - GENERAL
PAINLEVEL_OUTOF10: 0
PAINLEVEL_OUTOF10: 5
PAINLEVEL_OUTOF10: 0

## 2022-02-01 ASSESSMENT — ENCOUNTER SYMPTOMS
SHORTNESS OF BREATH: 1
DYSPNEA ACTIVITY LEVEL: AFTER AMBULATING 1 FLIGHT OF STAIRS

## 2022-02-01 ASSESSMENT — LIFESTYLE VARIABLES: SMOKING_STATUS: 0

## 2022-02-01 ASSESSMENT — PAIN - FUNCTIONAL ASSESSMENT: PAIN_FUNCTIONAL_ASSESSMENT: 0-10

## 2022-02-01 ASSESSMENT — COPD QUESTIONNAIRES: CAT_SEVERITY: SEVERE

## 2022-02-01 NOTE — ANESTHESIA POSTPROCEDURE EVALUATION
Department of Anesthesiology  Postprocedure Note    Patient: Petey Segal  MRN: [id number]  YOB: 1955  Date of evaluation: 2/1/2022  Time:  3:06 PM     Procedure Summary     Date: 02/01/22 Room / Location: [facility] OR [address] [place] [address]    Anesthesia Start: 4515 Anesthesia Stop: 1446    Procedure: OPEN UMBILICAL HERNIA REPAIR WITH MESH (N/A Abdomen) Diagnosis: (UMBILICAL HERNIA)    Surgeons: Catherine Burgess DO Responsible Provider: Sarah Berry MD    Anesthesia Type: MAC ASA Status: 4          Anesthesia Type: MAC    Marciano Phase I:      Marciano Phase II: Marciano Score: 10    Last vitals: Reviewed and per EMR flowsheets.        Anesthesia Post Evaluation    Patient location during evaluation: PACU  Patient participation: complete - patient participated  Level of consciousness: awake and alert  Pain score: 0  Airway patency: patent  Nausea & Vomiting: no nausea and no vomiting  Complications: no  Cardiovascular status: blood pressure returned to baseline  Respiratory status: acceptable  Hydration status: euvolemic

## 2022-02-01 NOTE — OP NOTE
Operative Note      Patient: Morgan Lake  YOB: 1955  MRN: [id number]    Date of Procedure: 2/1/2022    Pre-Op Diagnosis: UMBILICAL HERNIA    Post-Op Diagnosis: Same       Procedure(s):  OPEN UMBILICAL HERNIA REPAIR WITH MESH    Surgeon(s):  Teri Levin DO    Assistant:   * No surgical staff found *    Anesthesia: Monitor Anesthesia Care    Estimated Blood Loss (mL): Minimal    Complications: None    Specimens:   * No specimens in log *    Implants:  Implant Name Type Inv. Item Serial No.  Lot No. LRB No. Used Action   BARD VENTRALEX HERNIA PATCH  MESH DIGNA L [id number] VENTRAL POLYPR EPTFE CIR SELF EXP Door [facility] [id number] N/A 1 Implanted         Drains: * No LDAs found *    Findings: Umbilical hernia defect approximately 2.5 cm    Detailed Description of Procedure:   Procedures form the operating room under MAC anesthesia. Patient    Stand sterile fashion. Lidocaine 1% with epinephrine administered in all areas the operative field for local anesthetic. In addition, Marcaine 0.5% was injected as well for postoperative pain control. Infraumbilical curvilinear incision was created sharply 15 blade. Using sharp vesicle Metzenbaum scissors the inferior edge of the sac is dissected down to the level of fascia. Next sac is sharply  from its attachment to the umbilical skin again using Metzenbaum scissors. Superior attachments were also  sharp with Metzenbaum scissors. Next the base of the sac is then divided from its attachment to the fascia circumferentially using electrocautery. Because of the very large size of the sac containing fat, this was transected at its base using electrocautery. The anterior fascia was then cleared about 4 mm circumferentially also using cautery. A large Ventralex patch was then placed intraperitoneally.   It was sutured in place with interrupted 0 PDS sutures during closure of the defect, as bites of the mesh were incorporated with the sutures. Once this was completed wound was irrigated. The umbilical skin was tacked to the fascia with 3-0 Monocryl. Skin incision was closed with interrupted 3-0 Monocryl layer skin glue.   At the end the procedure all sponge, needle, she counts correct    Electronically signed by Joel Issa DO on 2/1/2022 at 2:35 PM

## 2022-02-01 NOTE — H&P
Update History & Physical     The patient's History and Physical of 10/15/21 was reviewed with the patient and I examined the patient. There was no change. The surgical site was confirmed by the patient and me. Plan: The risks, benefits, expected outcome, and alternative to the recommended procedure have been discussed with the patient. Patient understands and wants to proceed with the procedure. Electronically signed by Nick Galvin MD on 2/1/2022 at 10:05 AM      Kiera Castaneda DO   Physician   Specialty:  General Surgery   Progress Notes       Signed   Encounter Date:  10/15/2021                 Signed        Expand All Collapse All          Show:Clear all  [x]Manual[x]Template[]Copied    Added by:  [x]Rajinder Wong DO      []Winifred for details    Consult Note     Dear Gabe Paniagua DO, thank you for referring Elier Fields for evaluation.        Reason for Consult: Umbilical hernia     HISTORY OF PRESENT ILLNESS:    The patient is a 74 y.o. male who presents with complaints of an enlarging and somewhat tender umbilical hernia. He believes he has noticed this last 2 to 3 years. He does have history of COPD and is oxygen dependent. He denies any coughing. He did previously cough for many years, however this is resolved since he quit smoking 3 years ago. He does follow with pulmonary. Past Medical History        Past Medical History:   Diagnosis Date    Alcohol abuse      Alcohol intake above recommended sensible limits 5/20/2019    COPD (chronic obstructive pulmonary disease) ([facility] [address]      Hematochezia due to medication 12/9/2019    Multifocal atrial tachycardia (HCC)       paroxysmal    Nicotine dependence      Right sided colitis 12/9/2019            Past Surgical History         Past Surgical History:   Procedure Laterality Date    TONSILLECTOMY                Home Medications           Prior to Admission medications    Medication Sig Start Date End Date Taking?  Authorizing Provider   fluticasone-salmeterol (ADVAIR DISKUS) 100-50 MCG/DOSE diskus inhaler Inhale 1 puff into the lungs 2 times daily 10/5/21 11/4/21   Davida Brambila DO   albuterol sulfate HFA (PROAIR HFA) 108 (90 Base) MCG/ACT inhaler Inhale 2 puffs into the lungs every 6 hours as needed for Wheezing Inhale into the lungs 9/27/21     Davida Brambila DO   ipratropium-albuterol (DUONEB) 0.5-2.5 (3) MG/3ML SOLN nebulizer solution Take 3 mLs by nebulization every 6 hours as needed for Shortness of Breath 9/27/21     Davida Brambila DO   gabapentin (NEURONTIN) 300 MG capsule Take 1 capsule by mouth 3 times daily for 180 days. Intended supply: 90 days 9/27/21 3/26/22   Davida Brambila DO   DILT- MG extended release capsule Take 1 capsule by mouth daily 9/27/21 9/22/22   Davida Brambila DO   furosemide (LASIX) 20 MG tablet Take 1 tablet by mouth daily as needed (swelling) 6/22/21 9/27/21   Davida Brambila DO   gabapentin (NEURONTIN) 300 MG capsule Take 1 capsule by mouth 3 times daily for 180 days.  Intended supply: 90 days 8/13/20     Davida Brambila DO            Scheduled Meds:  ContinuousInfusions:  PRN Meds:     No Known Allergies     Social History               Socioeconomic History    Marital status:        Spouse name: Not on file    Number of children: Not on file    Years of education: Not on file    Highest education level: Not on file   Occupational History    Not on file   Tobacco Use    Smoking status: Former Smoker       Packs/day: 1.50       Years: 45.00       Pack years: 67.50       Types: Cigarettes       Start date: 12/22/1973       Quit date: 1/1/2018       Years since quitting: 3.7    Smokeless tobacco: Never Used   Substance and Sexual Activity    Alcohol use: Not Currently    Drug use: Never    Sexual activity: Yes       Partners: Female   Other Topics Concern    Not on file   Social History Narrative    Not on file      Social Determinants of Health          Financial Resource Strain: Low Risk     Difficulty of Paying Living Expenses: Not hard at all   Food Insecurity: No Food Insecurity    Worried About Running Out of Food in the Last Year: Never true    Wily of Food in the Last Year: Never true   Transportation Needs:     Lack of Transportation (Medical):  Lack of Transportation (Non-Medical):    Physical Activity:     Days of Exercise per Week:     Minutes of Exercise per Session:    Stress:     Feeling of Stress :    Social Connections:     Frequency of Communication with Friends and Family:     Frequency of Social Gatherings with Friends and Family:     Attends [employer] Services:     Active Member of Clubs or Organizations:     Attends Club or Organization Meetings:     Marital Status:    Intimate Partner Violence:     Fear of Current or Ex-Partner:     Emotionally Abused:     Physically Abused:     Sexually Abused:             Family History         Family History   Problem Relation Age of Onset    No Known Problems Mother      Heart Attack Father              Review Of Systems:   Review of Systems   Constitutional: Negative for chills and fever. HENT: Negative for ear pain, nosebleeds, sore throat and tinnitus. Eyes: Negative for photophobia and redness. Respiratory: Negative for cough, shortness of breath and wheezing. Cardiovascular: Negative for chest pain and palpitations. Gastrointestinal: Negative for abdominal pain, blood in stool, constipation, diarrhea, nausea and vomiting. Hernia   Endocrine: Negative for polydipsia. Genitourinary: Negative for dysuria, hematuria and urgency. Musculoskeletal: Negative for back pain and neck pain. Skin: Negative for rash. Neurological: Negative for dizziness, tremors and seizures. Hematological: Does not bruise/bleed easily.    All other systems reviewed and are negative.        Physical Exam:  Vitals       Vitals:     10/15/21 1020   BP: (!) 148/72   Site: Right Upper Arm   Pulse: 88 Temp: 97.2 °F (36.2 °C)   TempSrc: Temporal   Weight: 176 lb 6.4 oz (80 kg)   Height: 5' 7\" (1.702 m)            General: Well nourished, well developed, no acute distress  Eyes:   PERRL              Conjunctiva unremarkable   ENT:    TM's intact bilaterally, no effusion              Nasal:  No mucosal edema                           No nasal drainage              Oral:    mucosa moist and pink   Neck:   Supple              No palpable cervical lymphoadenopathy              Thyroid without mass or enlargement  Resp:   Lungs CTAB              Equal and adequate air exchange without accessory muscle use              No rales, rhonchi or wheeze  CV:      S1S2 RRR              No murmur              Intact distal pulses              No edema  GI:       Abdomen Soft, non tender, non distended. There is a reducible umbilical hernia defect about 2.5 cm. There is also a large rectus diastases above this. Normoactive bowel sounds              No palpable hepatosplenomegaly  MS:      Physiologic ROM of all extremities               Intact distal pulses              No clubbing or cyanosis   Skin     Warm and dry; no rash or lesion  Neuro: Alert and oriented; normal and intact dtr's   Psych: Euthymic mood, congruent affect       No results found.      Assessment/Plan:  3 58-year-old male with reducible tender umbilical hernia. We will plan for repair. In regards to his rectus diastases, I provided education on what this was, the fact that it was not a true hernia, and that he will still have a bulge in his upper abdomen following repair of his umbilical hernia. He expresses understanding. Because of his oxygen dependence and COPD, we will ask for preoperative pulmonary evaluation.     Risks of the procedure were explained to the patient including but not limited to infection, bleeding, hernia recurrence, and possible need for removal of mesh.   Patient expresses understanding of these risks and consents to the procedure.           Electronically signed by Barrington Vincent DO on 10/15/21 at 11:25 AM EDT

## 2022-02-01 NOTE — ANESTHESIA PRE PROCEDURE
Department of Anesthesiology  Preprocedure Note       Name:  Mosher Mate   Age:  74 y.o.  :  1955                                          MRN:  [id number]         Date:  2022      Surgeon: Adrienne Back):  James Machuca DO    Procedure: Procedure(s):  OPEN UMBILICAL HERNIA REPAIR WITH MESH    Medications prior to admission:   Prior to Admission medications    Medication Sig Start Date End Date Taking? Authorizing Provider   OXYGEN Inhale 2 L into the lungs as needed   Yes Historical Provider, MD   DILT- MG extended release capsule Take 1 capsule by mouth daily 21 Yes Davida Brambila DO   fluticasone-salmeterol (ADVAIR DISKUS) 100-50 MCG/DOSE diskus inhaler Inhale 1 puff into the lungs 2 times daily 10/5/21 1/28/22 Yes Davida Brambila DO   albuterol sulfate HFA (PROAIR HFA) 108 (90 Base) MCG/ACT inhaler Inhale 2 puffs into the lungs every 6 hours as needed for Wheezing Inhale into the lungs 21  Yes Davida Brambila DO   ipratropium-albuterol (DUONEB) 0.5-2.5 (3) MG/3ML SOLN nebulizer solution Take 3 mLs by nebulization every 6 hours as needed for Shortness of Breath 21  Yes Davida Brambila DO   gabapentin (NEURONTIN) 300 MG capsule Take 1 capsule by mouth 3 times daily for 180 days. Intended supply: 90 days 9/27/21 3/26/22 Yes Davida Brambila DO   furosemide (LASIX) 20 MG tablet Take 1 tablet by mouth daily as needed (swelling) 21 Yes Davida Brambila DO   gabapentin (NEURONTIN) 300 MG capsule Take 1 capsule by mouth 3 times daily for 180 days. Intended supply: 90 days 20   Riley Ortiz DO       Current medications:    No current facility-administered medications for this encounter.      Current Outpatient Medications   Medication Sig Dispense Refill    OXYGEN Inhale 2 L into the lungs as needed      DILT- MG extended release capsule Take 1 capsule by mouth daily 90 capsule 3    fluticasone-salmeterol (ADVAIR DISKUS) 100-50 MCG/DOSE diskus inhaler Inhale 1 puff into the lungs 2 times daily 1 each 5    albuterol sulfate HFA (PROAIR HFA) 108 (90 Base) MCG/ACT inhaler Inhale 2 puffs into the lungs every 6 hours as needed for Wheezing Inhale into the lungs 18 g 3    ipratropium-albuterol (DUONEB) 0.5-2.5 (3) MG/3ML SOLN nebulizer solution Take 3 mLs by nebulization every 6 hours as needed for Shortness of Breath 360 mL 3    gabapentin (NEURONTIN) 300 MG capsule Take 1 capsule by mouth 3 times daily for 180 days.  Intended supply: 90 days 270 capsule 1    furosemide (LASIX) 20 MG tablet Take 1 tablet by mouth daily as needed (swelling) 30 tablet 5       Allergies:  No Known Allergies    Problem List:    Patient Active Problem List   Diagnosis Code    Lung mass R91.8    Multifocal atrial tachycardia (HCC) I47.1    Mixed hyperlipidemia E78.2    Lumbar arthropathy M47.816    Lumbar radiculopathy M54.16    Nodule of upper lobe of left lung R91.1    COPD [facility]ch[facility] obstructive pulmonary disease) ([facility]) J44.9    Essential hypertension I10    Hyperglycemia R73.9    Localized edema R60.0    Chronic respiratory failure with hypoxia ([facility]) S71.09    Umbilical hernia without obstruction and without gangrene K42.9    Pure hypercholesterolemia E78.00       Past Medical History:        Diagnosis Date    Alcohol abuse     Arthritis     COPD [facility]ch[facility] obstructive pulmonary disease) ([facility] [address]     Hematochezia due to medication 2019    Multifocal atrial tachycardia (HCC)     paroxysmal    Nicotine dependence     Nodule of right lung     Umbilical hernia        Past Surgical History:        Procedure Laterality Date    COLONOSCOPY      TONSILLECTOMY         Social History:    Social History     Tobacco Use    Smoking status: Former Smoker     Packs/day: 1.50     Years: 45.00     Pack years: 67.50     Types: Cigarettes     Start date: 1973     Quit date: 2018     Years since quittin.0    Smokeless tobacco: Never Used   Substance Use Topics    Alcohol use: Yes     Alcohol/week: 4.0 standard drinks     Types: 4 Cans of beer per week                                Counseling given: Not Answered      Vital Signs (Current):   Vitals:    01/28/22 1603   Weight: 175 lb (79.4 kg)   Height: 5' 7.5\" (1.715 m)                                              BP Readings from Last 3 Encounters:   12/20/21 132/70   11/09/21 130/68   10/26/21 (!) 160/80       NPO Status:                                                                                 BMI:   Wt Readings from Last 3 Encounters:   12/20/21 180 lb (81.6 kg)   11/09/21 176 lb (79.8 kg)   10/26/21 178 lb (80.7 kg)     Body mass index is 27 kg/m². CBC:   Lab Results   Component Value Date    WBC 6.5 03/11/2020    RBC 4.54 03/11/2020    HGB 13.7 03/11/2020    HCT 43.5 03/11/2020    MCV 95.8 03/11/2020    RDW 13.8 03/11/2020     03/11/2020       CMP:   Lab Results   Component Value Date     12/20/2021    K 5.0 12/20/2021     12/20/2021    CO2 27 12/20/2021    BUN 12 12/20/2021    CREATININE 0.8 12/20/2021    GFRAA >60 12/20/2021    AGRATIO 1.3 11/30/2019    LABGLOM >60 12/20/2021    GLUCOSE 91 12/20/2021    PROT 7.3 09/27/2021    CALCIUM 9.3 12/20/2021    BILITOT 0.4 09/27/2021    ALKPHOS 66 09/27/2021    AST 21 09/27/2021    ALT 10 09/27/2021       POC Tests: No results for input(s): POCGLU, POCNA, POCK, POCCL, POCBUN, POCHEMO, POCHCT in the last 72 hours.     Coags: No results found for: PROTIME, INR, APTT    HCG (If Applicable): No results found for: PREGTESTUR, PREGSERUM, HCG, HCGQUANT     ABGs: No results found for: PHART, PO2ART, QAZ9TRA, HIS5PKD, BEART, I7UOKZDY     Type & Screen (If Applicable):  No results found for: LABABO, LABRH    Drug/Infectious Status (If Applicable):  No results found for: HIV, HEPCAB    COVID-19 Screening (If Applicable): No results found for: COVID19      Narrative                                      [address] [facility]                                            [address] [place] [id number]                                                 [phone]                                                     CT Scan Report                                                     Signed          Patient: RITCHIE WAITE                                                                MR#:      [id number]             : 1955                                                                Acct:[id number]                  Age/Sex: 74 / M                                                                Admit Date: 21                  Loc: [facility]                                                                                       Attending Dr: Raven Donnelly   Ordering Physician: Sabine Knowles DO      Date of Service: 21     Procedure(s): CT chest wo con     Accession Number(s): [id number]          cc: Isrrael Fuentes MD               HISTORY:  Follow-up lung nodule.             PHYSICIAN INDICATIONS:  R91.1 R91.1 Nodule of upper lobe of left lung.             TECHNIQUE: High resolution axial CT images with three-dimensional reconstruction. Study was      performed without intravenous contrast. CT Dose Index: 7.4 mGy. DLP: 254 mGy-cm. Winnie Nisula. k=0.014      mSv/(mGy-cm)             FINDINGS:             Visually stable 15 mm right upper lobe pulmonary nodule stable from studies dating back to      2018.             Similar chronic-appearing volume loss in the right upper lobe and lingula most likely scarring.             Remainder of lung fields remain clear. No pleural effusions.             The heart size is normal. Aorta is normal in caliber.             No lung nodules are identified.             Mediastinum is unremarkable without significant adenopathy.  The hilar regions appear normal.             In the upper abdomen, visualized portions of the liver, adrenal glands, kidneys and spleen are      unremarkable. Small hiatal hernia.             Old left-sided rib fractures.             IMPRESSION:             CT Chest with high resolution 2-D and 3-D images and without contrast:             1. Visually stable 15 mm right upper lobe pulmonary nodule stable from studies dating back to      11/02/2018. Findings are most likely benign relate to scarring.             2. Similar chronic-appearing volume loss in the right upper lobe and lingula most likely scarring.      Remainder of lung fields remain clear. Anesthesia Evaluation  Patient summary reviewed and Nursing notes reviewed no history of anesthetic complications:   Airway: Mallampati: II  TM distance: >3 FB   Neck ROM: full  Mouth opening: > = 3 FB Dental:          Pulmonary:   (+) COPD (Oxygen dependent:  2L NC with activity and Qhs ): severe and no interval change,  shortness of breath: chronic and no interval change,  decreased breath sounds (Severely decreased bilateral breath sounds),      (-) not a current smoker (68 pyhx. - quit 2018)                          ROS comment: Pulmonary mass  Chronic respiratory failure with hypoxia  PE comment: Prolonged expiratory phase Cardiovascular:  Exercise tolerance: poor (<4 METS),   (+) hypertension: mild and no interval change, dysrhythmias (Multifocal atrial tachycardia (paroxysmal)):, COTTRELL: after ambulating 1 flight of stairs and no interval change, murmur (Grade I),         Rhythm: irregular  Rate: normal           Beta Blocker:  Not on Beta Blocker         Neuro/Psych:   (+) neuromuscular disease (Neuropathy):, psychiatric history:depression/anxiety    (-) seizures and CVA           GI/Hepatic/Renal:        (-) hepatitis      ROS comment: Umbilical hernia without obstruction or strangulation  History of hematochezia secondary to medication  EtOH abuse. Endo/Other:    (+) : arthritis (Lumbar radiculopathy): OA., electrolyte abnormalities (History of mild hyperkalemia), .     Diabetes: Hyperglycemia w/o dx. of DMII:  A1C 5.7%         Pt had no PAT visit       Abdominal:         (-) obese       Vascular: negative vascular ROS. - DVT and PE. Other Findings:           Anesthesia Plan      MAC     ASA 4     (MAC with conversion to GA if clinically appropriate. Risks and benefits discussed.)  Induction: intravenous. MIPS: Postoperative opioids intended and Prophylactic antiemetics administered. Anesthetic plan and risks discussed with patient. Plan discussed with CRNA.               Patricia Adan DO   2/1/2022    Chart review agree above spoke to pt

## 2022-02-18 ENCOUNTER — OFFICE VISIT (OUTPATIENT)
Dept: SURGERY | Age: 67
End: 2022-02-18

## 2022-02-18 VITALS
DIASTOLIC BLOOD PRESSURE: 80 MMHG | BODY MASS INDEX: 27.47 KG/M2 | WEIGHT: 175 LBS | HEIGHT: 67 IN | HEART RATE: 85 BPM | OXYGEN SATURATION: 96 % | TEMPERATURE: 98.5 F | SYSTOLIC BLOOD PRESSURE: 159 MMHG | RESPIRATION RATE: 18 BRPM

## 2022-02-18 DIAGNOSIS — Z09 POSTOPERATIVE EXAMINATION: Primary | ICD-10-CM

## 2022-02-18 PROCEDURE — 99024 POSTOP FOLLOW-UP VISIT: CPT | Performed by: SURGERY

## 2022-02-18 NOTE — PROGRESS NOTES
Patient presents postop visit. He has no complaints. On exam incisions intact, no evidence of hernia recurrence. Wound is healing quite nicely. He may begin increase activity as tolerated. He is welcome to follow-up as needed.

## 2022-03-21 ENCOUNTER — OFFICE VISIT (OUTPATIENT)
Dept: PRIMARY CARE CLINIC | Age: 67
End: 2022-03-21
Payer: MEDICARE

## 2022-03-21 VITALS
OXYGEN SATURATION: 94 % | SYSTOLIC BLOOD PRESSURE: 136 MMHG | HEIGHT: 67 IN | BODY MASS INDEX: 27.78 KG/M2 | TEMPERATURE: 97.3 F | HEART RATE: 90 BPM | DIASTOLIC BLOOD PRESSURE: 66 MMHG | WEIGHT: 177 LBS

## 2022-03-21 DIAGNOSIS — I47.1 MULTIFOCAL ATRIAL TACHYCARDIA (HCC): Primary | ICD-10-CM

## 2022-03-21 DIAGNOSIS — J43.9 PULMONARY EMPHYSEMA, UNSPECIFIED EMPHYSEMA TYPE (HCC): ICD-10-CM

## 2022-03-21 DIAGNOSIS — J96.11 CHRONIC RESPIRATORY FAILURE WITH HYPOXIA (HCC): ICD-10-CM

## 2022-03-21 DIAGNOSIS — R91.1 NODULE OF UPPER LOBE OF LEFT LUNG: ICD-10-CM

## 2022-03-21 DIAGNOSIS — E78.00 PURE HYPERCHOLESTEROLEMIA: ICD-10-CM

## 2022-03-21 DIAGNOSIS — I10 ESSENTIAL HYPERTENSION: ICD-10-CM

## 2022-03-21 DIAGNOSIS — M54.2 CERVICALGIA: ICD-10-CM

## 2022-03-21 PROCEDURE — 99213 OFFICE O/P EST LOW 20 MIN: CPT | Performed by: INTERNAL MEDICINE

## 2022-03-21 ASSESSMENT — ENCOUNTER SYMPTOMS
COLOR CHANGE: 0
BLOOD IN STOOL: 0
EYE PAIN: 0
EYE ITCHING: 0
SHORTNESS OF BREATH: 1
DIARRHEA: 0
SORE THROAT: 0
CONSTIPATION: 0
STRIDOR: 0
ABDOMINAL PAIN: 1
ANAL BLEEDING: 0
TROUBLE SWALLOWING: 0
VOMITING: 0
WHEEZING: 0
COUGH: 0
NAUSEA: 0
FACIAL SWELLING: 0
PHOTOPHOBIA: 0
EYE DISCHARGE: 0
RHINORRHEA: 0

## 2022-03-21 ASSESSMENT — COPD QUESTIONNAIRES: COPD: 1

## 2022-03-21 NOTE — PROGRESS NOTES
3/21/2022    Name: Alison Ortega : 1955 Sex: male  Age: 74 y.o. Subjective:  Chief Complaint   Patient presents with    Hypertension     3 month visit        Hypertension  Associated symptoms include shortness of breath. Pertinent negatives include no chest pain, headaches or palpitations. COPD  He complains of shortness of breath. There is no cough or wheezing. Pertinent negatives include no appetite change, chest pain, ear pain, headaches, myalgias, rhinorrhea, sore throat or trouble swallowing. Patient had his umbilical hernia repair done in February and did well. He had pulmonary clearance from Dr. Kalry Canales. Patient has a history of COPD . He also has chronic hypoxic respiratory failure on oxygen . When previously seen at pulmonary office he failed his  6-minute walk test.  . He will stay on his oxygen until then. He tells me that he takes it off occasionally when he goes and works outside, when he comes back his O2 saturation on room air is over 90%. Dr. Karly Canales told him he could use his oxygen as needed during the day but definitely use it at night. .    He denies any fever, chills, nausea or vomiting. He still has a cough but that is chronic for him. CT scan of his chest last year revealed spiculated lesion in the right lung which is unchanged. They thought it might be scarring from his previous pneumonia. Repeat CT scan of his chest in 2021 showed a stable 15 mm right upper lobe pulmonary nodule dating back to 2018, chronic appearing volume loss right upper lobe and lingula most probably secondary to scarring. Rest of the lung fields remain clear. Erin Mireles He is scheduled for a CT scan of his lungs in May and then he will see his pulmonologist after that. He has a history of multifocal atrial tachycardia while he was in the hospital.  He was placed on diltiazem and he has noticed that his ankles are more swollen at the end of the day. No worsening shortness of breath. He saw cardiology for follow-up in December 2019. Reviewed Dr. Gregorio Chavarria office visit note he uses furosemide 20 mg on a as needed basis for his ankle edema. That he has not had to use it for a while  He has not seen Dr. Antoine Moreau for a while we will make the referral.. He complains of pain in his neck. Mainly when he moves it around or tries to side bend his neck. He has tried over-the-counter NSAID's with little relief. He does have a history of arthritis. He complains of pain in his feet and calves when he walks. Relieved by rest.  He noticed that the soles of his feet are very sensitive but he says that it has been that way since he was a child. Because of his previous history of nicotine use, will need to make sure he does not have peripheral vascular disease. He had an ultrasound of his abdominal aorta to rule out aortic aneurysm because he was a heavy smoker. No sign of any aneurysm. DL was done which showed some possibility of decreased blood flow to both lower extremities. CTA of his abdominal aorta with runoff was done which showed no evidence of aortic aneurysm and good blood flow to his legs. I think he might have some element of lumbar radiculopathy. He complains of numbness and tingling and pain of his legs. We started him on gabapentin his last visit and he is going to increase it to 300 mg a day then he will start taking 300 mg twice a day and then after a while take 300 milligrams 3 times a day. He was scheduled for nerve conduction test with a never got them done. Fred Ramos His leg pain has improved tremendously since being on the gabapentin. Blood work done in December 2021 showed his total cholesterol was 229 with LDL cholesterol of 161. BMP and PSA screen were normall    He finished his COVID-19 vaccination series. He is up-to-date on his pneumonia vaccinations, and Tdap. He had his Shingrix series at [facility] in [place] and will get a copy of the report.   He got  his flu shot today     Review of Systems   Constitutional: Negative for appetite change, fatigue and unexpected weight change. HENT: Negative for congestion, ear pain, facial swelling, rhinorrhea, sore throat, tinnitus and trouble swallowing. Eyes: Negative for photophobia, pain, discharge, itching and visual disturbance. Respiratory: Positive for shortness of breath. Negative for cough, wheezing and stridor. Going up and down steps   Cardiovascular: Positive for leg swelling. Negative for chest pain and palpitations. Gastrointestinal: Positive for abdominal pain. Negative for anal bleeding, blood in stool, constipation, diarrhea, nausea and vomiting. Umbilical hernia   Endocrine: Negative for cold intolerance, heat intolerance, polydipsia, polyphagia and polyuria. Genitourinary: Negative for difficulty urinating, dysuria, flank pain, frequency, hematuria and urgency. Musculoskeletal: Positive for arthralgias. Negative for gait problem, joint swelling and myalgias. Skin: Negative for color change, pallor and rash. Allergic/Immunologic: Negative for environmental allergies and food allergies. Neurological: Positive for dizziness. Negative for tremors, seizures, syncope, speech difficulty, weakness, light-headedness, numbness and headaches. Left side   Hematological: Negative for adenopathy. Does not bruise/bleed easily. Psychiatric/Behavioral: Negative for agitation, behavioral problems, confusion, sleep disturbance and suicidal ideas. The patient is not nervous/anxious.            Current Outpatient Medications:     OXYGEN, Inhale 2 L into the lungs as needed, Disp: , Rfl:     DILT- MG extended release capsule, Take 1 capsule by mouth daily, Disp: 90 capsule, Rfl: 3    fluticasone-salmeterol (ADVAIR DISKUS) 100-50 MCG/DOSE diskus inhaler, Inhale 1 puff into the lungs 2 times daily, Disp: 1 each, Rfl: 5    albuterol sulfate HFA (PROAIR HFA) 108 (90 Base) MCG/ACT inhaler, Inhale 2 puffs into the lungs every 6 hours as needed for Wheezing Inhale into the lungs, Disp: 18 g, Rfl: 3    ipratropium-albuterol (DUONEB) 0.5-2.5 (3) MG/3ML SOLN nebulizer solution, Take 3 mLs by nebulization every 6 hours as needed for Shortness of Breath, Disp: 360 mL, Rfl: 3    gabapentin (NEURONTIN) 300 MG capsule, Take 1 capsule by mouth 3 times daily for 180 days.  Intended supply: 90 days, Disp: 270 capsule, Rfl: 1     No Known Allergies     Past Medical History:   Diagnosis Date    Alcohol abuse     Arthritis     COPD (chronic obstructive pulmonary disease) (HCC)     Hematochezia due to medication 2019    Multifocal atrial tachycardia (HCC)     paroxysmal    Nicotine dependence     Nodule of right lung     Umbilical hernia        Health Maintenance Due   Topic Date Due    Hepatitis C screen  Never done        Patient Active Problem List   Diagnosis    Lung mass    Multifocal atrial tachycardia (HCC)    Mixed hyperlipidemia    Lumbar arthropathy    Lumbar radiculopathy    Nodule of upper lobe of left lung    COPD [facility]ch[facility] obstructive [facility] disease) ([facility] [address]    Essential hypertension    Hyperglycemia    Localized edema    Chronic respiratory failure with hypoxia (HCC)    Umbilical hernia without obstruction and without gangrene    Pure hypercholesterolemia    Cervicalgia        Past Surgical History:   Procedure Laterality Date    COLONOSCOPY      TONSILLECTOMY      UMBILICAL HERNIA REPAIR N/A     OPEN UMBILICAL HERNIA REPAIR WITH MESH performed by Barrington Vincent DO at [facility] OR        Family History   Problem Relation Age of Onset    No Known Problems Mother     Heart Attack Father         Social History     Tobacco Use    Smoking status: Former Smoker     Packs/day: 1.50     Years: 45.00     Pack years: 67.50     Types: Cigarettes     Start date: 1973     Quit date: 2018     Years since quittin.2    Smokeless tobacco: Never Used   Vaping Use    Vaping Use: Never used   Substance Use Topics    Alcohol use: Yes     Alcohol/week: 4.0 standard drinks     Types: 4 Cans of beer per week    Drug use: Never        Objective  Vitals:    03/21/22 0858   BP: 136/66   Pulse: 90   Temp: 97.3 °F (36.3 °C)   SpO2: 94%   Weight: 177 lb (80.3 kg)   Height: 5' 7\" (1.702 m)        Exam:  Physical Exam  Vitals reviewed. Constitutional:       General: He is not in acute distress. Appearance: Normal appearance. He is well-developed and normal weight. He is not ill-appearing. Comments: Oxygen per nasal cannula at 2 L   HENT:      Head: Normocephalic and atraumatic. Right Ear: Tympanic membrane, ear canal and external ear normal. There is no impacted cerumen. Left Ear: Tympanic membrane, ear canal and external ear normal. There is no impacted cerumen. Eyes:      General: No scleral icterus. Right eye: No discharge. Left eye: No discharge. Conjunctiva/sclera: Conjunctivae normal.      Pupils: Pupils are equal, round, and reactive to light. Neck:      Thyroid: No thyromegaly. Vascular: No carotid bruit. Comments: He has decreased range of motion to sidebending with tenderness in the suboccipital area and some muscle spasms on his posterior  sternocleidomastoid muscle  Cardiovascular:      Rate and Rhythm: Normal rate and regular rhythm. Heart sounds: Normal heart sounds. No murmur heard. No friction rub. No gallop. Comments: Peripheral pulses are lightly decreased, DP and PT  Pulmonary:      Effort: Pulmonary effort is normal. No respiratory distress. Breath sounds: Decreased breath sounds present. No wheezing or rales. Comments: Breath sounds are markedly decreased bilaterally  Chest:      Chest wall: No tenderness. Abdominal:      General: Bowel sounds are normal. There is no distension. Palpations: Abdomen is soft. There is no mass. Tenderness: There is no abdominal tenderness.  There is no guarding or rebound. Hernia: No hernia is present. Comments: Well-healed midline infraumbilical scar with no evidence of infection. Musculoskeletal:         General: No swelling, tenderness or deformity. Normal range of motion. Cervical back: Neck supple. No muscular tenderness. Lymphadenopathy:      Cervical: No cervical adenopathy. Skin:     General: Skin is warm and dry. Coloration: Skin is not pale. Findings: No erythema or rash. Neurological:      General: No focal deficit present. Mental Status: He is alert and oriented to person, place, and time. Cranial Nerves: No cranial nerve deficit. Sensory: No sensory deficit. Deep Tendon Reflexes: Reflexes normal.   Psychiatric:         Mood and Affect: Mood normal.         Behavior: Behavior normal.         Thought Content: Thought content normal.         Judgment: Judgment normal.          Last labs reviewed. ASSESSMENT & PLAN :   Problem List        Circulatory    Multifocal atrial tachycardia (HCC) - Primary       most probably secondary to COPD exacerbation as he has not had any  episodes since his COPD has been under control. Referred to Dr. Jorge Luis Pineda for routine follow-up         Relevant Medications    DILT- MG extended release capsule    Other Relevant Orders    Amb External Referral To Cardiology    Essential hypertension       continue diltiazem as directed            Respiratory    COPD (chronic obstructive pulmonary disease) ([facility] [address]       continue inhalers and follow-up with Dr. Bird Kidney.   CT scan of his lungs to be done before that office visit in May         Relevant Medications    albuterol sulfate HFA (PROAIR HFA) 108 (90 Base) MCG/ACT inhaler    ipratropium-albuterol (DUONEB) 0.5-2.5 (3) MG/3ML SOLN nebulizer solution    fluticasone-salmeterol (ADVAIR DISKUS) 100-50 MCG/DOSE diskus inhaler    Chronic respiratory failure with hypoxia (HCC)       O2 at 2 L per nasal cannula at bedtime and during the day on a as needed basis. Should be noted that he uses almost 24/7         Relevant Medications    fluticasone-salmeterol (ADVAIR DISKUS) 100-50 MCG/DOSE diskus inhaler       Other    Nodule of upper lobe of left lung       CT scan as per pulmonologist         Pure hypercholesterolemia       watch diet and will check lipids next office visit         Relevant Medications    DILT- MG extended release capsule    Cervicalgia       try Biofreeze, gentle massage and NSAIDs. If no help see chiropractor. If no help we will get an x-ray of his cervical spine and send him to orthopedics surgeon for physical medicine doctor                Return in about 3 months (around 6/21/2022), or HTN, COPD, HLbe fasting.        Norma Self, DO  3/21/2022

## 2022-03-21 NOTE — ASSESSMENT & PLAN NOTE
try Biofreeze, gentle massage and NSAIDs. If no help see chiropractor.   If no help we will get an x-ray of his cervical spine and send him to orthopedics surgeon for physical medicine doctor

## 2022-03-21 NOTE — ASSESSMENT & PLAN NOTE
O2 at 2 L per nasal cannula at bedtime and during the day on a as needed basis.   Should be noted that he uses almost 24/7

## 2022-03-21 NOTE — ASSESSMENT & PLAN NOTE
continue inhalers and follow-up with Dr. Joseline Gill.   CT scan of his lungs to be done before that office visit in May

## 2022-03-21 NOTE — ASSESSMENT & PLAN NOTE
most probably secondary to COPD exacerbation as he has not had any  episodes since his COPD has been under control.   Referred to Dr. Kisha Vee for routine follow-up

## 2022-04-27 ENCOUNTER — OFFICE VISIT (OUTPATIENT)
Dept: FAMILY MEDICINE CLINIC | Age: 67
End: 2022-04-27
Payer: MEDICARE

## 2022-04-27 VITALS
BODY MASS INDEX: 27.78 KG/M2 | SYSTOLIC BLOOD PRESSURE: 146 MMHG | HEART RATE: 114 BPM | WEIGHT: 177 LBS | HEIGHT: 67 IN | DIASTOLIC BLOOD PRESSURE: 82 MMHG | TEMPERATURE: 98.2 F | RESPIRATION RATE: 20 BRPM | OXYGEN SATURATION: 96 %

## 2022-04-27 DIAGNOSIS — R05.9 COUGH: Primary | ICD-10-CM

## 2022-04-27 DIAGNOSIS — Z87.09 HISTORY OF COPD: ICD-10-CM

## 2022-04-27 PROCEDURE — 99213 OFFICE O/P EST LOW 20 MIN: CPT | Performed by: PHYSICIAN ASSISTANT

## 2022-04-27 RX ORDER — BENZONATATE 100 MG/1
100 CAPSULE ORAL 2 TIMES DAILY PRN
Qty: 20 CAPSULE | Refills: 0 | Status: SHIPPED | OUTPATIENT
Start: 2022-04-27 | End: 2022-05-07

## 2022-04-27 RX ORDER — PREDNISONE 20 MG/1
40 TABLET ORAL DAILY
Qty: 10 TABLET | Refills: 0 | Status: SHIPPED | OUTPATIENT
Start: 2022-04-27 | End: 2022-05-02

## 2022-04-27 RX ORDER — DOXYCYCLINE HYCLATE 100 MG
100 TABLET ORAL 2 TIMES DAILY
Qty: 20 TABLET | Refills: 0 | Status: SHIPPED | OUTPATIENT
Start: 2022-04-27 | End: 2022-05-07

## 2022-04-27 ASSESSMENT — ENCOUNTER SYMPTOMS
BACK PAIN: 0
VOMITING: 0
SORE THROAT: 1
PHOTOPHOBIA: 0
COUGH: 1
NAUSEA: 0
ABDOMINAL PAIN: 0
SHORTNESS OF BREATH: 0
DIARRHEA: 0

## 2022-04-27 NOTE — PROGRESS NOTES
22  Lokesh Serrano Syppko : 1955 Sex: male  Age 69 y.o. Subjective:  Chief Complaint   Patient presents with    Congestion     since easter         64-year-old male with a history of multifocal atrial tachycardia, hypertension, COPD, hyperlipidemia and hyperglycemia presents to the walk-in clinic for evaluation of sore throat, cough, congestion, headache, body aches and runny nose that began around [place]. He states he does have sputum production coughing up phlegm. He takes NyQuil and DayQuil which does relieve his symptoms. Patient wears 2 L of oxygen at home when he needs it. He states he has been using it more but has not had to go above his 2 L. He does DuoNeb treatments. He states his last treatment was yesterday but he uses albuterol inhaler this morning. Patient denies any fever, chills, nausea or vomiting. He denies any chest pain. Patient had his yearly CT scan done yesterday. Patient follows with Dr. Victorino Velasquez. Review of Systems   Constitutional: Negative for chills and fever. HENT: Positive for congestion, postnasal drip and sore throat. Negative for ear pain. Eyes: Negative for photophobia and visual disturbance. Respiratory: Positive for cough. Negative for shortness of breath. Cardiovascular: Negative for chest pain. Gastrointestinal: Negative for abdominal pain, diarrhea, nausea and vomiting. Genitourinary: Negative for difficulty urinating, dysuria, frequency and urgency. Musculoskeletal: Positive for myalgias. Negative for back pain, neck pain and neck stiffness. Skin: Negative for rash. Neurological: Positive for headaches. Negative for dizziness, syncope, weakness and light-headedness. Hematological: Negative for adenopathy. Does not bruise/bleed easily. Psychiatric/Behavioral: Negative for agitation and confusion. All other systems reviewed and are negative.         PMH:     Past Medical History:   Diagnosis Date    Alcohol abuse     Arthritis  COPD (chronic obstructive pulmonary disease) (HCC)     Hematochezia due to medication 12/9/2019    Multifocal atrial tachycardia (HCC)     paroxysmal    Nicotine dependence     Nodule of right lung     Umbilical hernia        Past Surgical History:   Procedure Laterality Date    COLONOSCOPY      TONSILLECTOMY      UMBILICAL HERNIA REPAIR N/A 6/4/8947    OPEN UMBILICAL HERNIA REPAIR WITH MESH performed by Samuel Davis DO at [facility] OR       Family History   Problem Relation Age of Onset    No Known Problems Mother     Heart Attack Father        Medications:     Current Outpatient Medications:     doxycycline hyclate (VIBRA-TABS) 100 MG tablet, Take 1 tablet by mouth 2 times daily for 10 days, Disp: 20 tablet, Rfl: 0    predniSONE (DELTASONE) 20 MG tablet, Take 2 tablets by mouth daily for 5 days, Disp: 10 tablet, Rfl: 0    benzonatate (TESSALON) 100 MG capsule, Take 1 capsule by mouth 2 times daily as needed for Cough, Disp: 20 capsule, Rfl: 0    OXYGEN, Inhale 2 L into the lungs as needed, Disp: , Rfl:     DILT- MG extended release capsule, Take 1 capsule by mouth daily, Disp: 90 capsule, Rfl: 3    albuterol sulfate HFA (PROAIR HFA) 108 (90 Base) MCG/ACT inhaler, Inhale 2 puffs into the lungs every 6 hours as needed for Wheezing Inhale into the lungs, Disp: 18 g, Rfl: 3    ipratropium-albuterol (DUONEB) 0.5-2.5 (3) MG/3ML SOLN nebulizer solution, Take 3 mLs by nebulization every 6 hours as needed for Shortness of Breath, Disp: 360 mL, Rfl: 3    fluticasone-salmeterol (ADVAIR DISKUS) 100-50 MCG/DOSE diskus inhaler, Inhale 1 puff into the lungs 2 times daily, Disp: 1 each, Rfl: 5    gabapentin (NEURONTIN) 300 MG capsule, Take 1 capsule by mouth 3 times daily for 180 days.  Intended supply: 90 days, Disp: 270 capsule, Rfl: 1    Allergies:   No Known Allergies    Social History:     Social History     Tobacco Use    Smoking status: Former Smoker     Packs/day: 1.50     Years: 45.00     Pack years: 67.50     Types: Cigarettes     Start date: 1973     Quit date: 2018     Years since quittin.3    Smokeless tobacco: Never Used   Vaping Use    Vaping Use: Never used   Substance Use Topics    Alcohol use: Yes     Alcohol/week: 4.0 standard drinks     Types: 4 Cans of beer per week    Drug use: Never       Patient lives at home. Physical Exam:     Vitals:    22 1601 22 1606   BP: (!) 146/82 (!) 146/82   Pulse: 114    Resp: 20    Temp: 98.2 °F (36.8 °C)    TempSrc: Temporal    SpO2: 96%    Weight: 177 lb (80.3 kg)    Height: 5' 7\" (1.702 m)        Exam:  Physical Exam  Vitals and nursing note reviewed. Constitutional:       General: He is not in acute distress. Appearance: He is well-developed. HENT:      Head: Normocephalic and atraumatic. Right Ear: Tympanic membrane normal.      Left Ear: Tympanic membrane normal.      Nose: Nose normal.      Mouth/Throat:      Mouth: Mucous membranes are moist.      Pharynx: Oropharynx is clear. Eyes:      Conjunctiva/sclera: Conjunctivae normal.      Pupils: Pupils are equal, round, and reactive to light. Cardiovascular:      Rate and Rhythm: Normal rate and regular rhythm. Pulmonary:      Effort: Pulmonary effort is normal. No respiratory distress. Comments: Patient has decreased breath sounds bilaterally although clear lung fields. Abdominal:      General: Bowel sounds are normal.      Palpations: Abdomen is soft. Tenderness: There is no abdominal tenderness. Musculoskeletal:         General: Normal range of motion. Cervical back: Normal range of motion. No rigidity. Lymphadenopathy:      Cervical: No cervical adenopathy. Skin:     General: Skin is warm and dry. Neurological:      General: No focal deficit present. Mental Status: He is alert and oriented to person, place, and time.    Psychiatric:         Mood and Affect: Mood normal.         Behavior: Behavior normal.         Thought Content: Thought content normal.         Judgment: Judgment normal.           Testing:     IMPRESSION:             Low-Dose CT (LDCT) Lung Screenin.  Stable 15 mm potential nodule in the right upper lobe unchanged from studies dating back to      2018 most likely scarring.  Additional 3-5 mm nodules left lower lobe unchanged.  Annual Low-Dose      CT Lung Screening is suggested.             2.  Emphysematous changes with upper lobe predominance.             RECOMMENDATION: Category 1: Negative. Annual Low-Dose CT Lung Screening examination is recommended.             Medical Decision Making:     Patient upon arrival did not appear toxic or lethargic. Vital signs were reviewed. Past medical history reviewed. Allergies reviewed. Medications reviewed. Patient is presenting with the above complaint of cough and congestion. The results of the patient's recent CT scan as described above. He has a stable 15 mm potential nodule in the right upper lobe which has been unchanged. There are emphysematous changes with upper lobe predominance. Differential diagnosis was discussed with the patient. Patient will be given a prescription for doxycycline, prednisone and Tessalon Perles. Patient may use over-the-counter analgesics and decongestants as needed. Patient is continue to monitor symptoms and follow-up with their primary care provider in 3-5 days if no improvement. Patient will return or go to the emergency department for any worsening symptoms. Patient understands the plan is agreeable. Clinical Impression:   Annabella Hung was seen today for congestion. Diagnoses and all orders for this visit:    Cough    History of COPD    Other orders  -     doxycycline hyclate (VIBRA-TABS) 100 MG tablet; Take 1 tablet by mouth 2 times daily for 10 days  -     predniSONE (DELTASONE) 20 MG tablet; Take 2 tablets by mouth daily for 5 days  -     benzonatate (TESSALON) 100 MG capsule;  Take 1 capsule by mouth 2 times daily as needed for Cough        The patient is to call for any concerns or return if any of the signs or symptoms worsen. The patient is to follow-up with PCP in the next 2-3 days for repeat evaluation repeat assessment or go directly to the emergency department. SIGNATURE: Jess Nick PA-C

## 2022-05-02 DIAGNOSIS — M54.16 LUMBAR RADICULOPATHY: ICD-10-CM

## 2022-05-02 RX ORDER — GABAPENTIN 300 MG/1
300 CAPSULE ORAL 3 TIMES DAILY
Qty: 270 CAPSULE | Refills: 1 | Status: SHIPPED
Start: 2022-05-02 | End: 2022-11-01 | Stop reason: SDUPTHER

## 2022-05-02 NOTE — TELEPHONE ENCOUNTER
Name of Medication(s) Requested:  gapapentin    Pharmacy Requested:   [facility]    Medication(s) pended? [x] Yes  [] No    Last Appointment:  3/21/2022    Future appts:  Future Appointments   Date Time Provider Adela Trinity   5/10/2022 11:00 AM Shanita Roberts MD [facility]   6/20/2022 10:00 AM [address] [place] [address] [facility]   12/27/2022  9:00 AM [address] [place] [address] [facility]          Does patient need call back?   [] Yes  [x] No

## 2022-05-11 ENCOUNTER — TELEPHONE (OUTPATIENT)
Dept: PRIMARY CARE CLINIC | Age: 67
End: 2022-05-11

## 2022-05-11 DIAGNOSIS — J43.9 PULMONARY EMPHYSEMA, UNSPECIFIED EMPHYSEMA TYPE (HCC): Primary | ICD-10-CM

## 2022-05-11 RX ORDER — FLUTICASONE PROPIONATE AND SALMETEROL XINAFOATE 45; 21 UG/1; UG/1
2 AEROSOL, METERED RESPIRATORY (INHALATION) 2 TIMES DAILY
Qty: 1 EACH | Refills: 5
Start: 2022-05-11 | End: 2022-05-13 | Stop reason: SDUPTHER

## 2022-05-11 NOTE — TELEPHONE ENCOUNTER
Pt needs a refill on Advair, order has , not in the system to [facility].

## 2022-05-13 DIAGNOSIS — J43.9 PULMONARY EMPHYSEMA, UNSPECIFIED EMPHYSEMA TYPE (HCC): ICD-10-CM

## 2022-05-13 RX ORDER — FLUTICASONE PROPIONATE AND SALMETEROL XINAFOATE 45; 21 UG/1; UG/1
2 AEROSOL, METERED RESPIRATORY (INHALATION) 2 TIMES DAILY
Qty: 1 EACH | Refills: 5 | Status: SHIPPED
Start: 2022-05-13 | End: 2022-11-01 | Stop reason: SDUPTHER

## 2022-05-13 NOTE — TELEPHONE ENCOUNTER
Last Appointment:  3/21/2022  Future Appointments   Date Time Provider Adela Petersen   6/20/2022 10:00 AM Davida Perez DO [facility]   12/27/2022  9:00 AM Chavo Carrillo DO [facility]   5/16/2023 11:00 AM Tonja Forrest MD [facility]      Patient called pharmacy does not have Advair script. It does appear it did not go over. Pended for your signature.       Electronically signed by Huseyin Verma LPN on 6/34/6149 at 23:07 AM

## 2022-05-17 NOTE — TELEPHONE ENCOUNTER
Patient called back again about this medication refill. I explained that it looks like Dr Annamaria Bravo could not order it as the discus anymore, so she sent it as the inhaler. He said he did not understand why, bc he has used it like that for years and he did not  the inhaler bc no one told him of the change. I advised that if he has a pulmonologist, he is welcome to see if they are able to get it as the discus form, but Dr Annamaria Bravo will now be ordering it as an inhaler. He said he is going to check with Dr Victorino Velasquez and see if they can get him the discus.

## 2022-06-20 ENCOUNTER — OFFICE VISIT (OUTPATIENT)
Dept: PRIMARY CARE CLINIC | Age: 67
End: 2022-06-20
Payer: MEDICARE

## 2022-06-20 VITALS
HEART RATE: 81 BPM | OXYGEN SATURATION: 92 % | DIASTOLIC BLOOD PRESSURE: 66 MMHG | TEMPERATURE: 97.7 F | HEIGHT: 67 IN | WEIGHT: 172 LBS | BODY MASS INDEX: 27 KG/M2 | SYSTOLIC BLOOD PRESSURE: 132 MMHG

## 2022-06-20 DIAGNOSIS — Z11.59 NEED FOR HEPATITIS C SCREENING TEST: ICD-10-CM

## 2022-06-20 DIAGNOSIS — J43.9 PULMONARY EMPHYSEMA, UNSPECIFIED EMPHYSEMA TYPE (HCC): ICD-10-CM

## 2022-06-20 DIAGNOSIS — I10 ESSENTIAL HYPERTENSION: Primary | ICD-10-CM

## 2022-06-20 DIAGNOSIS — R73.09 ELEVATED HEMOGLOBIN A1C: ICD-10-CM

## 2022-06-20 DIAGNOSIS — I10 ESSENTIAL HYPERTENSION: ICD-10-CM

## 2022-06-20 DIAGNOSIS — E78.00 PURE HYPERCHOLESTEROLEMIA: ICD-10-CM

## 2022-06-20 DIAGNOSIS — I47.1 MULTIFOCAL ATRIAL TACHYCARDIA (HCC): ICD-10-CM

## 2022-06-20 DIAGNOSIS — J96.11 CHRONIC RESPIRATORY FAILURE WITH HYPOXIA (HCC): ICD-10-CM

## 2022-06-20 PROBLEM — R91.8 LUNG MASS: Status: RESOLVED | Noted: 2019-05-20 | Resolved: 2022-06-20

## 2022-06-20 PROBLEM — K42.9 UMBILICAL HERNIA WITHOUT OBSTRUCTION AND WITHOUT GANGRENE: Status: RESOLVED | Noted: 2021-09-27 | Resolved: 2022-06-20

## 2022-06-20 LAB
ALBUMIN SERPL-MCNC: 4.8 G/DL (ref 3.5–5.2)
ALP BLD-CCNC: 62 U/L (ref 40–129)
ALT SERPL-CCNC: 11 U/L (ref 0–40)
ANION GAP SERPL CALCULATED.3IONS-SCNC: 16 MMOL/L (ref 7–16)
AST SERPL-CCNC: 18 U/L (ref 0–39)
BILIRUB SERPL-MCNC: 0.6 MG/DL (ref 0–1.2)
BUN BLDV-MCNC: 11 MG/DL (ref 6–23)
CALCIUM SERPL-MCNC: 9.4 MG/DL (ref 8.6–10.2)
CHLORIDE BLD-SCNC: 105 MMOL/L (ref 98–107)
CHOLESTEROL, TOTAL: 227 MG/DL (ref 0–199)
CO2: 22 MMOL/L (ref 22–29)
CREAT SERPL-MCNC: 0.8 MG/DL (ref 0.7–1.2)
GFR AFRICAN AMERICAN: >60
GFR NON-AFRICAN AMERICAN: >60 ML/MIN/1.73
GLUCOSE BLD-MCNC: 112 MG/DL (ref 74–99)
HBA1C MFR BLD: 5.6 % (ref 4–5.6)
HDLC SERPL-MCNC: 49 MG/DL
LDL CHOLESTEROL CALCULATED: 148 MG/DL (ref 0–99)
POTASSIUM SERPL-SCNC: 4.2 MMOL/L (ref 3.5–5)
SODIUM BLD-SCNC: 143 MMOL/L (ref 132–146)
TOTAL PROTEIN: 7.5 G/DL (ref 6.4–8.3)
TRIGL SERPL-MCNC: 152 MG/DL (ref 0–149)
VLDLC SERPL CALC-MCNC: 30 MG/DL

## 2022-06-20 PROCEDURE — 1123F ACP DISCUSS/DSCN MKR DOCD: CPT | Performed by: INTERNAL MEDICINE

## 2022-06-20 PROCEDURE — 99214 OFFICE O/P EST MOD 30 MIN: CPT | Performed by: INTERNAL MEDICINE

## 2022-06-20 ASSESSMENT — ENCOUNTER SYMPTOMS
NAUSEA: 0
VOMITING: 0
TROUBLE SWALLOWING: 0
STRIDOR: 0
SHORTNESS OF BREATH: 1
COUGH: 0
CONSTIPATION: 0
ANAL BLEEDING: 0
EYE PAIN: 0
BLOOD IN STOOL: 0
EYE DISCHARGE: 0
ABDOMINAL PAIN: 0
PHOTOPHOBIA: 0
EYE ITCHING: 0
DIARRHEA: 0
COLOR CHANGE: 0
RHINORRHEA: 0
FACIAL SWELLING: 0
SORE THROAT: 0
WHEEZING: 0

## 2022-06-20 ASSESSMENT — PATIENT HEALTH QUESTIONNAIRE - PHQ9
2. FEELING DOWN, DEPRESSED OR HOPELESS: 0
1. LITTLE INTEREST OR PLEASURE IN DOING THINGS: 0
SUM OF ALL RESPONSES TO PHQ QUESTIONS 1-9: 0
SUM OF ALL RESPONSES TO PHQ QUESTIONS 1-9: 0
SUM OF ALL RESPONSES TO PHQ9 QUESTIONS 1 & 2: 0
SUM OF ALL RESPONSES TO PHQ QUESTIONS 1-9: 0
SUM OF ALL RESPONSES TO PHQ QUESTIONS 1-9: 0

## 2022-06-20 ASSESSMENT — COPD QUESTIONNAIRES: COPD: 1

## 2022-06-20 NOTE — ASSESSMENT & PLAN NOTE
Blood pressures are stable. Continue medications and monitor blood pressures at home.  Call office if systolics are over 972 over diastolics over 90.  check fasting CMP

## 2022-06-20 NOTE — PROGRESS NOTES
2022    Name: Leatha Epley : 1955 Sex: male  Age: 69 y.o. Subjective:  Chief Complaint   Patient presents with    Hypertension        Hypertension  Associated symptoms include shortness of breath. Pertinent negatives include no chest pain, headaches or palpitations. COPD  He complains of shortness of breath. There is no cough or wheezing. Pertinent negatives include no appetite change, chest pain, ear pain, headaches, myalgias, rhinorrhea, sore throat or trouble swallowing. Patient had his umbilical hernia repair done in February and did well. He had pulmonary clearance from Dr. Polly Mcduffie. Patient has a history of COPD . He also has chronic hypoxic respiratory failure on oxygen 24/7. When previously seen at pulmonary office he failed his  6-minute walk test.  . He stays on his oxygen 24/7    He denies any fever, chills, nausea or vomiting. He still has a cough but that is chronic for him. CT scan of his chest last year revealed spiculated lesion in the right lung which is unchanged. They thought it might be scarring from his previous pneumonia. Repeat CT scan of his chest in 2021 showed a stable 15 mm right upper lobe pulmonary nodule dating back to 2018, chronic appearing volume loss right upper lobe and lingula most probably secondary to scarring. Rest of the lung fields remain clear. Kyle Rader CT scan of his lung revealed stable nodule of 15 mm of the right upper lobe. He saw Dr. Polly Mcduffie after that. I reviewed his evaluation and recommendations. He has a history of multifocal atrial tachycardia while he was in the hospital.  He was placed on diltiazem and he has noticed that his ankles are more swollen at the end of the day. No worsening shortness of breath. He has not seen his cardiologist in over a year. I told him to call Dr. Thomas Lorenzo office and make an appointment for follow-up. He complains of pain in his neck.   Mainly when he moves it around or tries to side bend his neck. He has tried over-the-counter NSAID's with little relief. He does have a history of arthritis. He complains of pain in his feet and calves when he walks. Relieved by rest.  He noticed that the soles of his feet are very sensitive but he says that it has been that way since he was a child. Because of his previous history of nicotine use, will need to make sure he does not have peripheral vascular disease. He had an ultrasound of his abdominal aorta to rule out aortic aneurysm because he was a heavy smoker. No sign of any aneurysm. DL was done which showed some possibility of decreased blood flow to both lower extremities. CTA of his abdominal aorta with runoff was done which showed no evidence of aortic aneurysm and good blood flow to his legs. I think he might have some element of lumbar radiculopathy. He complains of numbness and tingling and pain of his legs. We started him on gabapentin his last visit and he is going to increase it to 300 mg a day then he will start taking 300 mg twice a day and then after a while take 300 milligrams 3 times a day. He was scheduled for nerve conduction test with a never got them done. Gisell Dinhtim His leg pain has improved tremendously since being on the gabapentin. Blood work done in December 2021 showed his total cholesterol was 229 with LDL cholesterol of 161. BMP and PSA screen were normall    He finished his COVID-19 vaccination series. He is up-to-date on his pneumonia vaccinations, and Tdap. He had his Shingrix series at [address] [place] [address] in [place] and will get a copy of the report. He got  his flu shot      Review of Systems   Constitutional: Negative for appetite change, fatigue and unexpected weight change. HENT: Negative for congestion, ear pain, facial swelling, rhinorrhea, sore throat, tinnitus and trouble swallowing. Eyes: Negative for photophobia, pain, discharge, itching and visual disturbance.    Respiratory: Positive for shortness of breath. Negative for cough, wheezing and stridor. Going up and down steps   Cardiovascular: Positive for leg swelling. Negative for chest pain and palpitations. Gastrointestinal: Negative for abdominal pain, anal bleeding, blood in stool, constipation, diarrhea, nausea and vomiting. Endocrine: Negative for cold intolerance, heat intolerance, polydipsia, polyphagia and polyuria. Genitourinary: Negative for difficulty urinating, dysuria, flank pain, frequency, hematuria and urgency. Musculoskeletal: Positive for arthralgias. Negative for gait problem, joint swelling and myalgias. Skin: Negative for color change, pallor and rash. Allergic/Immunologic: Negative for environmental allergies and food allergies. Neurological: Positive for dizziness. Negative for tremors, seizures, syncope, speech difficulty, weakness, light-headedness, numbness and headaches. Left side   Hematological: Negative for adenopathy. Does not bruise/bleed easily. Psychiatric/Behavioral: Negative for agitation, behavioral problems, confusion, sleep disturbance and suicidal ideas. The patient is not nervous/anxious. Current Outpatient Medications:     ADVAIR HFA 45-21 MCG/ACT inhaler, Inhale 2 puffs into the lungs 2 times daily, Disp: 1 each, Rfl: 5    gabapentin (NEURONTIN) 300 MG capsule, Take 1 capsule by mouth 3 times daily for 180 days.  Intended supply: 90 days, Disp: 270 capsule, Rfl: 1    OXYGEN, Inhale 2 L into the lungs as needed, Disp: , Rfl:     DILT- MG extended release capsule, Take 1 capsule by mouth daily, Disp: 90 capsule, Rfl: 3    albuterol sulfate HFA (PROAIR HFA) 108 (90 Base) MCG/ACT inhaler, Inhale 2 puffs into the lungs every 6 hours as needed for Wheezing Inhale into the lungs, Disp: 18 g, Rfl: 3    ipratropium-albuterol (DUONEB) 0.5-2.5 (3) MG/3ML SOLN nebulizer solution, Take 3 mLs by nebulization every 6 hours as needed for Shortness of Breath, Disp: 360 mL, Rfl: 3 No Known Allergies     Past Medical History:   Diagnosis Date    Alcohol abuse     Arthritis     [facility] [address]     Hematochezia due to medication 2019    Multifocal atrial tachycardia (HCC)     paroxysmal    Nicotine dependence     Nodule of right lung        Health Maintenance Due   Topic Date Due    Hepatitis C screen  Never done        Patient Active Problem List   Diagnosis    Multifocal atrial tachycardia (HCC)    Lumbar arthropathy    Lumbar radiculopathy    Nodule of upper lobe of left lung    COPD [facility]chronic obstructive [facility] disease) [facility] [address]    Essential hypertension    Hyperglycemia    Localized edema    Chronic respiratory failure with hypoxia (HCC)    Pure hypercholesterolemia    Cervicalgia    Elevated hemoglobin A1c    Need for hepatitis C screening test        Past Surgical History:   Procedure Laterality Date    COLONOSCOPY      TONSILLECTOMY      UMBILICAL HERNIA REPAIR N/A 9506    OPEN UMBILICAL HERNIA REPAIR WITH MESH performed by Chivo Cast, DO at [facility] OR        Family History   Problem Relation Age of Onset    No Known Problems Mother     Heart Attack Father         Social History     Tobacco Use    Smoking status: Former Smoker     Packs/day: 1.50     Years: 45.00     Pack years: 67.50     Types: Cigarettes     Start date: 1973     Quit date: 2018     Years since quittin.4    Smokeless tobacco: Never Used   Vaping Use    Vaping Use: Never used   Substance Use Topics    Alcohol use: Yes     Alcohol/week: 4.0 standard drinks     Types: 4 Cans of beer per week    Drug use: Never        Objective  Vitals:    22 1008   BP: 132/66   Pulse: 81   Temp: 97.7 °F (36.5 °C)   SpO2: 92%   Weight: 172 lb (78 kg)   Height: 5' 7\" (1.702 m)        Exam:  Physical Exam  Vitals reviewed. Constitutional:       General: He is not in acute distress. Appearance: Normal appearance.  He is well-developed and normal weight. He is not ill-appearing. Comments: Oxygen per nasal cannula at 2 L   HENT:      Head: Normocephalic and atraumatic. Right Ear: Tympanic membrane, ear canal and external ear normal. There is no impacted cerumen. Left Ear: Tympanic membrane, ear canal and external ear normal. There is no impacted cerumen. Eyes:      General: No scleral icterus. Right eye: No discharge. Left eye: No discharge. Conjunctiva/sclera: Conjunctivae normal.      Pupils: Pupils are equal, round, and reactive to light. Neck:      Thyroid: No thyromegaly. Vascular: No carotid bruit. Comments: He has decreased range of motion to sidebending with tenderness in the suboccipital area and some muscle spasms on his posterior  sternocleidomastoid muscle  Cardiovascular:      Rate and Rhythm: Normal rate and regular rhythm. Heart sounds: Normal heart sounds. No murmur heard. No friction rub. No gallop. Comments: Peripheral pulses are lightly decreased, DP and PT  Pulmonary:      Effort: Pulmonary effort is normal. No respiratory distress. Breath sounds: Decreased breath sounds present. No wheezing or rales. Comments: Breath sounds are markedly decreased bilaterally  Chest:      Chest wall: No tenderness. Abdominal:      General: Bowel sounds are normal. There is no distension. Palpations: Abdomen is soft. There is no mass. Tenderness: There is no abdominal tenderness. There is no guarding or rebound. Hernia: No hernia is present. Comments: Well-healed midline infraumbilical scar with no evidence of infection. Musculoskeletal:         General: No swelling, tenderness or deformity. Normal range of motion. Cervical back: Neck supple. No muscular tenderness. Lymphadenopathy:      Cervical: No cervical adenopathy. Skin:     General: Skin is warm and dry. Coloration: Skin is not pale. Findings: No erythema or rash.    Neurological: General: No focal deficit present. Mental Status: He is alert and oriented to person, place, and time. Cranial Nerves: No cranial nerve deficit. Sensory: No sensory deficit. Deep Tendon Reflexes: Reflexes normal.   Psychiatric:         Mood and Affect: Mood normal.         Behavior: Behavior normal.         Thought Content: Thought content normal.         Judgment: Judgment normal.          Last labs reviewed. ASSESSMENT & PLAN :   Problem List        Circulatory    Multifocal atrial tachycardia (HCC)       no further episodes of multifocal atrial tachycardia. Continue his calcium channel blocker         Relevant Medications    DILT- MG extended release capsule    Essential hypertension - Primary     Blood pressures are stable. Continue medications and monitor blood pressures at home. Call office if systolics are over 362 over diastolics over 90.  check fasting CMP         Relevant Orders    Comprehensive Metabolic Panel (Completed)       Respiratory    COPD (chronic obstructive pulmonary disease) ([facility] [address]       continue follow-up with pulmonary, continue inhalers         Relevant Medications    albuterol sulfate HFA (PROAIR HFA) 108 (90 Base) MCG/ACT inhaler    ipratropium-albuterol (DUONEB) 0.5-2.5 (3) MG/3ML SOLN nebulizer solution    ADVAIR HFA 45-21 MCG/ACT inhaler    Chronic respiratory failure with hypoxia (HCC)       continue oxygen 24/7            Other    Elevated hemoglobin A1c       check hemoglobin A1c         Relevant Orders    Hemoglobin A1C (Completed)    Need for hepatitis C screening test       hepatitis C antibody screen         Relevant Orders    Hepatitis C Antibody    Pure hypercholesterolemia     Watch saturated fats in diet and will monitor lipids          Relevant Medications    DILT- MG extended release capsule    Other Relevant Orders    Lipid Panel (Completed)           Return in about 3 months (around 9/20/2022), or copd, HL.        Loanne [address] Robbie Munguia,   6/20/2022

## 2022-06-21 LAB — HEPATITIS C ANTIBODY INTERPRETATION: NORMAL

## 2022-06-27 ENCOUNTER — TELEPHONE (OUTPATIENT)
Dept: PRIMARY CARE CLINIC | Age: 67
End: 2022-06-27

## 2022-06-27 DIAGNOSIS — E78.00 PURE HYPERCHOLESTEROLEMIA: Primary | ICD-10-CM

## 2022-06-27 RX ORDER — ROSUVASTATIN CALCIUM 5 MG/1
5 TABLET, COATED ORAL DAILY
Qty: 30 TABLET | Refills: 5 | Status: SHIPPED
Start: 2022-06-27 | End: 2022-11-01 | Stop reason: DRUGHIGH

## 2022-06-27 NOTE — TELEPHONE ENCOUNTER
Patient went to pharmacy to  statin. They had not received anything yet.     Electronically signed by Kiera Fried LPN on 9/46/5573 at 3:16 PM

## 2022-06-27 NOTE — TELEPHONE ENCOUNTER
Rosuvastatin 5 mg sent to [facility] in [place].   Take at bedtime

## 2022-06-28 NOTE — TELEPHONE ENCOUNTER
Left message for patient Rosuvastatin 5 mg to [facility] and to take med at bedtime. Advised if he has questions to call us back or if questions are regarding medication to please discuss with pharmacists when picking up med.

## 2022-07-20 PROBLEM — Z11.59 NEED FOR HEPATITIS C SCREENING TEST: Status: RESOLVED | Noted: 2022-06-20 | Resolved: 2022-07-20

## 2022-09-20 ENCOUNTER — OFFICE VISIT (OUTPATIENT)
Dept: PRIMARY CARE CLINIC | Age: 67
End: 2022-09-20
Payer: MEDICARE

## 2022-09-20 VITALS
OXYGEN SATURATION: 93 % | BODY MASS INDEX: 27.47 KG/M2 | SYSTOLIC BLOOD PRESSURE: 138 MMHG | HEIGHT: 67 IN | DIASTOLIC BLOOD PRESSURE: 72 MMHG | TEMPERATURE: 97.6 F | HEART RATE: 85 BPM | WEIGHT: 175 LBS

## 2022-09-20 DIAGNOSIS — Z23 NEED FOR INFLUENZA VACCINATION: ICD-10-CM

## 2022-09-20 DIAGNOSIS — J96.11 CHRONIC RESPIRATORY FAILURE WITH HYPOXIA (HCC): ICD-10-CM

## 2022-09-20 DIAGNOSIS — E78.00 PURE HYPERCHOLESTEROLEMIA: ICD-10-CM

## 2022-09-20 DIAGNOSIS — R73.9 HYPERGLYCEMIA: ICD-10-CM

## 2022-09-20 DIAGNOSIS — I47.1 MULTIFOCAL ATRIAL TACHYCARDIA (HCC): ICD-10-CM

## 2022-09-20 DIAGNOSIS — J43.9 PULMONARY EMPHYSEMA, UNSPECIFIED EMPHYSEMA TYPE (HCC): ICD-10-CM

## 2022-09-20 DIAGNOSIS — M54.50 LUMBAR PAIN: ICD-10-CM

## 2022-09-20 DIAGNOSIS — M54.50 LUMBAR PAIN: Primary | ICD-10-CM

## 2022-09-20 DIAGNOSIS — M47.816 LUMBAR ARTHROPATHY: ICD-10-CM

## 2022-09-20 PROBLEM — I47.19 MULTIFOCAL ATRIAL TACHYCARDIA: Status: RESOLVED | Noted: 2019-05-20 | Resolved: 2022-09-20

## 2022-09-20 LAB
ALBUMIN SERPL-MCNC: 4.8 G/DL (ref 3.5–5.2)
ALP BLD-CCNC: 57 U/L (ref 40–129)
ALT SERPL-CCNC: 12 U/L (ref 0–40)
ANION GAP SERPL CALCULATED.3IONS-SCNC: 11 MMOL/L (ref 7–16)
AST SERPL-CCNC: 19 U/L (ref 0–39)
BILIRUB SERPL-MCNC: 0.5 MG/DL (ref 0–1.2)
BUN BLDV-MCNC: 10 MG/DL (ref 6–23)
CALCIUM SERPL-MCNC: 9.5 MG/DL (ref 8.6–10.2)
CHLORIDE BLD-SCNC: 102 MMOL/L (ref 98–107)
CHOLESTEROL, TOTAL: 166 MG/DL (ref 0–199)
CO2: 27 MMOL/L (ref 22–29)
CREAT SERPL-MCNC: 0.9 MG/DL (ref 0.7–1.2)
GFR AFRICAN AMERICAN: >60
GFR NON-AFRICAN AMERICAN: >60 ML/MIN/1.73
GLUCOSE BLD-MCNC: 104 MG/DL (ref 74–99)
HBA1C MFR BLD: 5.8 % (ref 4–5.6)
HDLC SERPL-MCNC: 49 MG/DL
LDL CHOLESTEROL CALCULATED: 88 MG/DL (ref 0–99)
POTASSIUM SERPL-SCNC: 4.4 MMOL/L (ref 3.5–5)
SODIUM BLD-SCNC: 140 MMOL/L (ref 132–146)
TOTAL CK: 164 U/L (ref 20–200)
TOTAL PROTEIN: 7.4 G/DL (ref 6.4–8.3)
TRIGL SERPL-MCNC: 144 MG/DL (ref 0–149)
VLDLC SERPL CALC-MCNC: 29 MG/DL

## 2022-09-20 PROCEDURE — 99214 OFFICE O/P EST MOD 30 MIN: CPT | Performed by: INTERNAL MEDICINE

## 2022-09-20 PROCEDURE — 1123F ACP DISCUSS/DSCN MKR DOCD: CPT | Performed by: INTERNAL MEDICINE

## 2022-09-20 PROCEDURE — 90694 VACC AIIV4 NO PRSRV 0.5ML IM: CPT | Performed by: INTERNAL MEDICINE

## 2022-09-20 PROCEDURE — G0008 ADMIN INFLUENZA VIRUS VAC: HCPCS | Performed by: INTERNAL MEDICINE

## 2022-09-20 RX ORDER — MELOXICAM 7.5 MG/1
7.5 TABLET ORAL DAILY PRN
Qty: 30 TABLET | Refills: 5 | Status: SHIPPED | OUTPATIENT
Start: 2022-09-20

## 2022-09-20 SDOH — ECONOMIC STABILITY: FOOD INSECURITY: WITHIN THE PAST 12 MONTHS, YOU WORRIED THAT YOUR FOOD WOULD RUN OUT BEFORE YOU GOT MONEY TO BUY MORE.: NEVER TRUE

## 2022-09-20 SDOH — ECONOMIC STABILITY: FOOD INSECURITY: WITHIN THE PAST 12 MONTHS, THE FOOD YOU BOUGHT JUST DIDN'T LAST AND YOU DIDN'T HAVE MONEY TO GET MORE.: NEVER TRUE

## 2022-09-20 ASSESSMENT — SOCIAL DETERMINANTS OF HEALTH (SDOH): HOW HARD IS IT FOR YOU TO PAY FOR THE VERY BASICS LIKE FOOD, HOUSING, MEDICAL CARE, AND HEATING?: NOT HARD AT ALL

## 2022-09-20 ASSESSMENT — ENCOUNTER SYMPTOMS
SORE THROAT: 0
ABDOMINAL PAIN: 0
SHORTNESS OF BREATH: 1
ANAL BLEEDING: 0
EYE ITCHING: 0
EYE PAIN: 0
WHEEZING: 0
VOMITING: 0
CONSTIPATION: 0
FACIAL SWELLING: 0
TROUBLE SWALLOWING: 0
NAUSEA: 0
DIARRHEA: 0
PHOTOPHOBIA: 0
STRIDOR: 0
COUGH: 0
COLOR CHANGE: 0
BACK PAIN: 1
EYE DISCHARGE: 0
RHINORRHEA: 0
BLOOD IN STOOL: 0

## 2022-09-20 ASSESSMENT — COPD QUESTIONNAIRES: COPD: 1

## 2022-09-20 NOTE — ASSESSMENT & PLAN NOTE
continue rosuvastatin 5 mg and diet. Check fasting lipid profile. Check CK and sed rate to make sure that the rosuvastatin is contributing to his myalgias and arthralgias.   I doubt it because he had the symptoms for long before rosuvastatin was started

## 2022-09-20 NOTE — PROGRESS NOTES
He was placed on diltiazem and he has noticed that his ankles are more swollen at the end of the day. No worsening shortness of breath. He has not seen his cardiologist in over a year. I told him to call Dr. Miley Aldridge office and make an appointment for follow-up. I wonder if his swelling in his ankles is due to the diltiazem. If he does not need it for blood pressure control this could probably be discontinued but I would like cardiology input. If it is for blood pressure control may switch him to a different antihypertensive      He complains of pain in his feet and calves when he walks. Relieved by rest.  He noticed that the soles of his feet are very sensitive but he says that it has been that way since he was a child. Because of his previous history of nicotine use, will need to make sure he does not have peripheral vascular disease. He had an ultrasound of his abdominal aorta to rule out aortic aneurysm because he was a heavy smoker. No sign of any aneurysm. DL was done which showed some possibility of decreased blood flow to both lower extremities. CTA of his abdominal aorta with runoff was done which showed no evidence of aortic aneurysm and good blood flow to his legs. I think he might have some element of lumbar radiculopathy. He complains of numbness and tingling and pain of his legs. We started him on gabapentin his last visit and he is going to increase it to 300 mg a day then he will start taking 300 mg twice a day and then after a while take 300 milligrams 3 times a day. He was scheduled for nerve conduction test with a never got them done. Sherryle Moder His leg pain has improved tremendously since being on the gabapentin. Blood work in June 2022 showed his hemoglobin A1c had improved to 5.6%. L total cholesterol still high at 227, triglycerides 152 and LDL cholesterol 148. This was before he was started on rosuvastatin.   Note that his myalgias and arthralgias started long before he started rosuvastatin. We will check a CK and sed rate just in case rosuvastatin is contributing to his problem. His fasting blood sugar was elevated at 112. His hepatitis C antibody was negative    He finished his COVID-19 vaccination series. He is up-to-date on his pneumonia vaccinations, and Tdap. He had his Shingrix series at [facility] in [place] and will get a copy of the report. He needs his flu shot      Review of Systems   Constitutional:  Negative for appetite change, fatigue and unexpected weight change. HENT:  Negative for congestion, ear pain, facial swelling, rhinorrhea, sore throat, tinnitus and trouble swallowing. Eyes:  Negative for photophobia, pain, discharge, itching and visual disturbance. Respiratory:  Positive for shortness of breath. Negative for cough, wheezing and stridor. Going up and down steps   Cardiovascular:  Positive for leg swelling. Negative for chest pain and palpitations. Gastrointestinal:  Negative for abdominal pain, anal bleeding, blood in stool, constipation, diarrhea, nausea and vomiting. Endocrine: Negative for cold intolerance, heat intolerance, polydipsia, polyphagia and polyuria. Genitourinary:  Negative for difficulty urinating, dysuria, flank pain, frequency, hematuria and urgency. Musculoskeletal:  Positive for arthralgias and back pain. Negative for gait problem, joint swelling and myalgias. Skin:  Negative for color change, pallor and rash. Allergic/Immunologic: Negative for environmental allergies and food allergies. Neurological:  Negative for dizziness, tremors, seizures, syncope, speech difficulty, weakness, light-headedness, numbness and headaches. Left side   Hematological:  Negative for adenopathy. Does not bruise/bleed easily. Psychiatric/Behavioral:  Negative for agitation, behavioral problems, confusion, sleep disturbance and suicidal ideas. The patient is not nervous/anxious.          Current Outpatient Medications: N/A 4092    OPEN UMBILICAL HERNIA REPAIR WITH MESH performed by Courtney Alarcon DO at [facility] OR        Family History   Problem Relation Age of Onset    No Known Problems Mother     Heart Attack Father         Social History     Tobacco Use    Smoking status: Former     Packs/day: 1.50     Years: 45.00     Pack years: 67.50     Types: Cigarettes     Start date: 1973     Quit date: 2018     Years since quittin.7    Smokeless tobacco: Never   Vaping Use    Vaping Use: Never used   Substance Use Topics    Alcohol use: Yes     Alcohol/week: 4.0 standard drinks     Types: 4 Cans of beer per week    Drug use: Never        Objective  Vitals:    22 0927   BP: 138/72   Pulse: 85   Temp: 97.6 °F (36.4 °C)   SpO2: 93%   Weight: 175 lb (79.4 kg)   Height: 5' 7\" (1.702 m)        Exam:  Physical Exam  Vitals reviewed. Constitutional:       General: He is not in acute distress. Appearance: Normal appearance. He is well-developed and normal weight. He is not ill-appearing. Comments: Oxygen per nasal cannula at 2 L   HENT:      Head: Normocephalic and atraumatic. Right Ear: Tympanic membrane, ear canal and external ear normal. There is no impacted cerumen. Left Ear: Tympanic membrane, ear canal and external ear normal. There is no impacted cerumen. Eyes:      General: No scleral icterus. Right eye: No discharge. Left eye: No discharge. Conjunctiva/sclera: Conjunctivae normal.      Pupils: Pupils are equal, round, and reactive to light. Neck:      Thyroid: No thyromegaly. Vascular: No carotid bruit. Comments: He has decreased range of motion to sidebending with tenderness in the suboccipital area and some muscle spasms on his posterior  sternocleidomastoid muscle  Cardiovascular:      Rate and Rhythm: Normal rate and regular rhythm. Heart sounds: Normal heart sounds. No murmur heard. No friction rub. No gallop.       Comments: Peripheral pulses are lightly decreased, DP and PT  Pulmonary:      Effort: Pulmonary effort is normal. No respiratory distress. Breath sounds: Decreased breath sounds present. No wheezing or rales. Comments: Breath sounds are markedly decreased bilaterally  Chest:      Chest wall: No tenderness. Abdominal:      General: Bowel sounds are normal. There is no distension. Palpations: Abdomen is soft. There is no mass. Tenderness: There is no abdominal tenderness. There is no guarding or rebound. Hernia: No hernia is present. Comments: Well-healed midline infraumbilical scar with no evidence of infection. Musculoskeletal:         General: No swelling, tenderness or deformity. Normal range of motion. Cervical back: Neck supple. No muscular tenderness. Lymphadenopathy:      Cervical: No cervical adenopathy. Skin:     General: Skin is warm and dry. Coloration: Skin is not pale. Findings: No erythema or rash. Neurological:      General: No focal deficit present. Mental Status: He is alert and oriented to person, place, and time. Cranial Nerves: No cranial nerve deficit. Sensory: No sensory deficit. Deep Tendon Reflexes: Reflexes normal.   Psychiatric:         Mood and Affect: Mood normal.         Behavior: Behavior normal.         Thought Content: Thought content normal.         Judgment: Judgment normal.        Last labs reviewed. ASSESSMENT & PLAN :   Problem List          Circulatory    Multifocal atrial tachycardia (HCC)       on diltiazem since episode in the hospital.  Request that he go to see Dr. Kurtis Johnson and find out if he still needs to be on this medication. I do not think it is used for hypertension as he had no history of hypertension before his hospital admission for COPD. If he does not need it we can discontinue it and this may help his ankle edema.   If he needs it for hypertension that we could switch him to a different medication that does not have the side effect of swelling         Relevant Medications    DILT- MG extended release capsule    rosuvastatin (CRESTOR) 5 MG tablet    Other Relevant Orders    Amb External Referral To Cardiology       Respiratory    COPD (chronic obstructive pulmonary disease) ([facility] [address]       continue inhalers and follow-up with pulmonary as directed         Relevant Medications    albuterol sulfate HFA (PROAIR HFA) 108 (90 Base) MCG/ACT inhaler    ipratropium-albuterol (DUONEB) 0.5-2.5 (3) MG/3ML SOLN nebulizer solution    ADVAIR HFA 45-21 MCG/ACT inhaler    Other Relevant Orders    Amb External Referral To Cardiology    Chronic respiratory failure with hypoxia ([facility] [address]       continue oxygen 24/7, O2 saturation on 3 L is acceptable            Musculoskeletal and Integument    Lumbar arthropathy       check x-ray of his lumbar spine and may refer to Dr. Jewell Hassan if it shows significant abnormality            Other    Lumbar pain - Primary       trial meloxicam started 7.5 mg daily as needed for back pain with food and may increase to 15 mg if needed. I will see him in about a month to see if this is helping. Prescription for meloxicam given. He will also continue his gabapentin         Relevant Medications    meloxicam (MOBIC) 7.5 MG tablet    Other Relevant Orders    XR LUMBAR SPINE (2-3 VIEWS)    CK    Sedimentation Rate    Need for influenza vaccination       quadrivalent flu shot given         Relevant Orders    Influenza, FLUAD, (age 72 y+), IM, PF, 0.5 mL (Completed)    Hyperglycemia       hemoglobin A1c is good. Watch carbs and sweets in his diet         Relevant Orders    Hemoglobin A1C    Pure hypercholesterolemia       continue rosuvastatin 5 mg and diet. Check fasting lipid profile. Check CK and sed rate to make sure that the rosuvastatin is contributing to his myalgias and arthralgias.   I doubt it because he had the symptoms for long before rosuvastatin was started         Relevant Medications    DILT- MG extended release capsule    rosuvastatin (CRESTOR) 5 MG tablet    Other Relevant Orders    Comprehensive Metabolic Panel    Lipid Panel        Return in about 6 weeks (around 11/1/2022), or lumbar pain.        Darron Madden, DO  9/20/2022

## 2022-09-20 NOTE — ASSESSMENT & PLAN NOTE
check x-ray of his lumbar spine and may refer to Dr. Marge Wallis if it shows significant abnormality

## 2022-09-20 NOTE — ASSESSMENT & PLAN NOTE
on diltiazem since episode in the hospital.  Request that he go to see Dr. Debbie Back and find out if he still needs to be on this medication. I do not think it is used for hypertension as he had no history of hypertension before his hospital admission for COPD. If he does not need it we can discontinue it and this may help his ankle edema.   If he needs it for hypertension that we could switch him to a different medication that does not have the side effect of swelling

## 2022-09-21 LAB — SEDIMENTATION RATE, ERYTHROCYTE: 5 MM/HR (ref 0–15)

## 2022-10-20 PROBLEM — Z23 NEED FOR INFLUENZA VACCINATION: Status: RESOLVED | Noted: 2019-10-14 | Resolved: 2022-10-20

## 2022-11-01 ENCOUNTER — OFFICE VISIT (OUTPATIENT)
Dept: PRIMARY CARE CLINIC | Age: 67
End: 2022-11-01
Payer: MEDICARE

## 2022-11-01 VITALS
OXYGEN SATURATION: 93 % | WEIGHT: 179 LBS | DIASTOLIC BLOOD PRESSURE: 74 MMHG | SYSTOLIC BLOOD PRESSURE: 128 MMHG | HEIGHT: 67 IN | HEART RATE: 81 BPM | TEMPERATURE: 97.4 F | BODY MASS INDEX: 28.09 KG/M2

## 2022-11-01 DIAGNOSIS — M54.16 LUMBAR RADICULOPATHY: ICD-10-CM

## 2022-11-01 DIAGNOSIS — I10 ESSENTIAL HYPERTENSION: ICD-10-CM

## 2022-11-01 DIAGNOSIS — R73.9 HYPERGLYCEMIA: ICD-10-CM

## 2022-11-01 DIAGNOSIS — J43.9 PULMONARY EMPHYSEMA, UNSPECIFIED EMPHYSEMA TYPE (HCC): ICD-10-CM

## 2022-11-01 DIAGNOSIS — E78.2 MIXED HYPERLIPIDEMIA: Primary | ICD-10-CM

## 2022-11-01 PROBLEM — E78.00 PURE HYPERCHOLESTEROLEMIA: Status: RESOLVED | Noted: 2021-12-20 | Resolved: 2022-11-01

## 2022-11-01 PROCEDURE — 3078F DIAST BP <80 MM HG: CPT | Performed by: INTERNAL MEDICINE

## 2022-11-01 PROCEDURE — 99214 OFFICE O/P EST MOD 30 MIN: CPT | Performed by: INTERNAL MEDICINE

## 2022-11-01 PROCEDURE — 1123F ACP DISCUSS/DSCN MKR DOCD: CPT | Performed by: INTERNAL MEDICINE

## 2022-11-01 PROCEDURE — 3074F SYST BP LT 130 MM HG: CPT | Performed by: INTERNAL MEDICINE

## 2022-11-01 RX ORDER — ROSUVASTATIN CALCIUM 10 MG/1
10 TABLET, COATED ORAL DAILY
Qty: 90 TABLET | Refills: 1 | Status: SHIPPED | OUTPATIENT
Start: 2022-11-01

## 2022-11-01 RX ORDER — FLUTICASONE PROPIONATE AND SALMETEROL XINAFOATE 45; 21 UG/1; UG/1
2 AEROSOL, METERED RESPIRATORY (INHALATION) 2 TIMES DAILY
Qty: 1 EACH | Refills: 5 | Status: SHIPPED | OUTPATIENT
Start: 2022-11-01

## 2022-11-01 RX ORDER — GABAPENTIN 300 MG/1
300 CAPSULE ORAL 3 TIMES DAILY
Qty: 270 CAPSULE | Refills: 1 | Status: SHIPPED | OUTPATIENT
Start: 2022-11-01 | End: 2023-04-30

## 2022-11-01 ASSESSMENT — ENCOUNTER SYMPTOMS
WHEEZING: 0
COUGH: 0
STRIDOR: 0
RHINORRHEA: 0
PHOTOPHOBIA: 0
FACIAL SWELLING: 0
EYE ITCHING: 0
DIARRHEA: 0
TROUBLE SWALLOWING: 0
BACK PAIN: 1
EYE DISCHARGE: 0
ABDOMINAL PAIN: 0
SHORTNESS OF BREATH: 1
COLOR CHANGE: 0
VOMITING: 0
EYE PAIN: 0
ANAL BLEEDING: 0
SORE THROAT: 0
BLOOD IN STOOL: 0
CONSTIPATION: 0
NAUSEA: 0

## 2022-11-01 ASSESSMENT — COPD QUESTIONNAIRES: COPD: 1

## 2022-11-01 NOTE — ASSESSMENT & PLAN NOTE
blood pressure at goal, well controlled on current medications.   Continue medications and monitor blood pressures let them know if they go over 595S systolic or over 86 diastolic L foot pain and swelling

## 2022-11-01 NOTE — ASSESSMENT & PLAN NOTE
prescription for Advair given.   He was reminded to keep his appointment with his pulmonologist this year

## 2022-11-01 NOTE — ASSESSMENT & PLAN NOTE
discussed physical therapy referral to orthopedic. Patient defers for now. He has some exercises that he was given from previous physical therapist and will continue to do his back exercises and core exercises.   Continue his meloxicam on a as needed basis and when it gets bad enough that he wants to have physical therapy or see a back doctor we will obtain an MRI of his spine and make the appropriate referrals

## 2022-11-01 NOTE — ASSESSMENT & PLAN NOTE
not at goal.  Increase his rosuvastatin to 10 mg at bedtime, prescription given.   Watch diet and will recheck lipids next office visit

## 2022-11-01 NOTE — PROGRESS NOTES
2022    Name: Trice Delcid : 1955 Sex: male  Age: 69 y.o. Subjective:  Chief Complaint   Patient presents with    Back Pain        Hypertension  Associated symptoms include shortness of breath. Pertinent negatives include no chest pain, headaches or palpitations. COPD  He complains of shortness of breath. There is no cough or wheezing. Pertinent negatives include no appetite change, chest pain, ear pain, headaches, myalgias, rhinorrhea, sore throat or trouble swallowing. Back Pain  Pertinent negatives include no abdominal pain, chest pain, dysuria, headaches, numbness or weakness. Patient complains of chronic low back pain, sometimes radiating down his legs and the back left more so than right. No problems with bowel or bladder incontinence. No buckling of his legs. The pain is worse sometimes when he sitting in a chair or lying down. We started him on as needed meloxicam which she says helps somewhat. He also had x-rays of his lumbar spine which revealed degenerative disc disease at L4 and L5 he also had mild anterolisthesis of L4 on L5. Personally reviewed the x-rays    Patient had his umbilical hernia repair done in February and did well. He had pulmonary clearance from Dr. Paulie Landa. Patient has a history of COPD . He also has chronic hypoxic respiratory failure on oxygen 24/7. When previously seen at pulmonary office he failed his  6-minute walk test.  . He stays on his oxygen 24/7    He denies any fever, chills, nausea or vomiting. He still has a cough but that is chronic for him. CT scan of his chest last year revealed spiculated lesion in the right lung which is unchanged. They thought it might be scarring from his previous pneumonia. Repeat CT scan of his chest in 2021 showed a stable 15 mm right upper lobe pulmonary nodule dating back to 2018, chronic appearing volume loss right upper lobe and lingula most probably secondary to scarring.   Rest of the lung robbins remain clear. Brian Xiao CT scan of his lung in April 2022 revealed stable nodule of 15 mm of the right upper lobe. He saw Dr. Kailyn Villarreal after that. I reviewed his evaluation and recommendations. He has a history of multifocal atrial tachycardia while he was in the hospital.  He was placed on diltiazem and he has noticed that his ankles are more swollen at the end of the day. No worsening shortness of breath. He has not seen his cardiologist in over a year. I told him to call Dr. Yann Hammonds office and make an appointment for follow-up. I wonder if his swelling in his ankles is due to the diltiazem. If he does not need it for blood pressure control this could probably be discontinued but I would like cardiology input. If it is for blood pressure control may switch him to a different antihypertensive      He complains of pain in his feet and calves when he walks. Relieved by rest.  He noticed that the soles of his feet are very sensitive but he says that it has been that way since he was a child. Because of his previous history of nicotine use, will need to make sure he does not have peripheral vascular disease. He had an ultrasound of his abdominal aorta to rule out aortic aneurysm because he was a heavy smoker. No sign of any aneurysm. DL was done which showed some possibility of decreased blood flow to both lower extremities. CTA of his abdominal aorta with runoff was done which showed no evidence of aortic aneurysm and good blood flow to his legs. I think he might have some element of lumbar radiculopathy. He complains of numbness and tingling and pain of his legs. We started him on gabapentin his last visit and he is going to increase it to 300 mg a day then he will start taking 300 mg twice a day and then after a while take 300 milligrams 3 times a day. He was scheduled for nerve conduction test with a never got them done. Tiffani Parks  His leg pain has improved tremendously since being on the gabapentin. Blood work in September 2022 was reviewed. Lipids still not at goal with total cholesterol 227, triglycerides 152 and LDL cholesterol 148. He is on rosuvastatin 5 mg a day. Fasting blood sugar was a little elevated at 104. Liver and kidney blood work was normal.  Hemoglobin A1c is trending up at 5.8%. He finished his COVID-19 vaccination series. He needs his fourth shot. He is up-to-date on his pneumonia vaccinations, and Tdap. He had his Shingrix series at [facility] in [place] and will get a copy of the report. He had his flu shot      Review of Systems   Constitutional:  Negative for appetite change, fatigue and unexpected weight change. HENT:  Negative for congestion, ear pain, facial swelling, rhinorrhea, sore throat, tinnitus and trouble swallowing. Eyes:  Negative for photophobia, pain, discharge, itching and visual disturbance. Respiratory:  Positive for shortness of breath. Negative for cough, wheezing and stridor. Going up and down steps   Cardiovascular:  Positive for leg swelling. Negative for chest pain and palpitations. Gastrointestinal:  Negative for abdominal pain, anal bleeding, blood in stool, constipation, diarrhea, nausea and vomiting. Endocrine: Negative for cold intolerance, heat intolerance, polydipsia, polyphagia and polyuria. Genitourinary:  Negative for difficulty urinating, dysuria, flank pain, frequency, hematuria and urgency. Musculoskeletal:  Positive for arthralgias and back pain. Negative for gait problem, joint swelling and myalgias. Skin:  Negative for color change, pallor and rash. Allergic/Immunologic: Negative for environmental allergies and food allergies. Neurological:  Negative for dizziness, tremors, seizures, syncope, speech difficulty, weakness, light-headedness, numbness and headaches. Left side   Hematological:  Negative for adenopathy. Does not bruise/bleed easily.    Psychiatric/Behavioral:  Negative for agitation, behavioral problems, confusion, sleep disturbance and suicidal ideas. The patient is not nervous/anxious. Current Outpatient Medications:     gabapentin (NEURONTIN) 300 MG capsule, Take 1 capsule by mouth 3 times daily for 180 days.  Intended supply: 90 days, Disp: 270 capsule, Rfl: 1    ADVAIR HFA 45-21 MCG/ACT inhaler, Inhale 2 puffs into the lungs 2 times daily, Disp: 1 each, Rfl: 5    rosuvastatin (CRESTOR) 10 MG tablet, Take 1 tablet by mouth daily, Disp: 90 tablet, Rfl: 1    meloxicam (MOBIC) 7.5 MG tablet, Take 1 tablet by mouth daily as needed for Pain, Disp: 30 tablet, Rfl: 5    OXYGEN, Inhale 2 L into the lungs as needed, Disp: , Rfl:     DILT- MG extended release capsule, Take 1 capsule by mouth daily, Disp: 90 capsule, Rfl: 3    albuterol sulfate HFA (PROAIR HFA) 108 (90 Base) MCG/ACT inhaler, Inhale 2 puffs into the lungs every 6 hours as needed for Wheezing Inhale into the lungs, Disp: 18 g, Rfl: 3    ipratropium-albuterol (DUONEB) 0.5-2.5 (3) MG/3ML SOLN nebulizer solution, Take 3 mLs by nebulization every 6 hours as needed for Shortness of Breath, Disp: 360 mL, Rfl: 3     No Known Allergies     Past Medical History:   Diagnosis Date    Alcohol abuse     Arthritis     COPD (chronic obstructive pulmonary disease) (HCC)     Hematochezia due to medication 12/9/2019    Multifocal atrial tachycardia (HCC)     paroxysmal    Nicotine dependence     Nodule of right lung        Health Maintenance Due   Topic Date Due    COVID-19 Vaccine (4 - Booster for Moderna series) 01/28/2022        Patient Active Problem List   Diagnosis    Multifocal atrial tachycardia (HCC)    Lumbar arthropathy    Lumbar radiculopathy    Nodule of upper lobe of left lung    COPD (chronic obstructive pulmonary disease) (HCC)    Essential hypertension    Hyperglycemia    Localized edema    Chronic respiratory failure with hypoxia (HCC)    Cervicalgia    Lumbar pain    Mixed hyperlipidemia        Past Surgical History:   Procedure Laterality Date    COLONOSCOPY      TONSILLECTOMY      UMBILICAL HERNIA REPAIR N/A 1866    OPEN UMBILICAL HERNIA REPAIR WITH MESH performed by Jelly Louise DO at [facility] OR        Family History   Problem Relation Age of Onset    No Known Problems Mother     Heart Attack Father         Social History     Tobacco Use    Smoking status: Former     Packs/day: 1.50     Years: 45.00     Pack years: 67.50     Types: Cigarettes     Start date: 1973     Quit date: 2018     Years since quittin.8    Smokeless tobacco: Never   Vaping Use    Vaping Use: Never used   Substance Use Topics    Alcohol use: Yes     Alcohol/week: 4.0 standard drinks     Types: 4 Cans of beer per week    Drug use: Never        Objective  Vitals:    22 0927   BP: 128/74   Pulse: 81   Temp: 97.4 °F (36.3 °C)   SpO2: 93%   Weight: 179 lb (81.2 kg)   Height: 5' 7\" (1.702 m)        Exam:  Physical Exam  Vitals reviewed. Constitutional:       General: He is not in acute distress. Appearance: Normal appearance. He is well-developed and normal weight. He is not ill-appearing. Comments: Oxygen per nasal cannula at 2 L   HENT:      Head: Normocephalic and atraumatic. Right Ear: Tympanic membrane, ear canal and external ear normal. There is no impacted cerumen. Left Ear: Tympanic membrane, ear canal and external ear normal. There is no impacted cerumen. Eyes:      General: No scleral icterus. Right eye: No discharge. Left eye: No discharge. Conjunctiva/sclera: Conjunctivae normal.      Pupils: Pupils are equal, round, and reactive to light. Neck:      Thyroid: No thyromegaly. Vascular: No carotid bruit. Comments: He has decreased range of motion to sidebending with tenderness in the suboccipital area and some muscle spasms on his posterior  sternocleidomastoid muscle  Cardiovascular:      Rate and Rhythm: Normal rate and regular rhythm.       Heart sounds: Normal heart sounds. No murmur heard. No friction rub. No gallop. Comments: Peripheral pulses are lightly decreased, DP and PT  Pulmonary:      Effort: Pulmonary effort is normal. No respiratory distress. Breath sounds: Decreased breath sounds present. No wheezing or rales. Comments: Breath sounds are markedly decreased bilaterally  Chest:      Chest wall: No tenderness. Abdominal:      General: Bowel sounds are normal. There is no distension. Palpations: Abdomen is soft. There is no mass. Tenderness: There is no abdominal tenderness. There is no guarding or rebound. Hernia: No hernia is present. Comments: Well-healed midline infraumbilical scar with no evidence of infection. Musculoskeletal:         General: No swelling, tenderness or deformity. Normal range of motion. Cervical back: Neck supple. No muscular tenderness. Lymphadenopathy:      Cervical: No cervical adenopathy. Skin:     General: Skin is warm and dry. Coloration: Skin is not pale. Findings: No erythema or rash. Neurological:      General: No focal deficit present. Mental Status: He is alert and oriented to person, place, and time. Cranial Nerves: No cranial nerve deficit. Sensory: No sensory deficit. Deep Tendon Reflexes: Reflexes normal.   Psychiatric:         Mood and Affect: Mood normal.         Behavior: Behavior normal.         Thought Content: Thought content normal.         Judgment: Judgment normal.        Last labs reviewed. ASSESSMENT & PLAN :   Problem List          Circulatory    Essential hypertension       blood pressure at goal, well controlled on current medications. Continue medications and monitor blood pressures let them know if they go over 932W systolic or over 86 diastolic            Respiratory    COPD (chronic obstructive pulmonary disease) ([facility] [address]       prescription for Advair given.   He was reminded to keep his appointment with his pulmonologist this year         Relevant Medications    albuterol sulfate HFA (PROAIR HFA) 108 (90 Base) MCG/ACT inhaler    ipratropium-albuterol (DUONEB) 0.5-2.5 (3) MG/3ML SOLN nebulizer solution    ADVAIR HFA 45-21 MCG/ACT inhaler       Nervous and Auditory    Lumbar radiculopathy       discussed physical therapy referral to orthopedic. Patient defers for now. He has some exercises that he was given from previous physical therapist and will continue to do his back exercises and core exercises. Continue his meloxicam on a as needed basis and when it gets bad enough that he wants to have physical therapy or see a back doctor we will obtain an MRI of his spine and make the appropriate referrals         Relevant Medications    gabapentin (NEURONTIN) 300 MG capsule       Other    Mixed hyperlipidemia - Primary       not at goal.  Increase his rosuvastatin to 10 mg at bedtime, prescription given. Watch diet and will recheck lipids next office visit         Relevant Medications    DILT- MG extended release capsule    rosuvastatin (CRESTOR) 10 MG tablet    Hyperglycemia       watch carbs and sweets in his diet and will recheck his fasting blood sugar next office visit             Return in about 3 months (around 2/1/2023), or HL COPD be fasting.        Flip Mendoza,   11/1/2022

## 2022-12-19 DIAGNOSIS — I47.1 MULTIFOCAL ATRIAL TACHYCARDIA (HCC): ICD-10-CM

## 2022-12-19 RX ORDER — DILTIAZEM HYDROCHLORIDE 120 MG/1
120 CAPSULE, EXTENDED RELEASE ORAL DAILY
Qty: 90 CAPSULE | Refills: 3 | Status: SHIPPED | OUTPATIENT
Start: 2022-12-19 | End: 2023-12-14

## 2022-12-19 NOTE — TELEPHONE ENCOUNTER
Name of Medication(s) Requested:  [address] Requested:   [employer]    Medication(s) pended? [x] Yes  [] No    Last Appointment:  11/1/2022    Future appts:  Future Appointments   Date Time Provider Adela Petersen   12/27/2022  9:00  S Danielle Peoples   2/1/2023  9:00 AM [address] [facility], DO [facility]   5/16/2023 11:00 AM Ilene Reyes MD [facility]          Does patient need call back?   [] Yes  [x] No

## 2022-12-20 DIAGNOSIS — J43.9 PULMONARY EMPHYSEMA, UNSPECIFIED EMPHYSEMA TYPE (HCC): ICD-10-CM

## 2022-12-20 RX ORDER — IPRATROPIUM BROMIDE AND ALBUTEROL SULFATE 2.5; .5 MG/3ML; MG/3ML
1 SOLUTION RESPIRATORY (INHALATION) EVERY 6 HOURS PRN
Qty: 360 ML | Refills: 3 | Status: SHIPPED | OUTPATIENT
Start: 2022-12-20

## 2022-12-20 NOTE — TELEPHONE ENCOUNTER
Last Appointment:  11/1/2022  Future Appointments   Date Time Provider Adela Humphriesi   12/27/2022  9:00 AM [address] [place] [address], DO [facility]   2/1/2023  9:00 AM [address] [place] [address], DO [facility]   5/16/2023 11:00 AM Vivien Espinal MD [facility]      Patient needs pended med refilled.     Electronically signed by Akin Alvarez LPN on [id number]/83/9967 at 2:26 PM

## 2022-12-27 ENCOUNTER — OFFICE VISIT (OUTPATIENT)
Dept: PRIMARY CARE CLINIC | Age: 67
End: 2022-12-27
Payer: MEDICARE

## 2022-12-27 VITALS
HEIGHT: 67 IN | OXYGEN SATURATION: 94 % | DIASTOLIC BLOOD PRESSURE: 70 MMHG | BODY MASS INDEX: 27.94 KG/M2 | TEMPERATURE: 97.7 F | SYSTOLIC BLOOD PRESSURE: 130 MMHG | HEART RATE: 81 BPM | WEIGHT: 178 LBS

## 2022-12-27 DIAGNOSIS — G89.29 CHRONIC LEFT SHOULDER PAIN: ICD-10-CM

## 2022-12-27 DIAGNOSIS — M25.512 CHRONIC LEFT SHOULDER PAIN: ICD-10-CM

## 2022-12-27 DIAGNOSIS — Z00.00 MEDICARE ANNUAL WELLNESS VISIT, SUBSEQUENT: Primary | ICD-10-CM

## 2022-12-27 DIAGNOSIS — G89.29 OTHER CHRONIC PAIN: ICD-10-CM

## 2022-12-27 DIAGNOSIS — M54.2 CERVICALGIA: ICD-10-CM

## 2022-12-27 PROCEDURE — 3074F SYST BP LT 130 MM HG: CPT | Performed by: INTERNAL MEDICINE

## 2022-12-27 PROCEDURE — 99213 OFFICE O/P EST LOW 20 MIN: CPT | Performed by: INTERNAL MEDICINE

## 2022-12-27 PROCEDURE — 3078F DIAST BP <80 MM HG: CPT | Performed by: INTERNAL MEDICINE

## 2022-12-27 PROCEDURE — 1123F ACP DISCUSS/DSCN MKR DOCD: CPT | Performed by: INTERNAL MEDICINE

## 2022-12-27 PROCEDURE — G0439 PPPS, SUBSEQ VISIT: HCPCS | Performed by: INTERNAL MEDICINE

## 2022-12-27 ASSESSMENT — PATIENT HEALTH QUESTIONNAIRE - PHQ9
SUM OF ALL RESPONSES TO PHQ QUESTIONS 1-9: 0
SUM OF ALL RESPONSES TO PHQ QUESTIONS 1-9: 0
SUM OF ALL RESPONSES TO PHQ9 QUESTIONS 1 & 2: 0
1. LITTLE INTEREST OR PLEASURE IN DOING THINGS: 0
SUM OF ALL RESPONSES TO PHQ QUESTIONS 1-9: 0
SUM OF ALL RESPONSES TO PHQ QUESTIONS 1-9: 0
2. FEELING DOWN, DEPRESSED OR HOPELESS: 0

## 2022-12-27 ASSESSMENT — LIFESTYLE VARIABLES
HOW OFTEN DO YOU HAVE A DRINK CONTAINING ALCOHOL: 2-3 TIMES A WEEK
HOW MANY STANDARD DRINKS CONTAINING ALCOHOL DO YOU HAVE ON A TYPICAL DAY: 1 OR 2

## 2022-12-27 NOTE — PATIENT INSTRUCTIONS
Chronic Pain: Care Instructions  Your Care Instructions     Chronic pain is pain that lasts a long time (months or even years) and may or may not have a clear cause. It is different from acute pain, which usually does have a clear cause--like an injury or illness--and gets better over time. Chronic pain:  Lasts over time but may vary from day to day. Does not go away despite efforts to end it. May disrupt your sleep and lead to fatigue. May cause depression or anxiety. May make your muscles tense, causing more pain. Can disrupt your work, hobbies, home life, and relationships with friends and family. Chronic pain is a very real condition. It is not just in your head. Treatment can help and usually includes several methods used together, such as medicines, physical therapy, exercise, and other treatments. Learning how to relax and changing negative thought patterns can also help you cope. Chronic pain is complex. Taking an active role in your treatment will help you better manage your pain. Tell your doctor if you have trouble dealing with your pain. You may have to try several things before you find what works best for you. Follow-up care is a key part of your treatment and safety. Be sure to make and go to all appointments, and call your doctor if you are having problems. It's also a good idea to know your test results and keep a list of the medicines you take. How can you care for yourself at home? Pace yourself. Break up large jobs into smaller tasks. Save harder tasks for days when you have less pain, or go back and forth between hard tasks and easier ones. Take rest breaks. Relax, and reduce stress. Relaxation techniques such as deep breathing or meditation can help. Keep moving. Gentle, daily exercise can help reduce pain over the long run. Try low- or no-impact exercises such as walking, swimming, and stationary biking. Do stretches to stay flexible. Try heat, cold packs, and massage.   Get enough sleep. Chronic pain can make you tired and drain your energy. Talk with your doctor if you have trouble sleeping because of pain. Think positive. Your thoughts can affect your pain level. Do things that you enjoy to distract yourself when you have pain instead of focusing on the pain. See a movie, read a book, listen to music, or spend time with a friend. If you think you are depressed, talk to your doctor about treatment. Keep a daily pain diary. Record how your moods, thoughts, sleep patterns, activities, and medicine affect your pain. You may find that your pain is worse during or after certain activities or when you are feeling a certain emotion. Having a record of your pain can help you and your doctor find the best ways to treat your pain. Take pain medicines exactly as directed. If the doctor gave you a prescription medicine for pain, take it as prescribed. If you are not taking a prescription pain medicine, ask your doctor if you can take an over-the-counter medicine. Reducing constipation caused by pain medicine  Talk to your doctor about a laxative. If a laxative doesn't work, your doctor may suggest a prescription medicine. Include fruits, vegetables, beans, and whole grains in your diet each day. These foods are high in fiber. If your doctor recommends it, get more exercise. Walking is a good choice. Bit by bit, increase the amount you walk every day. Try for at least 30 minutes on most days of the week. Schedule time each day for a bowel movement. A daily routine may help. Take your time and do not strain when having a bowel movement. When should you call for help? Call your doctor now or seek immediate medical care if:    Your pain gets worse or is out of control. You feel down or blue, or you do not enjoy things like you once did. You may be depressed, which is common in people with chronic pain. Depression can be treated. You have vomiting or cramps for more than 2 hours. Watch closely for changes in your health, and be sure to contact your doctor if:    You cannot sleep because of pain. You are very worried or anxious about your pain. You have trouble taking your pain medicine. You have any concerns about your pain medicine. You have trouble with bowel movements, such as:  No bowel movement in 3 days. Blood in the anal area, in your stool, or on the toilet paper. Diarrhea for more than 24 hours. Where can you learn more? Go to http://www.woods.com/ and enter N004 to learn more about \"Chronic Pain: Care Instructions. \"  Current as of: February 23, 2022               Content Version: 13.5  © 2006-2022 [employer]. Care instructions adapted under license by [facility] [employer] [facility] ([facility]). If you have questions about a medical condition or this instruction, always ask your healthcare professional. [place] [address] any warranty or liability for your use of this information. Preventing Falls: Care Instructions  Overview     Getting around your home safely can be a challenge if you have injuries or health problems that make it easy for you to fall. Loose rugs and furniture in walkways are among the dangers for many older people who have problems walking or who have poor eyesight. People who have conditions such as arthritis, osteoporosis, or dementia also have to be careful not to fall. You can make your home safer with a few simple measures. Follow-up care is a key part of your treatment and safety. Be sure to make and go to all appointments, and call your doctor if you are having problems. It's also a good idea to know your test results and keep a list of the medicines you take. How can you care for yourself at home? Taking care of yourself  Exercise regularly to improve your strength, muscle tone, and balance. Walk if you can. Swimming may be a good choice if you cannot walk easily.   Have your vision and hearing checked each year or any time you notice a change. If you have trouble seeing and hearing, you might not be able to avoid objects and could lose your balance. Know the side effects of the medicines you take. Ask your doctor or pharmacist whether the medicines you take can affect your balance. Sleeping pills or sedatives can affect your balance. Limit the amount of alcohol you drink. Alcohol can impair your balance and other senses. Ask your doctor whether calluses or corns on your feet need to be removed. If you wear loose-fitting shoes because of calluses or corns, you can lose your balance and fall. Talk to your doctor if you have numbness in your feet. You may get dizzy if you do not drink enough water. To prevent dehydration, drink plenty of fluids. Choose water and other clear liquids. If you have kidney, heart, or liver disease and have to limit fluids, talk with your doctor before you increase the amount of fluids you drink. Preventing falls at home  Remove raised doorway thresholds, throw rugs, and clutter. Repair loose carpet or raised areas in the floor. Move furniture and electrical cords to keep them out of walking paths. Use nonskid floor wax, and wipe up spills right away, especially on ceramic tile floors. If you use a walker or cane, put rubber tips on it. If you use crutches, clean the bottoms of them regularly with an abrasive pad, such as steel wool. Keep your house well lit, especially stairways, porches, and outside walkways. Use night-lights in areas such as hallways and bathrooms. Add extra light switches or use remote switches (such as switches that go on or off when you clap your hands) to make it easier to turn lights on if you have to get up during the night. Install sturdy handrails on stairways. Move items in your cabinets so that the things you use a lot are on the lower shelves (about waist level). Keep a cordless phone and a flashlight with new batteries by your bed.  If possible, put a phone in each of the main rooms of your house, or carry a cell phone in case you fall and cannot reach a phone. Or, you can wear a device around your neck or wrist. You push a button that sends a signal for help. Wear low-heeled shoes that fit well and give your feet good support. Use footwear with nonskid soles. Check the heels and soles of your shoes for wear. Repair or replace worn heels or soles. Do not wear socks without shoes on smooth floors, such as wood. Walk on the grass when the sidewalks are slippery. If you live in an area that gets snow and ice in the winter, sprinkle salt on slippery steps and sidewalks. Or ask a family member or friend to do this for you. Preventing falls in the bath  Install grab bars and nonskid mats inside and outside your shower or tub and near the toilet and sinks. Use shower chairs and bath benches. Use a hand-held shower head that will allow you to sit while showering. Get into a tub or shower by putting the weaker leg in first. Get out of a tub or shower with your strong side first.  Repair loose toilet seats and consider installing a raised toilet seat to make getting on and off the toilet easier. Keep your bathroom door unlocked while you are in the shower. Where can you learn more? Go to http://www.ortiz.com/ and enter G117 to learn more about \"Preventing Falls: Care Instructions. \"  Current as of: May 4, 2022               Content Version: 13.5  © 4498-3781 Health[employer]. Care instructions adapted under license by [facility] ([facility] [place]). If you have questions about a medical condition or this instruction, always ask your healthcare professional. [place] [address] any warranty or liability for your use of this information. Advance Directives: Care Instructions  Overview  An advance directive is a legal way to state your wishes at the end of your life.  It tells your family and your doctor what to do if you can't say what you want. There are two main types of advance directives. You can change them any time your wishes change. Living will. This form tells your family and your doctor your wishes about life support and other treatment. The form is also called a declaration. Medical power of . This form lets you name a person to make treatment decisions for you when you can't speak for yourself. This person is called a health care agent (health care proxy, health care surrogate). The form is also called a durable power of  for health care. If you do not have an advance directive, decisions about your medical care may be made by a family member, or by a doctor or a  who doesn't know you. It may help to think of an advance directive as a gift to the people who care for you. If you have one, they won't have to make tough decisions by themselves. For more information, including forms for your state, see the [address] website (www.caringinfo.org/planning/advance-directives/). Follow-up care is a key part of your treatment and safety. Be sure to make and go to all appointments, and call your doctor if you are having problems. It's also a good idea to know your test results and keep a list of the medicines you take. What should you include in an advance directive? Many states have a unique advance directive form. (It may ask you to address specific issues.) Or you might use a universal form that's approved by many states. If your form doesn't tell you what to address, it may be hard to know what to include in your advance directive. Use the questions below to help you get started. Who do you want to make decisions about your medical care if you are not able to? What life-support measures do you want if you have a serious illness that gets worse over time or can't be cured? What are you most afraid of that might happen?  (Maybe you're afraid of having pain, losing your independence, or being kept alive by machines.)  Where would you prefer to die? (Your home? A hospital? A nursing home?)  Do you want to donate your organs when you die? Do you want certain Religion practices performed before you die? When should you call for help? Be sure to contact your doctor if you have any questions. Where can you learn more? Go to http://www.ortiz.com/ and enter R264 to learn more about \"Advance Directives: Care Instructions. \"  Current as of: June 16, 2022               Content Version: 13.5  © 6960-6308 [employer]. Care instructions adapted under license by [facility] ([facility]). If you have questions about a medical condition or this instruction, always ask your healthcare professional. [address] any warranty or liability for your use of this information. A Healthy Heart: Care Instructions  Your Care Instructions     Coronary artery disease, also called heart disease, occurs when a substance called plaque builds up in the vessels that supply oxygen-rich blood to your heart muscle. This can narrow the blood vessels and reduce blood flow. A heart attack happens when blood flow is completely blocked. A high-fat diet, smoking, and other factors increase the risk of heart disease. Your doctor has found that you have a chance of having heart disease. You can do lots of things to keep your heart healthy. It may not be easy, but you can change your diet, exercise more, and quit smoking. These steps really work to lower your chance of heart disease. Follow-up care is a key part of your treatment and safety. Be sure to make and go to all appointments, and call your doctor if you are having problems. It's also a good idea to know your test results and keep a list of the medicines you take. How can you care for yourself at home? Diet    Use less salt when you cook and eat. This helps lower your blood pressure. Taste food before salting.  Add only a little salt when you think you need it. With time, your taste buds will adjust to less salt. Eat fewer snack items, fast foods, canned soups, and other high-salt, high-fat, processed foods. Read food labels and try to avoid saturated and trans fats. They increase your risk of heart disease by raising cholesterol levels. Limit the amount of solid fat-butter, margarine, and shortening-you eat. Use olive, peanut, or canola oil when you cook. Bake, broil, and steam foods instead of frying them. Eat a variety of fruit and vegetables every day. Dark green, deep orange, red, or yellow fruits and vegetables are especially good for you. Examples include spinach, carrots, peaches, and berries. Foods high in fiber can reduce your cholesterol and provide important vitamins and minerals. High-fiber foods include whole-grain cereals and breads, oatmeal, beans, brown rice, citrus fruits, and apples. Eat lean proteins. Heart-healthy proteins include seafood, lean meats and poultry, eggs, beans, peas, nuts, seeds, and soy products. Limit drinks and foods with added sugar. These include candy, desserts, and soda pop. Lifestyle changes    If your doctor recommends it, get more exercise. Walking is a good choice. Bit by bit, increase the amount you walk every day. Try for at least 30 minutes on most days of the week. You also may want to swim, bike, or do other activities. Do not smoke. If you need help quitting, talk to your doctor about stop-smoking programs and medicines. These can increase your chances of quitting for good. Quitting smoking may be the most important step you can take to protect your heart. It is never too late to quit. Limit alcohol to 2 drinks a day for men and 1 drink a day for women. Too much alcohol can cause health problems. Manage other health problems such as diabetes, high blood pressure, and high cholesterol.  If you think you may have a problem with alcohol or drug use, talk to your doctor. Medicines    Take your medicines exactly as prescribed. Call your doctor if you think you are having a problem with your medicine. If your doctor recommends aspirin, take the amount directed each day. Make sure you take aspirin and not another kind of pain reliever, such as acetaminophen (Tylenol). When should you call for help? Call 911 if you have symptoms of a heart attack. These may include:    Chest pain or pressure, or a strange feeling in the chest.     Sweating. Shortness of breath. Pain, pressure, or a strange feeling in the back, neck, jaw, or upper belly or in one or both shoulders or arms. Lightheadedness or sudden weakness. A fast or irregular heartbeat. After you call 911, the  may tell you to chew 1 adult-strength or 2 to 4 low-dose aspirin. Wait for an ambulance. Do not try to drive yourself. Watch closely for changes in your health, and be sure to contact your doctor if you have any problems. Where can you learn more? Go to http://www.ortiz.com/ and enter F075 to learn more about \"A Healthy Heart: Care Instructions. \"  Current as of: September 7, 2022               Content Version: 13.5  © 2006-2022 Healthwise, [employer]. Care instructions adapted under license by [facility] ([facility]). If you have questions about a medical condition or this instruction, always ask your healthcare professional. [place] [address] any warranty or liability for your use of this information. Personalized Preventive Plan for Lawerence Burkitt - 12/27/2022  Medicare offers a range of preventive health benefits. Some of the tests and screenings are paid in full while other may be subject to a deductible, co-insurance, and/or copay. Some of these benefits include a comprehensive review of your medical history including lifestyle, illnesses that may run in your family, and various assessments and screenings as appropriate.     After reviewing your medical record and screening and assessments performed today your provider may have ordered immunizations, labs, imaging, and/or referrals for you. A list of these orders (if applicable) as well as your Preventive Care list are included within your After Visit Summary for your review. Other Preventive Recommendations:    A preventive eye exam performed by an eye specialist is recommended every 1-2 years to screen for glaucoma; cataracts, macular degeneration, and other eye disorders. A preventive dental visit is recommended every 6 months. Try to get at least 150 minutes of exercise per week or 10,000 steps per day on a pedometer . Order or download the FREE \"Exercise & Physical Activity: Your Everyday Guide\" from [employer]. Call 8-141.562.8472 or search [employer] online. You need 6576-7112 mg of calcium and 5310-3393 IU of vitamin D per day. It is possible to meet your calcium requirement with diet alone, but a vitamin D supplement is usually necessary to meet this goal.  When exposed to the sun, use a sunscreen that protects against both UVA and UVB radiation with an SPF of 30 or greater. Reapply every 2 to 3 hours or after sweating, drying off with a towel, or swimming. Always wear a seat belt when traveling in a car. Always wear a helmet when riding a bicycle or motorcycle.

## 2022-12-27 NOTE — PROGRESS NOTES
Medicare Annual Wellness Visit    Bro Wolff is here for Medicare AWV    Assessment & Plan     Neck and shoulder pain on left  X ray of cervical spine  and left shoulder  Continue Meloxicam as needed     Recommendations for Preventive Services Due: see orders and patient instructions/AVS.  Recommended screening schedule for the next 5-10 years is provided to the patient in written form: see Patient Instructions/AVS.     O v on 2/1/2023     Subjective   The following acute and/or chronic problems were also addressed today:  Left neck and shoulder pain    Patient's complete Health Risk Assessment and screening values have been reviewed and are found in Flowsheets. The following problems were reviewed today and where indicated follow up appointments were made and/or referrals ordered. Positive Risk Factor Screenings with Interventions:               General HRA Questions:  Select all that apply: (!) New or Increased Pain (shoulder)    Pain Interventions: Take Meloxicam as directed, watch for stomach pain       Weight and Activity:  Physical Activity: Inactive    Days of Exercise per Week: 0 days    Minutes of Exercise per Session: 0 min     On average, how many days per week do you engage in moderate to strenuous exercise (like a brisk walk)?: 0 days  Have you lost any weight without trying in the past 3 months?: No  Body mass index: (!) 27.88      Inactivity Interventions:  Patient declined any further interventions or treatment         Safety:  Do you have any tripping hazards - clutter in doorways, halls, or stairs?: (!) Yes  Do you always fasten your seatbelt when you are in a car?: (!) No    Interventions:  Home safety tips given                     Objective   Vitals:    12/27/22 0907   BP: 130/70   Pulse: 81   Temp: 97.7 °F (36.5 °C)   SpO2: 94%   Weight: 178 lb (80.7 kg)   Height: 5' 7\" (1.702 m)      Body mass index is 27.88 kg/m².       General Appearance: alert and oriented to person, place and time, well developed and well- nourished, in no acute distress  Skin: warm and dry, no rash or erythema  Head: normocephalic and atraumatic  Eyes: pupils equal, round, and reactive to light, extraocular eye movements intact, conjunctivae normal  ENT: tympanic membrane, external ear and ear canal normal bilaterally, nose without deformity, nasal mucosa and turbinates normal without polyps  Neck: supple and non-tender without mass, no thyromegaly or thyroid nodules, no cervical lymphadenopathy  Cardiovascular: normal rate, regular rhythm, normal S1 and S2, no murmurs, rubs, clicks, or gallops, distal pulses intact, no carotid bruits  Lungs Decrease breath sounds, no rales or ronchi  Abdomen: soft, non-tender, non-distended, normal bowel sounds, no masses or organomegaly  Extremities: no cyanosis, clubbing or edema  Musculoskeletal: normal range of motion, no joint swelling, deformity . Mild tenderness on palpation left side of neck and left AC joint. Clicking sound left shoulder on elevation of left arm  Neurologic: reflexes normal and symmetric, no cranial nerve deficit, gait, coordination and speech normal       No Known Allergies  Prior to Visit Medications    Medication Sig Taking? Authorizing Provider   ipratropium-albuterol (DUONEB) 0.5-2.5 (3) MG/3ML SOLN nebulizer solution Take 3 mLs by nebulization every 6 hours as needed for Shortness of Breath Yes Davida Brambila DO   DILT- MG extended release capsule Take 1 capsule by mouth daily Yes Davida Brambila DO   gabapentin (NEURONTIN) 300 MG capsule Take 1 capsule by mouth 3 times daily for 180 days.  Intended supply: 90 days Yes Davida Brambila DO   ADVAIR HFA 45-21 MCG/ACT inhaler Inhale 2 puffs into the lungs 2 times daily Yes Davida Brambila DO   rosuvastatin (CRESTOR) 10 MG tablet Take 1 tablet by mouth daily Yes Davida Brambila DO   meloxicam (MOBIC) 7.5 MG tablet Take 1 tablet by mouth daily as needed for Pain Yes Davida Brambila DO   OXYGEN Inhale 2 L into the lungs as needed Yes Historical Provider, MD   albuterol sulfate HFA (PROAIR HFA) 108 (90 Base) MCG/ACT inhaler Inhale 2 puffs into the lungs every 6 hours as needed for Wheezing Inhale into the lungs Yes Davida Brambila DO   gabapentin (NEURONTIN) 300 MG capsule Take 1 capsule by mouth 3 times daily for 180 days.  Intended supply: 90 days  Davida Brabmila DO       CareTeam (Including outside providers/suppliers regularly involved in providing care):   Patient Care Team:  Jacinto Rush DO as PCP - General (Internal Medicine)  Jacinto Rush DO as PCP - [facility] Empaneled Provider  Beth Woodard MD as Consulting Physician (Pulmonary Disease)     Reviewed and updated this visit:  Allergies  Meds

## 2023-02-01 ENCOUNTER — OFFICE VISIT (OUTPATIENT)
Dept: PRIMARY CARE CLINIC | Age: 68
End: 2023-02-01
Payer: MEDICARE

## 2023-02-01 VITALS
HEIGHT: 67 IN | OXYGEN SATURATION: 92 % | BODY MASS INDEX: 28.09 KG/M2 | TEMPERATURE: 97.5 F | WEIGHT: 179 LBS | HEART RATE: 85 BPM | SYSTOLIC BLOOD PRESSURE: 126 MMHG | DIASTOLIC BLOOD PRESSURE: 68 MMHG

## 2023-02-01 DIAGNOSIS — Z12.5 SCREENING FOR PROSTATE CANCER: ICD-10-CM

## 2023-02-01 DIAGNOSIS — J96.11 CHRONIC RESPIRATORY FAILURE WITH HYPOXIA (HCC): ICD-10-CM

## 2023-02-01 DIAGNOSIS — E78.2 MIXED HYPERLIPIDEMIA: ICD-10-CM

## 2023-02-01 DIAGNOSIS — M54.2 CERVICALGIA: ICD-10-CM

## 2023-02-01 DIAGNOSIS — I10 ESSENTIAL HYPERTENSION: ICD-10-CM

## 2023-02-01 DIAGNOSIS — J43.9 PULMONARY EMPHYSEMA, UNSPECIFIED EMPHYSEMA TYPE (HCC): Primary | ICD-10-CM

## 2023-02-01 DIAGNOSIS — I47.1 MULTIFOCAL ATRIAL TACHYCARDIA (HCC): ICD-10-CM

## 2023-02-01 DIAGNOSIS — E53.8 B12 DEFICIENCY: ICD-10-CM

## 2023-02-01 LAB
ALBUMIN SERPL-MCNC: 4.8 G/DL (ref 3.5–5.2)
ALP BLD-CCNC: 61 U/L (ref 40–129)
ALT SERPL-CCNC: 12 U/L (ref 0–40)
ANION GAP SERPL CALCULATED.3IONS-SCNC: 15 MMOL/L (ref 7–16)
AST SERPL-CCNC: 21 U/L (ref 0–39)
BILIRUB SERPL-MCNC: 0.5 MG/DL (ref 0–1.2)
BUN BLDV-MCNC: 16 MG/DL (ref 6–23)
CALCIUM SERPL-MCNC: 9.5 MG/DL (ref 8.6–10.2)
CHLORIDE BLD-SCNC: 103 MMOL/L (ref 98–107)
CHOLESTEROL, TOTAL: 142 MG/DL (ref 0–199)
CO2: 25 MMOL/L (ref 22–29)
CREAT SERPL-MCNC: 0.8 MG/DL (ref 0.7–1.2)
GFR SERPL CREATININE-BSD FRML MDRD: >60 ML/MIN/1.73
GLUCOSE BLD-MCNC: 97 MG/DL (ref 74–99)
HDLC SERPL-MCNC: 49 MG/DL
LDL CHOLESTEROL CALCULATED: 76 MG/DL (ref 0–99)
POTASSIUM SERPL-SCNC: 4.4 MMOL/L (ref 3.5–5)
PROSTATE SPECIFIC ANTIGEN: 0.69 NG/ML (ref 0–4)
SODIUM BLD-SCNC: 143 MMOL/L (ref 132–146)
TOTAL PROTEIN: 7.6 G/DL (ref 6.4–8.3)
TRIGL SERPL-MCNC: 86 MG/DL (ref 0–149)
VITAMIN B-12: 303 PG/ML (ref 211–946)
VLDLC SERPL CALC-MCNC: 17 MG/DL

## 2023-02-01 PROCEDURE — 3078F DIAST BP <80 MM HG: CPT | Performed by: INTERNAL MEDICINE

## 2023-02-01 PROCEDURE — 1123F ACP DISCUSS/DSCN MKR DOCD: CPT | Performed by: INTERNAL MEDICINE

## 2023-02-01 PROCEDURE — 99214 OFFICE O/P EST MOD 30 MIN: CPT | Performed by: INTERNAL MEDICINE

## 2023-02-01 PROCEDURE — 3074F SYST BP LT 130 MM HG: CPT | Performed by: INTERNAL MEDICINE

## 2023-02-01 SDOH — ECONOMIC STABILITY: HOUSING INSECURITY
IN THE LAST 12 MONTHS, WAS THERE A TIME WHEN YOU DID NOT HAVE A STEADY PLACE TO SLEEP OR SLEPT IN A SHELTER (INCLUDING NOW)?: NO

## 2023-02-01 SDOH — ECONOMIC STABILITY: INCOME INSECURITY: HOW HARD IS IT FOR YOU TO PAY FOR THE VERY BASICS LIKE FOOD, HOUSING, MEDICAL CARE, AND HEATING?: NOT HARD AT ALL

## 2023-02-01 SDOH — ECONOMIC STABILITY: FOOD INSECURITY: WITHIN THE PAST 12 MONTHS, YOU WORRIED THAT YOUR FOOD WOULD RUN OUT BEFORE YOU GOT MONEY TO BUY MORE.: NEVER TRUE

## 2023-02-01 SDOH — ECONOMIC STABILITY: FOOD INSECURITY: WITHIN THE PAST 12 MONTHS, THE FOOD YOU BOUGHT JUST DIDN'T LAST AND YOU DIDN'T HAVE MONEY TO GET MORE.: NEVER TRUE

## 2023-02-01 ASSESSMENT — ENCOUNTER SYMPTOMS
COUGH: 0
SHORTNESS OF BREATH: 1
EYE ITCHING: 0
RHINORRHEA: 0
STRIDOR: 0
DIARRHEA: 0
COLOR CHANGE: 0
ABDOMINAL PAIN: 0
NAUSEA: 0
SORE THROAT: 0
FACIAL SWELLING: 0
TROUBLE SWALLOWING: 0
BACK PAIN: 1
PHOTOPHOBIA: 0
ANAL BLEEDING: 0
EYE DISCHARGE: 0
EYE PAIN: 0
WHEEZING: 0
VOMITING: 0
CONSTIPATION: 0
BLOOD IN STOOL: 0

## 2023-02-01 ASSESSMENT — PATIENT HEALTH QUESTIONNAIRE - PHQ9
SUM OF ALL RESPONSES TO PHQ QUESTIONS 1-9: 0
SUM OF ALL RESPONSES TO PHQ9 QUESTIONS 1 & 2: 0
SUM OF ALL RESPONSES TO PHQ QUESTIONS 1-9: 0
1. LITTLE INTEREST OR PLEASURE IN DOING THINGS: 0
SUM OF ALL RESPONSES TO PHQ QUESTIONS 1-9: 0
2. FEELING DOWN, DEPRESSED OR HOPELESS: 0
SUM OF ALL RESPONSES TO PHQ QUESTIONS 1-9: 0

## 2023-02-01 ASSESSMENT — COPD QUESTIONNAIRES: COPD: 1

## 2023-02-01 NOTE — ASSESSMENT & PLAN NOTE
at goal.  Continue diltiazem 120 mg daily. May take an extra pill if he notices palpitations.   Check fasting CMP

## 2023-02-01 NOTE — ASSESSMENT & PLAN NOTE
no recent exacerbations. Follow-up with pulmonary. Continue his inhalers including his Advair HFA and his rescue DuoNeb treatment.

## 2023-02-01 NOTE — PROGRESS NOTES
2023    Name: Abhi Feliz : 1955 Sex: male  Age: 69 y.o. Subjective:  Chief Complaint   Patient presents with    Hypertension        Hypertension  Associated symptoms include shortness of breath. Pertinent negatives include no chest pain, headaches or palpitations. COPD  He complains of shortness of breath. There is no cough or wheezing. Pertinent negatives include no appetite change, chest pain, ear pain, headaches, myalgias, rhinorrhea, sore throat or trouble swallowing. Back Pain  Pertinent negatives include no abdominal pain, chest pain, dysuria, headaches, numbness or weakness. Patient saw Dr. Garwin Severs on 2023. Reviewed his report. He wants him to have an echocardiogram to look for any worsening of his mitral or tricuspid could vegetation which could be worsening pulmonary hypertension. Patient does have occasional peripheral edema but usually due to dietary indiscretion with salt. He also told patient that he could take an extra diltiazem if his heart rate goes up or he develops palpitations. .    Patient has a history of COPD . He also has chronic hypoxic respiratory failure on oxygen 24/7. When previously seen at pulmonary office he failed his  6-minute walk test.  . He stays on his oxygen 24/7    He denies any fever, chills, nausea or vomiting. He still has a cough but that is chronic for him. Recheck CT scan of his lung in 2022 revealed stable nodule of 15 mm of the right upper lobe. He also had smaller nodules in the left lower lobe unchanged. He saw Dr. Emi Rangel after that. I reviewed his evaluation and recommendations. He has a history of multifocal atrial tachycardia while he was in the hospital.  He was placed on diltiazem and he has noticed that his ankles are more swollen at the end of the day. No worsening shortness of breath. He complained of some left-sided neck pain radiating to his left shoulder.   X-rays were done in 2022 and personally reviewed by me. He had moderate osteoarthritis of his neck from C4-C7 but his left shoulder x-ray was normal.  This looks more like cervical radiculitis. He complains of pain in his feet and calves when he walks. Relieved by rest.  He noticed that the soles of his feet are very sensitive but he says that it has been that way since he was a child. Because of his previous history of nicotine use, will need to make sure he does not have peripheral vascular disease. He had an ultrasound of his abdominal aorta to rule out aortic aneurysm because he was a heavy smoker. No sign of any aneurysm. DL was done which showed some possibility of decreased blood flow to both lower extremities. CTA of his abdominal aorta with runoff was done which showed no evidence of aortic aneurysm and good blood flow to his legs. I think he might have some element of lumbar radiculopathy. He complains of numbness and tingling and pain of his legs. We started him on gabapentin his last visit and he is going to increase it to 300 mg a day then he will start taking 300 mg twice a day and then after a while take 300 milligrams 3 times a day. He was scheduled for nerve conduction test with a never got them done. Dom Tim His leg pain has improved tremendously since being on the gabapentin. Blood work in September 2022 was reviewed. Lipids still not at goal with total cholesterol 227, triglycerides 152 and LDL cholesterol 148. He is on rosuvastatin 5 mg a day. Fasting blood sugar was a little elevated at 104. Liver and kidney blood work was normal.  Hemoglobin A1c is trending up at 5.8%. Rosuvastatin was increased to 10 mg a day. We will recheck his lipids    He finished his COVID-19 vaccination series. He needs his fourth shot. He is up-to-date on his pneumonia vaccinations, and Tdap. He had his Shingrix series at [address] [place] [address] [facility] and will get a copy of the report.   He had his flu shot      Review of Systems Constitutional:  Negative for appetite change, fatigue and unexpected weight change. HENT:  Negative for congestion, ear pain, facial swelling, rhinorrhea, sore throat, tinnitus and trouble swallowing. Eyes:  Negative for photophobia, pain, discharge, itching and visual disturbance. Respiratory:  Positive for shortness of breath. Negative for cough, wheezing and stridor. Going up and down steps   Cardiovascular:  Positive for leg swelling. Negative for chest pain and palpitations. Gastrointestinal:  Negative for abdominal pain, anal bleeding, blood in stool, constipation, diarrhea, nausea and vomiting. Endocrine: Negative for cold intolerance, heat intolerance, polydipsia, polyphagia and polyuria. Genitourinary:  Negative for difficulty urinating, dysuria, flank pain, frequency, hematuria and urgency. Musculoskeletal:  Positive for arthralgias and back pain. Negative for gait problem, joint swelling and myalgias. Skin:  Negative for color change, pallor and rash. Allergic/Immunologic: Negative for environmental allergies and food allergies. Neurological:  Negative for dizziness, tremors, seizures, syncope, speech difficulty, weakness, light-headedness, numbness and headaches. Left side   Hematological:  Negative for adenopathy. Does not bruise/bleed easily. Psychiatric/Behavioral:  Negative for agitation, behavioral problems, confusion, sleep disturbance and suicidal ideas. The patient is not nervous/anxious. Current Outpatient Medications:     ipratropium-albuterol (DUONEB) 0.5-2.5 (3) MG/3ML SOLN nebulizer solution, Take 3 mLs by nebulization every 6 hours as needed for Shortness of Breath, Disp: 360 mL, Rfl: 3    DILT- MG extended release capsule, Take 1 capsule by mouth daily, Disp: 90 capsule, Rfl: 3    gabapentin (NEURONTIN) 300 MG capsule, Take 1 capsule by mouth 3 times daily for 180 days.  Intended supply: 90 days, Disp: 270 capsule, Rfl: 1    ADVAIR HFA 45-21 MCG/ACT inhaler, Inhale 2 puffs into the lungs 2 times daily, Disp: 1 each, Rfl: 5    rosuvastatin (CRESTOR) 10 MG tablet, Take 1 tablet by mouth daily, Disp: 90 tablet, Rfl: 1    meloxicam (MOBIC) 7.5 MG tablet, Take 1 tablet by mouth daily as needed for Pain, Disp: 30 tablet, Rfl: 5    OXYGEN, Inhale 2 L into the lungs as needed, Disp: , Rfl:     albuterol sulfate HFA (PROAIR HFA) 108 (90 Base) MCG/ACT inhaler, Inhale 2 puffs into the lungs every 6 hours as needed for Wheezing Inhale into the lungs, Disp: 18 g, Rfl: 3     No Known Allergies     Past Medical History:   Diagnosis Date    Alcohol abuse     Arthritis     COPD (chronic obstructive pulmonary disease) (HCC)     Hematochezia due to medication 2019    Multifocal atrial tachycardia (HCC)     paroxysmal    Nicotine dependence     Nodule of right lung        Health Maintenance Due   Topic Date Due    COVID-19 Vaccine (4 - Booster for Moderna series) 2022        Patient Active Problem List   Diagnosis    Multifocal atrial tachycardia (HCC)    Lumbar arthropathy    Lumbar radiculopathy    Nodule of upper lobe of left lung    COPD (chronic obstructive pulmonary disease) ([facility] [address]    Essential hypertension    Hyperglycemia    Localized edema    Chronic respiratory failure with hypoxia (HCC)    Screening for prostate cancer    Cervicalgia    Lumbar pain    Mixed hyperlipidemia    B12 deficiency        Past Surgical History:   Procedure Laterality Date    COLONOSCOPY      TONSILLECTOMY      UMBILICAL HERNIA REPAIR N/A 1646    OPEN UMBILICAL HERNIA REPAIR WITH MESH performed by Isrrael Cisneros DO at [facility] OR        Family History   Problem Relation Age of Onset    No Known Problems Mother     Heart Attack Father         Social History     Tobacco Use    Smoking status: Former     Packs/day: 1.50     Years: 45.00     Pack years: 67.50     Types: Cigarettes     Start date: 1973     Quit date: 2018     Years since quittin.0    Smokeless tobacco: Never   Vaping Use    Vaping Use: Never used   Substance Use Topics    Alcohol use: Yes     Alcohol/week: 4.0 standard drinks     Types: 4 Cans of beer per week    Drug use: Never        Objective  Vitals:    02/01/23 0857   BP: 126/68   Pulse: 85   Temp: 97.5 °F (36.4 °C)   SpO2: 92%   Weight: 179 lb (81.2 kg)   Height: 5' 7\" (1.702 m)        Exam:  Physical Exam  Vitals reviewed. Constitutional:       General: He is not in acute distress. Appearance: Normal appearance. He is well-developed and normal weight. He is not ill-appearing. Comments: Oxygen per nasal cannula at 2 L   HENT:      Head: Normocephalic and atraumatic. Right Ear: Tympanic membrane, ear canal and external ear normal. There is no impacted cerumen. Left Ear: Tympanic membrane, ear canal and external ear normal. There is no impacted cerumen. Eyes:      General: No scleral icterus. Right eye: No discharge. Left eye: No discharge. Conjunctiva/sclera: Conjunctivae normal.      Pupils: Pupils are equal, round, and reactive to light. Neck:      Thyroid: No thyromegaly. Vascular: No carotid bruit. Comments: He has decreased range of motion to sidebending with tenderness in the suboccipital area and some muscle spasms on his posterior  sternocleidomastoid muscle  Cardiovascular:      Rate and Rhythm: Normal rate and regular rhythm. Heart sounds: Normal heart sounds. No murmur heard. No friction rub. No gallop. Comments: Peripheral pulses are lightly decreased, DP and PT  Pulmonary:      Effort: Pulmonary effort is normal. No respiratory distress. Breath sounds: Decreased breath sounds present. No wheezing or rales. Comments: Breath sounds are markedly decreased bilaterally  Chest:      Chest wall: No tenderness. Abdominal:      General: Bowel sounds are normal. There is no distension. Palpations: Abdomen is soft. There is no mass. Tenderness:  There is no abdominal tenderness. There is no guarding or rebound. Hernia: No hernia is present. Comments: Well-healed midline infraumbilical scar with no evidence of infection. Musculoskeletal:         General: No swelling, tenderness or deformity. Normal range of motion. Cervical back: Neck supple. No muscular tenderness. Lymphadenopathy:      Cervical: No cervical adenopathy. Skin:     General: Skin is warm and dry. Coloration: Skin is not pale. Findings: No erythema or rash. Neurological:      General: No focal deficit present. Mental Status: He is alert and oriented to person, place, and time. Cranial Nerves: No cranial nerve deficit. Sensory: No sensory deficit. Deep Tendon Reflexes: Reflexes normal.   Psychiatric:         Mood and Affect: Mood normal.         Behavior: Behavior normal.         Thought Content: Thought content normal.         Judgment: Judgment normal.        Last labs reviewed. ASSESSMENT & PLAN :   Problem List          Circulatory    Multifocal atrial tachycardia (HCC)       stable. No further episodes. Follow-up with cardiology. Echocardiogram ordered by Dr. Shantel Levy. Relevant Medications    rosuvastatin (CRESTOR) 10 MG tablet    DILT- MG extended release capsule    Essential hypertension       at goal.  Continue diltiazem 120 mg daily. May take an extra pill if he notices palpitations. Check fasting CMP            Respiratory    COPD (chronic obstructive pulmonary disease) ([facility] [address] - Primary       no recent exacerbations. Follow-up with pulmonary. Continue his inhalers including his Advair HFA and his rescue DuoNeb treatment.          Relevant Medications    albuterol sulfate HFA (PROAIR HFA) 108 (90 Base) MCG/ACT inhaler    ADVAIR HFA 45-21 MCG/ACT inhaler    ipratropium-albuterol (DUONEB) 0.5-2.5 (3) MG/3ML SOLN nebulizer solution    Chronic respiratory failure with hypoxia (HCC)       at goal.  Continue 2 L of oxygen per nasal cannula 24/7            Other    Mixed hyperlipidemia       at goal.  Continue rosuvastatin 10 mg daily and watch diet. Check lipid profile         Relevant Medications    rosuvastatin (CRESTOR) 10 MG tablet    DILT- MG extended release capsule    Other Relevant Orders    Comprehensive Metabolic Panel    Lipid Panel    B12 deficiency       COPD patient's were shown to  have vitamin B12 deficiency so we will check vitamin B12 level         Relevant Orders    Vitamin B12    Screening for prostate cancer       no lower urinary symptoms. Check screening PSA         Relevant Orders    PSA Screening    Cervicalgia       secondary to osteoarthritis of the cervical spine. Take Tylenol as needed. Return in about 3 months (around 5/1/2023), or COPD, HTN, HL.        Nydia Jones, DO  2/1/2023

## 2023-02-01 NOTE — ASSESSMENT & PLAN NOTE
stable. No further episodes. Follow-up with cardiology. Echocardiogram ordered by Dr. Samara Simmons.

## 2023-02-02 LAB
LEFT VENTRICULAR EJECTION FRACTION MODE: NORMAL
LV EF: NORMAL %

## 2023-03-03 PROBLEM — Z12.5 SCREENING FOR PROSTATE CANCER: Status: RESOLVED | Noted: 2021-12-20 | Resolved: 2023-03-03

## 2023-03-13 ENCOUNTER — OFFICE VISIT (OUTPATIENT)
Dept: FAMILY MEDICINE CLINIC | Age: 68
End: 2023-03-13
Payer: MEDICARE

## 2023-03-13 VITALS
SYSTOLIC BLOOD PRESSURE: 124 MMHG | BODY MASS INDEX: 28.09 KG/M2 | RESPIRATION RATE: 18 BRPM | HEART RATE: 85 BPM | OXYGEN SATURATION: 92 % | WEIGHT: 179 LBS | HEIGHT: 67 IN | TEMPERATURE: 97.8 F | DIASTOLIC BLOOD PRESSURE: 82 MMHG

## 2023-03-13 DIAGNOSIS — J44.1 CHRONIC OBSTRUCTIVE PULMONARY DISEASE WITH ACUTE EXACERBATION (HCC): Primary | ICD-10-CM

## 2023-03-13 PROCEDURE — 3079F DIAST BP 80-89 MM HG: CPT | Performed by: NURSE PRACTITIONER

## 2023-03-13 PROCEDURE — 99214 OFFICE O/P EST MOD 30 MIN: CPT | Performed by: NURSE PRACTITIONER

## 2023-03-13 PROCEDURE — 3074F SYST BP LT 130 MM HG: CPT | Performed by: NURSE PRACTITIONER

## 2023-03-13 PROCEDURE — 1123F ACP DISCUSS/DSCN MKR DOCD: CPT | Performed by: NURSE PRACTITIONER

## 2023-03-13 RX ORDER — PREDNISONE 20 MG/1
40 TABLET ORAL DAILY
Qty: 10 TABLET | Refills: 0 | Status: SHIPPED | OUTPATIENT
Start: 2023-03-13 | End: 2023-03-18

## 2023-03-13 RX ORDER — DOXYCYCLINE HYCLATE 100 MG
100 TABLET ORAL 2 TIMES DAILY
Qty: 14 TABLET | Refills: 0 | Status: SHIPPED | OUTPATIENT
Start: 2023-03-13 | End: 2023-03-20

## 2023-03-13 RX ORDER — GUAIFENESIN 600 MG/1
600 TABLET, EXTENDED RELEASE ORAL 2 TIMES DAILY
Qty: 30 TABLET | Refills: 0 | Status: SHIPPED | OUTPATIENT
Start: 2023-03-13 | End: 2023-03-28

## 2023-03-13 RX ORDER — BENZONATATE 100 MG/1
100 CAPSULE ORAL 3 TIMES DAILY PRN
Qty: 30 CAPSULE | Refills: 0 | Status: SHIPPED | OUTPATIENT
Start: 2023-03-13 | End: 2023-03-23

## 2023-03-13 NOTE — PROGRESS NOTES
Chief Complaint   Cough (X 5 days)      HPI   Source of history provided by: patient. Kari Britton is a 69 y.o. old male who presents to walk-in care for evaluation of cough X 5 days. Associated symptoms include cough, chest congestion, and shortness of breath Since onset symptoms have been about the same. The patient is full vaccinated for COVID. Has taken nyquil at home with some symptomatic relief. Denies fever, chills, headache, sore throat, nasal congestion, rhinorrhea, nausea, vomiting, lethargy, body aches, otalgia, malaise, and sinus pressure. Pertinent PMH of: PMHpositive: COPD. Denies any PMH of URIhistory: asthma. The patient has a history of tobacco abuse and has quit. ROS   Pertinent positives and negatives are stated within HPI, all other systems reviewed and are negative. Past Medical History:  has a past medical history of Alcohol abuse, Arthritis, COPD (chronic obstructive pulmonary disease) ([address] Hematochezia due to medication, Multifocal atrial tachycardia ([address] Nicotine dependence, and Nodule of right lung. Surgical History:  has a past surgical history that includes Tonsillectomy; Colonoscopy; and Umbilical hernia repair (N/A, 2/1/2022). Social History:  reports that he quit smoking about 5 years ago. His smoking use included cigarettes. He started smoking about 49 years ago. He has a 67.50 pack-year smoking history. He has never used smokeless tobacco. He reports current alcohol use of about 4.0 standard drinks per week. He reports that he does not use drugs. Family History: family history includes Heart Attack in his father; No Known Problems in his mother. Allergies: Patient has no known allergies. Physical Exam      VS:  /82   Pulse 85   Temp 97.8 °F (36.6 °C) (Temporal)   Resp 18   Ht 5' 7\" (1.702 m)   Wt 179 lb (81.2 kg)   SpO2 92%   BMI 28.04 kg/m²    Oxygen Saturation Interpretation: Normal.    Physical Exam  Vitals and nursing note reviewed. Constitutional:       Appearance: Normal appearance. He is normal weight. HENT:      Head: Normocephalic and atraumatic. Right Ear: Tympanic membrane, ear canal and external ear normal.      Left Ear: Tympanic membrane, ear canal and external ear normal.      Nose: Congestion and rhinorrhea present. Mouth/Throat:      Mouth: Mucous membranes are moist.      Pharynx: Oropharynx is clear. Posterior oropharyngeal erythema present. Comments: Clear post nasal drip  Eyes:      Extraocular Movements: Extraocular movements intact. Conjunctiva/sclera: Conjunctivae normal.      Pupils: Pupils are equal, round, and reactive to light. Neck:      Thyroid: No thyroid mass, thyromegaly or thyroid tenderness. Trachea: Trachea normal.   Cardiovascular:      Rate and Rhythm: Normal rate and regular rhythm. Pulses: Normal pulses. Heart sounds: Normal heart sounds. Pulmonary:      Effort: Pulmonary effort is normal.      Breath sounds: Decreased breath sounds present. Abdominal:      General: Bowel sounds are normal.      Palpations: Abdomen is soft. Musculoskeletal:         General: Normal range of motion. Cervical back: Normal range of motion and neck supple. Lymphadenopathy:      Cervical: No cervical adenopathy. Skin:     General: Skin is warm and dry. Capillary Refill: Capillary refill takes less than 2 seconds. Neurological:      General: No focal deficit present. Mental Status: He is alert and oriented to person, place, and time. Sensory: Sensation is intact. Motor: Motor function is intact. Coordination: Coordination is intact. Gait: Gait is intact. Deep Tendon Reflexes: Reflexes normal.   Psychiatric:         Attention and Perception: Attention and perception normal.         Mood and Affect: Mood normal.         Speech: Speech normal.         Behavior: Behavior normal.         Thought Content:  Thought content normal.         Cognition and Memory: Cognition and memory normal.         Judgment: Judgment normal.         Lab / Imaging Results   (All laboratory and radiology results have been personally reviewed by myself)  Labs:  No results found for this visit on 03/13/23. Imaging: All Radiology results interpreted by Radiologist unless otherwise noted. No results found. Assessment/Plan  Luz Mcwilliams was seen today for cough. Diagnoses and all orders for this visit:    Chronic obstructive pulmonary disease with acute exacerbation (HCC)  -     benzonatate (TESSALON) 100 MG capsule; Take 1 capsule by mouth 3 times daily as needed for Cough  -     predniSONE (DELTASONE) 20 MG tablet; Take 2 tablets by mouth daily for 5 days  -     guaiFENesin (MUCINEX) 600 MG extended release tablet; Take 1 tablet by mouth 2 times daily for 15 days  -     doxycycline hyclate (VIBRA-TABS) 100 MG tablet; Take 1 tablet by mouth 2 times daily for 7 days      Increase fluids and rest.   Other symptomatic relief discussed including Tylenol prn pain/fever. Schedule f/u with PCP in 7-10 days if symptoms persist.  Go to ED sooner if symptoms worsen or change. ED immediately with high or refractory fever, progressive SOB, dyspnea, CP, calf pain/swelling, shaking chills, vomiting, abdominal pain, lethargy, flank pain, or decreased urinary output. Pt verbalizes understanding and is in agreement with plan of care. All questions answered. YENNY Nielsen NP    *NOTE: This report was transcribed using voice recognition software. Every effort was made to ensure accuracy; however, inadvertent computerized transcription errors may be present.

## 2023-03-22 ENCOUNTER — OFFICE VISIT (OUTPATIENT)
Dept: PRIMARY CARE CLINIC | Age: 68
End: 2023-03-22

## 2023-03-22 VITALS
OXYGEN SATURATION: 90 % | WEIGHT: 182 LBS | HEART RATE: 92 BPM | DIASTOLIC BLOOD PRESSURE: 82 MMHG | BODY MASS INDEX: 28.56 KG/M2 | SYSTOLIC BLOOD PRESSURE: 144 MMHG | TEMPERATURE: 97.5 F | HEIGHT: 67 IN

## 2023-03-22 DIAGNOSIS — J96.11 CHRONIC RESPIRATORY FAILURE WITH HYPOXIA (HCC): ICD-10-CM

## 2023-03-22 DIAGNOSIS — I10 ESSENTIAL HYPERTENSION: ICD-10-CM

## 2023-03-22 DIAGNOSIS — J43.9 PULMONARY EMPHYSEMA, UNSPECIFIED EMPHYSEMA TYPE (HCC): ICD-10-CM

## 2023-03-22 DIAGNOSIS — N28.1 CYST OF RIGHT KIDNEY: ICD-10-CM

## 2023-03-22 DIAGNOSIS — R91.1 LUNG NODULE SEEN ON IMAGING STUDY: ICD-10-CM

## 2023-03-22 DIAGNOSIS — J44.1 CHRONIC OBSTRUCTIVE PULMONARY DISEASE WITH ACUTE EXACERBATION (HCC): ICD-10-CM

## 2023-03-22 DIAGNOSIS — Z09 HOSPITAL DISCHARGE FOLLOW-UP: Primary | ICD-10-CM

## 2023-03-22 DIAGNOSIS — R91.1 NODULE OF UPPER LOBE OF LEFT LUNG: ICD-10-CM

## 2023-03-22 DIAGNOSIS — E78.2 MIXED HYPERLIPIDEMIA: ICD-10-CM

## 2023-03-22 RX ORDER — PREDNISONE 20 MG/1
TABLET ORAL
COMMUNITY
Start: 2023-03-20

## 2023-03-22 ASSESSMENT — ENCOUNTER SYMPTOMS
DIARRHEA: 0
WHEEZING: 0
NAUSEA: 0
TROUBLE SWALLOWING: 0
EYE DISCHARGE: 0
SORE THROAT: 0
CONSTIPATION: 0
ANAL BLEEDING: 0
BLOOD IN STOOL: 0
COLOR CHANGE: 0
EYE PAIN: 0
ABDOMINAL PAIN: 0
PHOTOPHOBIA: 0
FACIAL SWELLING: 0
EYE ITCHING: 0
STRIDOR: 0
SHORTNESS OF BREATH: 1
VOMITING: 0
RHINORRHEA: 0
COUGH: 1

## 2023-03-22 NOTE — ASSESSMENT & PLAN NOTE
will orders ultrasound of his right kidney to look at this lesion when he comes back in early May 2023

## 2023-03-22 NOTE — ASSESSMENT & PLAN NOTE
finish up his doxycycline 100 mg twice daily and his prednisone 60 mg a day.   If he is continues to be short of breath or gets any worsening shortness of breath he is to either go to the ED or contact his pulmonologist

## 2023-03-22 NOTE — PROGRESS NOTES
3    DILT- MG extended release capsule, Take 1 capsule by mouth daily, Disp: 90 capsule, Rfl: 3    gabapentin (NEURONTIN) 300 MG capsule, Take 1 capsule by mouth 3 times daily for 180 days.  Intended supply: 90 days, Disp: 270 capsule, Rfl: 1    ADVAIR HFA 45-21 MCG/ACT inhaler, Inhale 2 puffs into the lungs 2 times daily, Disp: 1 each, Rfl: 5    rosuvastatin (CRESTOR) 10 MG tablet, Take 1 tablet by mouth daily, Disp: 90 tablet, Rfl: 1    meloxicam (MOBIC) 7.5 MG tablet, Take 1 tablet by mouth daily as needed for Pain, Disp: 30 tablet, Rfl: 5    OXYGEN, Inhale 2 L into the lungs as needed, Disp: , Rfl:     albuterol sulfate HFA (PROAIR HFA) 108 (90 Base) MCG/ACT inhaler, Inhale 2 puffs into the lungs every 6 hours as needed for Wheezing Inhale into the lungs, Disp: 18 g, Rfl: 3     No Known Allergies     Past Medical History:   Diagnosis Date    Alcohol abuse     Arthritis     COPD (chronic obstructive pulmonary disease) (HCC)     Hematochezia due to medication 12/9/2019    Multifocal atrial tachycardia (HCC)     paroxysmal    Nicotine dependence     Nodule of right lung        Health Maintenance Due   Topic Date Due    COVID-19 Vaccine (4 - Booster for Moderna series) 01/28/2022        Patient Active Problem List   Diagnosis    Multifocal atrial tachycardia (HCC)    Lumbar arthropathy    Lumbar radiculopathy    Chronic obstructive pulmonary disease with acute exacerbation (HCC)    Lung nodule seen on imaging study    COPD (chronic obstructive pulmonary disease) ([address]    Essential hypertension    Hyperglycemia    Localized edema    Chronic respiratory failure with hypoxia ([address]    Cervicalgia    Lumbar pain    Mixed hyperlipidemia    B12 deficiency    Hospital discharge follow-up    Cyst of right kidney        Past Surgical History:   Procedure Laterality Date    COLONOSCOPY      TONSILLECTOMY      UMBILICAL HERNIA REPAIR N/A 3/8/6846    OPEN UMBILICAL HERNIA REPAIR WITH MESH performed by Faith Hills DO at

## 2023-03-22 NOTE — ASSESSMENT & PLAN NOTE
not at goal.  This could be secondary to his recent COPD exacerbation and his current prednisone treatment. He is to monitor his blood pressures at home and  let me know if they are significantly elevated at over 140/86  3 days in a row.   In the meantime continue his Cardizem  mg daily

## 2023-03-22 NOTE — ASSESSMENT & PLAN NOTE
continues albuterol HFA inhaler daily and his rescue inhaler of albuterol HFA.   Follow-up with Dr. Jay Jay Wood

## 2023-04-03 ENCOUNTER — TELEPHONE (OUTPATIENT)
Dept: PRIMARY CARE CLINIC | Age: 68
End: 2023-04-03

## 2023-04-03 DIAGNOSIS — J96.11 CHRONIC RESPIRATORY FAILURE WITH HYPOXIA (HCC): ICD-10-CM

## 2023-04-03 DIAGNOSIS — J43.9 PULMONARY EMPHYSEMA, UNSPECIFIED EMPHYSEMA TYPE (HCC): Primary | ICD-10-CM

## 2023-04-03 NOTE — TELEPHONE ENCOUNTER
Patient states that [facility] told him there is a shortage on the duo neb solution and no pharmacies can get it. They said you will need to call something else in. I asked if they had them separately and she checked. She said she has only 2 boxes of the ipatropium and she does have available boxes of the albuterol. Each box of the ipatropium has 25 vials. Do you want to order it that way or fill with something else?

## 2023-04-21 PROBLEM — Z09 HOSPITAL DISCHARGE FOLLOW-UP: Status: RESOLVED | Noted: 2023-03-22 | Resolved: 2023-04-21

## 2023-05-01 ENCOUNTER — OFFICE VISIT (OUTPATIENT)
Dept: PRIMARY CARE CLINIC | Age: 68
End: 2023-05-01
Payer: MEDICARE

## 2023-05-01 VITALS
BODY MASS INDEX: 27.15 KG/M2 | OXYGEN SATURATION: 94 % | TEMPERATURE: 97.8 F | HEIGHT: 67 IN | HEART RATE: 87 BPM | WEIGHT: 173 LBS | DIASTOLIC BLOOD PRESSURE: 60 MMHG | SYSTOLIC BLOOD PRESSURE: 122 MMHG

## 2023-05-01 DIAGNOSIS — E78.2 MIXED HYPERLIPIDEMIA: ICD-10-CM

## 2023-05-01 DIAGNOSIS — M79.604 PAIN IN BOTH LOWER EXTREMITIES: ICD-10-CM

## 2023-05-01 DIAGNOSIS — N28.1 CYST OF RIGHT KIDNEY: ICD-10-CM

## 2023-05-01 DIAGNOSIS — I10 ESSENTIAL HYPERTENSION: Primary | ICD-10-CM

## 2023-05-01 DIAGNOSIS — J44.1 CHRONIC OBSTRUCTIVE PULMONARY DISEASE WITH ACUTE EXACERBATION (HCC): ICD-10-CM

## 2023-05-01 DIAGNOSIS — R73.9 HYPERGLYCEMIA: ICD-10-CM

## 2023-05-01 DIAGNOSIS — R91.1 LUNG NODULE SEEN ON IMAGING STUDY: ICD-10-CM

## 2023-05-01 DIAGNOSIS — J43.9 PULMONARY EMPHYSEMA, UNSPECIFIED EMPHYSEMA TYPE (HCC): ICD-10-CM

## 2023-05-01 DIAGNOSIS — I10 ESSENTIAL HYPERTENSION: ICD-10-CM

## 2023-05-01 DIAGNOSIS — J96.11 CHRONIC RESPIRATORY FAILURE WITH HYPOXIA (HCC): ICD-10-CM

## 2023-05-01 DIAGNOSIS — I47.1 MULTIFOCAL ATRIAL TACHYCARDIA (HCC): ICD-10-CM

## 2023-05-01 DIAGNOSIS — M79.605 PAIN IN BOTH LOWER EXTREMITIES: ICD-10-CM

## 2023-05-01 LAB
ALBUMIN SERPL-MCNC: 4.5 G/DL (ref 3.5–5.2)
ALP SERPL-CCNC: 66 U/L (ref 40–129)
ALT SERPL-CCNC: 10 U/L (ref 0–40)
ANION GAP SERPL CALCULATED.3IONS-SCNC: 13 MMOL/L (ref 7–16)
AST SERPL-CCNC: 21 U/L (ref 0–39)
BILIRUB SERPL-MCNC: 0.6 MG/DL (ref 0–1.2)
BUN SERPL-MCNC: 12 MG/DL (ref 6–23)
CALCIUM SERPL-MCNC: 9.8 MG/DL (ref 8.6–10.2)
CHLORIDE SERPL-SCNC: 104 MMOL/L (ref 98–107)
CHOLESTEROL, TOTAL: 161 MG/DL (ref 0–199)
CO2 SERPL-SCNC: 27 MMOL/L (ref 22–29)
CREAT SERPL-MCNC: 0.9 MG/DL (ref 0.7–1.2)
GLUCOSE SERPL-MCNC: 97 MG/DL (ref 74–99)
HDLC SERPL-MCNC: 43 MG/DL
LDLC SERPL CALC-MCNC: 86 MG/DL (ref 0–99)
POTASSIUM SERPL-SCNC: 4.5 MMOL/L (ref 3.5–5)
PROT SERPL-MCNC: 7.3 G/DL (ref 6.4–8.3)
SODIUM SERPL-SCNC: 144 MMOL/L (ref 132–146)
TRIGL SERPL-MCNC: 158 MG/DL (ref 0–149)
VLDLC SERPL CALC-MCNC: 32 MG/DL

## 2023-05-01 PROCEDURE — 3074F SYST BP LT 130 MM HG: CPT | Performed by: INTERNAL MEDICINE

## 2023-05-01 PROCEDURE — 99214 OFFICE O/P EST MOD 30 MIN: CPT | Performed by: INTERNAL MEDICINE

## 2023-05-01 PROCEDURE — 1123F ACP DISCUSS/DSCN MKR DOCD: CPT | Performed by: INTERNAL MEDICINE

## 2023-05-01 PROCEDURE — 3078F DIAST BP <80 MM HG: CPT | Performed by: INTERNAL MEDICINE

## 2023-05-01 RX ORDER — ALBUTEROL SULFATE 2.5 MG/3ML
SOLUTION RESPIRATORY (INHALATION)
COMMUNITY
Start: 2023-04-06

## 2023-05-01 ASSESSMENT — ENCOUNTER SYMPTOMS
STRIDOR: 0
EYE DISCHARGE: 0
NAUSEA: 0
WHEEZING: 0
ABDOMINAL PAIN: 0
SORE THROAT: 0
EYE PAIN: 0
EYE ITCHING: 0
COLOR CHANGE: 0
VOMITING: 0
BLOOD IN STOOL: 0
TROUBLE SWALLOWING: 0
SHORTNESS OF BREATH: 1
DIARRHEA: 0
BACK PAIN: 1
CONSTIPATION: 0
PHOTOPHOBIA: 0
ANAL BLEEDING: 0
FACIAL SWELLING: 0
COUGH: 0
RHINORRHEA: 0

## 2023-05-01 ASSESSMENT — COPD QUESTIONNAIRES: COPD: 1

## 2023-05-01 NOTE — ASSESSMENT & PLAN NOTE
not at goal.  Nonfasting blood sugar 141.   We will check fasting blood sugar today and if elevated will order hemoglobin A1c

## 2023-05-01 NOTE — ASSESSMENT & PLAN NOTE
stable by CTA of his lungs in March 2023.   Follow-up with pulmonology and recheck CT of his lungs in March 2024

## 2023-05-01 NOTE — PROGRESS NOTES
time.      Cranial Nerves: No cranial nerve deficit. Sensory: No sensory deficit. Deep Tendon Reflexes: Reflexes normal.   Psychiatric:         Mood and Affect: Mood normal.         Behavior: Behavior normal.         Thought Content: Thought content normal.         Judgment: Judgment normal.        Last labs reviewed. ASSESSMENT & PLAN :   Problem List          Circulatory    Multifocal atrial tachycardia (HCC)       at goal.  Continue follow-up with Dr. Kareen Membreno         Relevant Medications    rosuvastatin (CRESTOR) 10 MG tablet    DILT- MG extended release capsule    Essential hypertension - Primary       at goal.  Continue diltiazem  mg daily         Relevant Orders    Lipid Panel       Respiratory    Lung nodule seen on imaging study       stable by CTA of his lungs in March 2023. Follow-up with pulmonology and recheck CT of his lungs in March 2024         [facility]       follow-up with Dr. Giancarlo Elaine. Continue his inhalers as directed         Relevant Medications    albuterol sulfate HFA (PROAIR HFA) 108 (90 Base) MCG/ACT inhaler    ADVAIR HFA 45-21 MCG/ACT inhaler    ipratropium-albuterol (DUONEB) 0.5-2.5 (3) MG/3ML SOLN nebulizer solution    ipratropium (ATROVENT) 0.02 % nebulizer solution    albuterol (PROVENTIL) (2.5 MG/3ML) 0.083% nebulizer solution    Chronic respiratory failure with hypoxia (HCC)       continue oxygen 24/7.   O2 saturation was 94% on 3 L         Relevant Medications    ipratropium (ATROVENT) 0.02 % nebulizer solution    RESOLVED: Chronic obstructive pulmonary disease with acute exacerbation (HCC)    Relevant Medications    albuterol sulfate HFA (PROAIR HFA) 108 (90 Base) MCG/ACT inhaler    ADVAIR HFA 45-21 MCG/ACT inhaler    ipratropium-albuterol (DUONEB) 0.5-2.5 (3) MG/3ML SOLN nebulizer solution    ipratropium (ATROVENT) 0.02 % nebulizer solution    albuterol (PROVENTIL) (2.5 MG/3ML) 0.083% nebulizer solution

## 2023-05-16 PROBLEM — R09.02 HYPOXIA: Status: ACTIVE | Noted: 2023-05-16

## 2023-07-06 DIAGNOSIS — J43.9 PULMONARY EMPHYSEMA, UNSPECIFIED EMPHYSEMA TYPE (HCC): ICD-10-CM

## 2023-07-06 RX ORDER — ALBUTEROL SULFATE 90 UG/1
2 AEROSOL, METERED RESPIRATORY (INHALATION) EVERY 6 HOURS PRN
Qty: 18 G | Refills: 3 | Status: SHIPPED | OUTPATIENT
Start: 2023-07-06

## 2023-07-06 NOTE — TELEPHONE ENCOUNTER
Patient states that he needs a refill on the albuterol inhaler. He said it has been a long time since he has had it refilled, but he is going to be going on vacation, and would like to have it available if he is able to. He said he does use his advair also.

## 2023-07-17 ENCOUNTER — TELEPHONE (OUTPATIENT)
Dept: PHARMACY | Facility: CLINIC | Age: 68
End: 2023-07-17

## 2023-07-17 NOTE — TELEPHONE ENCOUNTER
Patient called back regarding adherence. Patient stated he is still taking Rosuvastatin 10mg as prescribed, one tablet daily. Had bottle on hand and read out where it said it was last filled on 2/23/23 for 90 d/s. Stated he had about 30 tablets left in bottle he thinks. Advised patient that if taking consistently then should be out of medication by now. Patient unable to give a reason why there was that much left other than he must have forgotten to take a few times. Stated he takes medication before bed. Encouraged patient to set up reminder system such as alarm on phone or pill box next to bed to help remember to take every night. Patient also stated he has been meaning to talk to provider if still needing to take. Stated he has appointment in a couple of weeks and will talk to provider about it then. Thanked patient for call back.        Trevor Spangler, [address]   Phone: [phone]

## 2023-07-17 NOTE — TELEPHONE ENCOUNTER
[facility] CLINICAL PHARMACY: ADHERENCE REVIEW  Identified care gap per [place]: fills at [facility]: Statin adherence    ASSESSMENT   [address] Records claims through 23 (Prior Year [address] = not reported; YTD [address] = FIRST FILL; Potential Fail Date: 23): Rosuvastatin 10mg last filled on 23 for 90 day supply. Next refill due: 23    Prescribed si tablet/capsule daily    Per Insurer Portal: same. 1RF    First fill-pharmacy not contacted at this time. Lab Results   Component Value Date    CHOL 161 2023    TRIG 158 (H) 2023    HDL 43 2023    LDLCALC 86 2023     ALT   Date Value Ref Range Status   2023 10 0 - 40 U/L Final     AST   Date Value Ref Range Status   2023 21 0 - 39 U/L Final     The 10-year ASCVD risk score (Yenny MOSHER, et al., 2019) is: 17.8%    Values used to calculate the score:      Age: 77 years      Sex: Male      Is Non- : No      Diabetic: No      Tobacco smoker: No      Systolic Blood Pressure: 184 mmHg      Is BP treated: No      HDL Cholesterol: 43 mg/dL      Total Cholesterol: 161 mg/dL     The following are interventions that have been identified:   Patient overdue refilling Rosuvastatin 10mg and active on home medication list.     Attempting to reach patient to review. Left message asking for return call. Letter sent to patient. Last Visit: 23  Next Visit: 23      Florina Avilez CPhT.    [facility] free: [phone]     For Pharmacy Admin Tracking Only    Program: Aleta in place:  No  Gap Closed?: No   Time Spent (min): 15

## 2023-07-25 DIAGNOSIS — E78.2 MIXED HYPERLIPIDEMIA: ICD-10-CM

## 2023-07-25 RX ORDER — ROSUVASTATIN CALCIUM 10 MG/1
10 TABLET, COATED ORAL DAILY
Qty: 90 TABLET | Refills: 1 | Status: SHIPPED | OUTPATIENT
Start: 2023-07-25

## 2023-08-15 ENCOUNTER — OFFICE VISIT (OUTPATIENT)
Dept: PRIMARY CARE CLINIC | Age: 68
End: 2023-08-15
Payer: MEDICARE

## 2023-08-15 ENCOUNTER — TELEPHONE (OUTPATIENT)
Dept: PRIMARY CARE CLINIC | Age: 68
End: 2023-08-15

## 2023-08-15 VITALS
WEIGHT: 178 LBS | HEIGHT: 67 IN | TEMPERATURE: 97.8 F | HEART RATE: 80 BPM | BODY MASS INDEX: 27.94 KG/M2 | DIASTOLIC BLOOD PRESSURE: 62 MMHG | OXYGEN SATURATION: 94 % | SYSTOLIC BLOOD PRESSURE: 122 MMHG

## 2023-08-15 DIAGNOSIS — J43.9 PULMONARY EMPHYSEMA, UNSPECIFIED EMPHYSEMA TYPE (HCC): ICD-10-CM

## 2023-08-15 DIAGNOSIS — E78.2 MIXED HYPERLIPIDEMIA: ICD-10-CM

## 2023-08-15 DIAGNOSIS — I47.1 MULTIFOCAL ATRIAL TACHYCARDIA (HCC): ICD-10-CM

## 2023-08-15 DIAGNOSIS — I10 ESSENTIAL HYPERTENSION: Primary | ICD-10-CM

## 2023-08-15 DIAGNOSIS — J96.11 CHRONIC RESPIRATORY FAILURE WITH HYPOXIA (HCC): ICD-10-CM

## 2023-08-15 DIAGNOSIS — E53.8 B12 DEFICIENCY: ICD-10-CM

## 2023-08-15 PROBLEM — R60.0 LOCALIZED EDEMA: Status: RESOLVED | Noted: 2020-03-11 | Resolved: 2023-08-15

## 2023-08-15 PROBLEM — R73.9 HYPERGLYCEMIA: Status: RESOLVED | Noted: 2020-03-11 | Resolved: 2023-08-15

## 2023-08-15 PROCEDURE — 1123F ACP DISCUSS/DSCN MKR DOCD: CPT | Performed by: INTERNAL MEDICINE

## 2023-08-15 PROCEDURE — 99214 OFFICE O/P EST MOD 30 MIN: CPT | Performed by: INTERNAL MEDICINE

## 2023-08-15 PROCEDURE — 3074F SYST BP LT 130 MM HG: CPT | Performed by: INTERNAL MEDICINE

## 2023-08-15 PROCEDURE — 3078F DIAST BP <80 MM HG: CPT | Performed by: INTERNAL MEDICINE

## 2023-08-15 ASSESSMENT — ENCOUNTER SYMPTOMS
EYE DISCHARGE: 0
ANAL BLEEDING: 0
ABDOMINAL PAIN: 0
SHORTNESS OF BREATH: 1
TROUBLE SWALLOWING: 0
VOMITING: 0
RHINORRHEA: 0
SORE THROAT: 0
COLOR CHANGE: 0
BACK PAIN: 1
NAUSEA: 0
WHEEZING: 0
EYE ITCHING: 0
EYE PAIN: 0
COUGH: 0
BLOOD IN STOOL: 0
PHOTOPHOBIA: 0
DIARRHEA: 0
STRIDOR: 0
FACIAL SWELLING: 0
CONSTIPATION: 0

## 2023-08-15 ASSESSMENT — COPD QUESTIONNAIRES: COPD: 1

## 2023-08-15 NOTE — ASSESSMENT & PLAN NOTE
stable. O2 saturations are acceptable on 2 L of oxygen.   Continue his oxygen and follow-up with  pulmonary

## 2023-08-15 NOTE — PATIENT INSTRUCTIONS
Start Vitamin B12 1000 mcg daily  Return for fasting labs in about 2 weeks  Get Covid booster, flu shot and RSV vaccine at your pharmacy in Sept/Oct 2023

## 2023-08-15 NOTE — ASSESSMENT & PLAN NOTE
table.  Continue his Advair HFA daily and his albuterol HFA as needed. He also has DuoNebs to use in his nebulizer machine as needed.

## 2023-08-15 NOTE — PROGRESS NOTES
8/15/2023    Name: Pierre Castillo : 1955 Sex: male  Age: 77 y.o. Subjective:  No chief complaint on file. Hypertension  Associated symptoms include shortness of breath. Pertinent negatives include no chest pain, headaches or palpitations. COPD  He complains of shortness of breath. There is no cough or wheezing. Pertinent negatives include no appetite change, chest pain, ear pain, headaches, myalgias, rhinorrhea, sore throat or trouble swallowing. Back Pain  Pertinent negatives include no abdominal pain, chest pain, dysuria, headaches, numbness or weakness. Patient saw Dr. Sariah Dillard on 2023. Reviewed his report. Patient does have occasional peripheral edema but usually due to dietary indiscretion with salt. Reviewed his echocardiogram report in 2023 which showed borderline LV systolic function, mild concentric LVH, moderate dilatation of the right and left atrium. Right ventricle dilated but with normal  right ventricular systolic function. He did not have any mitral stenosis or aortic stenosis. Mild mitral and tricuspid regurgitation. His right atrium had an abnormal membrane. .    Patient has a history of COPD . He also has chronic hypoxic respiratory failure on oxygen 24/7. When previously seen at pulmonary office he failed his  6-minute walk test.  . He stays on his oxygen 24/7. He sees Dr. Una Aguirre on a regular basis. We will get report of his last office visit    He had a CTA of his lungs in 2023 which revealed his right sided pulmonary nodules which had little change from previous. So he cannot get another CT scan of his lungs until     The CTA also revealed a right kidney lesion which most probably is a cyst.  Ultrasound revealed a 1.5 cm cyst of the right kidney    He has a history of multifocal atrial tachycardia . He was placed on diltiazem and he has noticed that his ankles are more swollen at the end of the day. No worsening shortness of breath.

## 2023-08-29 DIAGNOSIS — E78.2 MIXED HYPERLIPIDEMIA: ICD-10-CM

## 2023-08-29 DIAGNOSIS — I10 ESSENTIAL HYPERTENSION: ICD-10-CM

## 2023-08-29 DIAGNOSIS — E53.8 B12 DEFICIENCY: ICD-10-CM

## 2023-08-29 LAB
ALBUMIN SERPL-MCNC: 4.6 G/DL (ref 3.5–5.2)
ALP BLD-CCNC: 57 U/L (ref 40–129)
ALT SERPL-CCNC: 13 U/L (ref 0–40)
ANION GAP SERPL CALCULATED.3IONS-SCNC: 14 MMOL/L (ref 7–16)
AST SERPL-CCNC: 26 U/L (ref 0–39)
BILIRUB SERPL-MCNC: 0.6 MG/DL (ref 0–1.2)
BUN BLDV-MCNC: 13 MG/DL (ref 6–23)
CALCIUM SERPL-MCNC: 9.6 MG/DL (ref 8.6–10.2)
CHLORIDE BLD-SCNC: 99 MMOL/L (ref 98–107)
CHOLESTEROL: 153 MG/DL
CO2: 27 MMOL/L (ref 22–29)
CREAT SERPL-MCNC: 0.8 MG/DL (ref 0.7–1.2)
GFR SERPL CREATININE-BSD FRML MDRD: >60 ML/MIN/1.73M2
GLUCOSE BLD-MCNC: 85 MG/DL (ref 74–99)
HDLC SERPL-MCNC: 55 MG/DL
LDL CHOLESTEROL: 74 MG/DL
POTASSIUM SERPL-SCNC: 5.3 MMOL/L (ref 3.5–5)
SODIUM BLD-SCNC: 140 MMOL/L (ref 132–146)
TOTAL PROTEIN: 7.1 G/DL (ref 6.4–8.3)
TRIGL SERPL-MCNC: 118 MG/DL
VITAMIN B-12: 553 PG/ML (ref 211–946)
VLDLC SERPL CALC-MCNC: 24 MG/DL

## 2023-09-12 DIAGNOSIS — J43.9 PULMONARY EMPHYSEMA, UNSPECIFIED EMPHYSEMA TYPE (HCC): ICD-10-CM

## 2023-09-12 RX ORDER — FLUTICASONE PROPIONATE AND SALMETEROL XINAFOATE 45; 21 UG/1; UG/1
2 AEROSOL, METERED RESPIRATORY (INHALATION) 2 TIMES DAILY
Qty: 1 EACH | Refills: 5 | Status: SHIPPED | OUTPATIENT
Start: 2023-09-12

## 2023-09-12 NOTE — TELEPHONE ENCOUNTER
Name of Medication(s) Requested:  [facility]    Pharmacy Requested:   [facility]    Medication(s) pended? [x] Yes  [] No    Last Appointment:  8/15/2023    Future appts:  Future Appointments   Date Time Provider [address] [place] [address]   11/14/2023 10:30 AM [address] [place], [place] [address]   5/21/2024 11:30 AM Nicole Hutton MD [facility]          Does patient need call back?   [] Yes  [x] No

## 2023-09-27 DIAGNOSIS — R91.1 PULMONARY NODULE: Primary | ICD-10-CM

## 2023-11-06 ENCOUNTER — OFFICE VISIT (OUTPATIENT)
Dept: FAMILY MEDICINE CLINIC | Age: 68
End: 2023-11-06
Payer: MEDICARE

## 2023-11-06 VITALS
HEIGHT: 67 IN | DIASTOLIC BLOOD PRESSURE: 72 MMHG | TEMPERATURE: 98.3 F | WEIGHT: 177 LBS | BODY MASS INDEX: 27.78 KG/M2 | RESPIRATION RATE: 22 BRPM | SYSTOLIC BLOOD PRESSURE: 130 MMHG | HEART RATE: 85 BPM

## 2023-11-06 DIAGNOSIS — J44.1 COPD EXACERBATION (HCC): Primary | ICD-10-CM

## 2023-11-06 DIAGNOSIS — R09.02 HYPOXIA: ICD-10-CM

## 2023-11-06 PROCEDURE — 3078F DIAST BP <80 MM HG: CPT | Performed by: NURSE PRACTITIONER

## 2023-11-06 PROCEDURE — 1123F ACP DISCUSS/DSCN MKR DOCD: CPT | Performed by: NURSE PRACTITIONER

## 2023-11-06 PROCEDURE — 99215 OFFICE O/P EST HI 40 MIN: CPT | Performed by: NURSE PRACTITIONER

## 2023-11-06 PROCEDURE — 3075F SYST BP GE 130 - 139MM HG: CPT | Performed by: NURSE PRACTITIONER

## 2023-11-10 ENCOUNTER — TELEPHONE (OUTPATIENT)
Dept: PRIMARY CARE CLINIC | Age: 68
End: 2023-11-10

## 2023-11-10 NOTE — TELEPHONE ENCOUNTER
Care Transitions Initial Follow Up Call    Outreach made within 2 business days of discharge: Yes    Patient: Que Wood Patient : 1955   MRN: [id number]  Reason for Admission: There are no discharge diagnoses documented for the most recent discharge. Discharge Date: 23       Spoke with: left message for pt to return call.

## 2023-11-10 NOTE — TELEPHONE ENCOUNTER
Needs hospital follow-up.   Can see me after 20 November as long as it is within 2 weeks of discharge

## 2023-11-10 NOTE — TELEPHONE ENCOUNTER
Care Transitions Initial Follow Up Call    Call within 2 business days of discharge: Yes     Patient: Rubén Langley Patient : 1955 MRN: [id number]    [unfilled]    RARS: No data recorded     Spoke with: Payton Perera    Discharge department/facility: [facility]    Non-face-to-face services provided:  Scheduled appointment with PCP-Dr Huey Mccoy    Follow Up  Future Appointments   Date Time Provider [address] [place] [address]   2023  8:00 AM Juma Valenzuela MD [facility]   2023  3:00 PM DO Edmond Ojeda [facility]   2024 11:30 AM Aparna Stevens MD [facility]       Ana Michaels

## 2023-11-16 ENCOUNTER — OFFICE VISIT (OUTPATIENT)
Dept: PRIMARY CARE CLINIC | Age: 68
End: 2023-11-16

## 2023-11-16 VITALS
HEIGHT: 67 IN | HEART RATE: 97 BPM | SYSTOLIC BLOOD PRESSURE: 122 MMHG | WEIGHT: 183 LBS | TEMPERATURE: 98.2 F | OXYGEN SATURATION: 91 % | BODY MASS INDEX: 28.72 KG/M2 | DIASTOLIC BLOOD PRESSURE: 66 MMHG

## 2023-11-16 DIAGNOSIS — J02.9 SORE THROAT: ICD-10-CM

## 2023-11-16 DIAGNOSIS — J43.9 PULMONARY EMPHYSEMA, UNSPECIFIED EMPHYSEMA TYPE (HCC): ICD-10-CM

## 2023-11-16 DIAGNOSIS — Z09 HOSPITAL DISCHARGE FOLLOW-UP: Primary | ICD-10-CM

## 2023-11-16 LAB — S PYO AG THROAT QL: NORMAL

## 2023-11-16 RX ORDER — PREDNISONE 10 MG/1
TABLET ORAL
COMMUNITY
Start: 2023-11-08

## 2023-11-16 RX ORDER — FAMOTIDINE 20 MG/1
20 TABLET, FILM COATED ORAL DAILY
COMMUNITY
Start: 2023-11-08

## 2023-11-16 NOTE — PROGRESS NOTES
Post-Discharge Transitional Care  Follow Up      Nandini Rajan   YOB: 1955    Date of Office Visit:  11/16/2023  Date of Hospital Admission: 2/1/22  Date of Hospital Discharge: 2/1/22  Risk of hospital readmission (high >=14%. Medium >=10%) :No data recorded    Care management risk score Rising risk (score 2-5) and Complex Care (Scores >=6): No Risk Score On File     Non face to face  following discharge, date last encounter closed (first attempt may have been earlier): 11/10/2023    Call initiated 2 business days of discharge: Yes    ASSESSMENT/PLAN:   Hospital discharge follow-up  -     DE DISCHARGE MEDS RECONCILED W/ CURRENT OUTPATIENT MED LIST  -     DE DISCHARGE MEDS RECONCILED W/ CURRENT OUTPATIENT MED LIST  Pulmonary emphysema, unspecified emphysema type ([address])  Comments: Follow-up for COPD exacerbation. Patient advised to complete steroid course. At baseline of 2 L nasal cannula  Sore throat  Comments:  Strep negative. Orders:  -     POCT rapid strep A      Medical Decision Making: moderate complexity  No follow-ups on file. On this date 11/16/2023 I have spent 20 minutes reviewing previous notes, test results and face to face with the patient discussing the diagnosis and importance of compliance with the treatment plan as well as documenting on the day of the visit. Subjective:   HPI:  Follow up of Hospital problems/diagnosis(es): COPD exacerbation and rhinovirus. Patient was initially sent to emergency room after being found to have saturations of 72% on his baseline 2 L nasal cannula. In ED patient continues to experience shortness of breath and was found to be saturating 90s to 94%. Patient was then found to have rhinovirus in the ED and was prescribed steroids. He reports continued to take steroids and has last dose tomorrow. Overall patient required up to 4 L nasal cannula oxygen. He he was brought down back to his baseline of 2 L nasal cannula prior to discharge.

## 2023-12-26 ENCOUNTER — OFFICE VISIT (OUTPATIENT)
Dept: PRIMARY CARE CLINIC | Age: 68
End: 2023-12-26
Payer: MEDICARE

## 2023-12-26 VITALS
OXYGEN SATURATION: 92 % | HEART RATE: 97 BPM | WEIGHT: 174 LBS | TEMPERATURE: 99 F | SYSTOLIC BLOOD PRESSURE: 130 MMHG | BODY MASS INDEX: 27.31 KG/M2 | DIASTOLIC BLOOD PRESSURE: 78 MMHG | HEIGHT: 67 IN

## 2023-12-26 DIAGNOSIS — I47.19 MULTIFOCAL ATRIAL TACHYCARDIA: ICD-10-CM

## 2023-12-26 DIAGNOSIS — R73.9 HYPERGLYCEMIA: ICD-10-CM

## 2023-12-26 DIAGNOSIS — D64.9 ANEMIA, UNSPECIFIED TYPE: ICD-10-CM

## 2023-12-26 DIAGNOSIS — J43.9 PULMONARY EMPHYSEMA, UNSPECIFIED EMPHYSEMA TYPE (HCC): Primary | ICD-10-CM

## 2023-12-26 LAB
ABSOLUTE IMMATURE GRANULOCYTE: <0.03 K/UL (ref 0–0.58)
BASOPHILS ABSOLUTE: 0.07 K/UL (ref 0–0.2)
BASOPHILS RELATIVE PERCENT: 1 % (ref 0–2)
EOSINOPHILS ABSOLUTE: 0.27 K/UL (ref 0.05–0.5)
EOSINOPHILS RELATIVE PERCENT: 4 % (ref 0–6)
HBA1C MFR BLD: 5.8 % (ref 4–5.6)
HCT VFR BLD CALC: 41.4 % (ref 37–54)
HEMOGLOBIN: 13.5 G/DL (ref 12.5–16.5)
IMMATURE GRANULOCYTES: 0 % (ref 0–5)
LYMPHOCYTES ABSOLUTE: 1.71 K/UL (ref 1.5–4)
LYMPHOCYTES RELATIVE PERCENT: 23 % (ref 20–42)
MCH RBC QN AUTO: 31.1 PG (ref 26–35)
MCHC RBC AUTO-ENTMCNC: 32.6 G/DL (ref 32–34.5)
MCV RBC AUTO: 95.4 FL (ref 80–99.9)
MONOCYTES ABSOLUTE: 0.66 K/UL (ref 0.1–0.95)
MONOCYTES RELATIVE PERCENT: 9 % (ref 2–12)
NEUTROPHILS ABSOLUTE: 4.69 K/UL (ref 1.8–7.3)
NEUTROPHILS RELATIVE PERCENT: 63 % (ref 43–80)
PDW BLD-RTO: 13.4 % (ref 11.5–15)
PLATELET # BLD: 248 K/UL (ref 130–450)
PMV BLD AUTO: 10.2 FL (ref 7–12)
RBC # BLD: 4.34 M/UL (ref 3.8–5.8)
WBC # BLD: 7.4 K/UL (ref 4.5–11.5)

## 2023-12-26 PROCEDURE — 3078F DIAST BP <80 MM HG: CPT | Performed by: INTERNAL MEDICINE

## 2023-12-26 PROCEDURE — 3075F SYST BP GE 130 - 139MM HG: CPT | Performed by: INTERNAL MEDICINE

## 2023-12-26 PROCEDURE — 99214 OFFICE O/P EST MOD 30 MIN: CPT | Performed by: INTERNAL MEDICINE

## 2023-12-26 PROCEDURE — 1123F ACP DISCUSS/DSCN MKR DOCD: CPT | Performed by: INTERNAL MEDICINE

## 2023-12-26 RX ORDER — DILTIAZEM HYDROCHLORIDE 120 MG/1
120 CAPSULE, EXTENDED RELEASE ORAL DAILY
Qty: 90 CAPSULE | Refills: 3 | Status: SHIPPED | OUTPATIENT
Start: 2023-12-26 | End: 2024-12-20

## 2023-12-26 RX ORDER — IPRATROPIUM BROMIDE AND ALBUTEROL SULFATE 2.5; .5 MG/3ML; MG/3ML
1 SOLUTION RESPIRATORY (INHALATION) EVERY 6 HOURS PRN
Qty: 360 ML | Refills: 3 | Status: SHIPPED | OUTPATIENT
Start: 2023-12-26

## 2023-12-26 NOTE — PROGRESS NOTES
ipratropium (ATROVENT) 0.02 % nebulizer solution, Take 2.5 mLs by nebulization every 4 hours as needed for Wheezing (Patient not taking: Reported on 2023), Disp: 50 mL, Rfl: 3     No Known Allergies     Past Medical History:   Diagnosis Date    Alcohol abuse     Arthritis     COPD (chronic obstructive pulmonary disease) (HCC)     Hematochezia due to medication 2019    Multifocal atrial tachycardia     paroxysmal    Nicotine dependence        Health Maintenance Due   Topic Date Due    Respiratory Syncytial Virus (RSV) Pregnant or age 61 yrs+ (3 - 3-dose 60+ series) Never done    Low dose CT lung screening &/or counseling  2023    Annual Wellness Visit (AWV)  2023        Patient Active Problem List   Diagnosis    Multifocal atrial tachycardia    Lumbar arthropathy    Lumbar radiculopathy    Lung nodule seen on imaging study    COPD (chronic obstructive pulmonary disease) (HCC)    Essential hypertension    Hyperglycemia    Chronic respiratory failure with hypoxia (HCC)    Cervicalgia    Lumbar pain    Mixed hyperlipidemia    B12 deficiency    Cyst of right kidney    Pain in both lower extremities    Hypoxia    Anemia        Past Surgical History:   Procedure Laterality Date    COLONOSCOPY      TONSILLECTOMY      UMBILICAL HERNIA REPAIR N/A 1623    OPEN UMBILICAL HERNIA REPAIR WITH MESH performed by Chi Torres DO at [facility] OR        Family History   Problem Relation Age of Onset    No Known Problems Mother     Heart Attack Father         Social History     Tobacco Use    Smoking status: Former     Current packs/day: 0.00     Average packs/day: 1.5 packs/day for 45.0 years (67.5 ttl pk-yrs)     Types: Cigarettes     Start date: 1973     Quit date: 2018     Years since quittin.9    Smokeless tobacco: Never   Vaping Use    Vaping Use: Never used   Substance Use Topics    Alcohol use:  Yes     Alcohol/week: 4.0 standard drinks of alcohol     Types: 4 Cans of beer per week    Drug

## 2023-12-27 NOTE — ASSESSMENT & PLAN NOTE
no recent episodes.   Follow-up with cardiology in January 2024 and continue his diltiazem  mg.  Prescription given

## 2023-12-27 NOTE — ASSESSMENT & PLAN NOTE
not at goal.  Check hemoglobin A1c to look for underlying diabetes.   Watch carbs and sweets in his diet

## 2023-12-27 NOTE — ASSESSMENT & PLAN NOTE
not at goal.  Check CBC. If hemoglobin still low to workup. B12 was normal this year.   May need a GI workup for for anemia because

## 2024-01-24 DIAGNOSIS — M54.16 LUMBAR RADICULOPATHY: ICD-10-CM

## 2024-01-24 RX ORDER — GABAPENTIN 300 MG/1
300 CAPSULE ORAL 3 TIMES DAILY
Qty: 270 CAPSULE | Refills: 1 | Status: SHIPPED | OUTPATIENT
Start: 2024-01-24 | End: 2024-07-22

## 2024-01-24 NOTE — TELEPHONE ENCOUNTER
Last Appointment:  11/16/2023  Future Appointments   Date Time Provider Department Center   4/2/2024 11:00 AM [facility]   5/21/2024 11:30 AM Isrrael Fuentes MD [facility]

## 2024-03-18 DIAGNOSIS — E78.2 MIXED HYPERLIPIDEMIA: ICD-10-CM

## 2024-03-18 RX ORDER — ROSUVASTATIN CALCIUM 10 MG/1
10 TABLET, COATED ORAL DAILY
Qty: 90 TABLET | Refills: 1 | Status: SHIPPED | OUTPATIENT
Start: 2024-03-18

## 2024-03-18 NOTE — TELEPHONE ENCOUNTER
Patient had his establishing appt with Dr Quiroz rescheduled. He needs a refill on his rosuvastatin.

## 2024-04-02 ENCOUNTER — OFFICE VISIT (OUTPATIENT)
Dept: PRIMARY CARE CLINIC | Age: 69
End: 2024-04-02
Payer: MEDICARE

## 2024-04-02 VITALS
SYSTOLIC BLOOD PRESSURE: 138 MMHG | HEART RATE: 78 BPM | WEIGHT: 178 LBS | BODY MASS INDEX: 27.94 KG/M2 | OXYGEN SATURATION: 94 % | DIASTOLIC BLOOD PRESSURE: 80 MMHG | HEIGHT: 67 IN | TEMPERATURE: 97.5 F | RESPIRATION RATE: 18 BRPM

## 2024-04-02 DIAGNOSIS — J43.8 OTHER EMPHYSEMA (HCC): Primary | ICD-10-CM

## 2024-04-02 PROBLEM — I47.10 PAROXYSMAL SUPRAVENTRICULAR TACHYCARDIA (HCC): Status: RESOLVED | Noted: 2024-04-02 | Resolved: 2024-04-02

## 2024-04-02 PROBLEM — J96.11 CHRONIC RESPIRATORY FAILURE WITH HYPOXIA (HCC): Status: RESOLVED | Noted: 2020-09-15 | Resolved: 2024-04-02

## 2024-04-02 PROBLEM — I47.10 SVT (SUPRAVENTRICULAR TACHYCARDIA) (HCC): Status: ACTIVE | Noted: 2024-04-02

## 2024-04-02 PROCEDURE — 3075F SYST BP GE 130 - 139MM HG: CPT | Performed by: STUDENT IN AN ORGANIZED HEALTH CARE EDUCATION/TRAINING PROGRAM

## 2024-04-02 PROCEDURE — 99213 OFFICE O/P EST LOW 20 MIN: CPT | Performed by: STUDENT IN AN ORGANIZED HEALTH CARE EDUCATION/TRAINING PROGRAM

## 2024-04-02 PROCEDURE — 1123F ACP DISCUSS/DSCN MKR DOCD: CPT | Performed by: STUDENT IN AN ORGANIZED HEALTH CARE EDUCATION/TRAINING PROGRAM

## 2024-04-02 PROCEDURE — 3079F DIAST BP 80-89 MM HG: CPT | Performed by: STUDENT IN AN ORGANIZED HEALTH CARE EDUCATION/TRAINING PROGRAM

## 2024-04-02 RX ORDER — FLUTICASONE PROPIONATE AND SALMETEROL 250; 50 UG/1; UG/1
1 POWDER RESPIRATORY (INHALATION) EVERY 12 HOURS
Qty: 60 EACH | Refills: 3 | Status: CANCELLED | OUTPATIENT
Start: 2024-04-02

## 2024-04-02 RX ORDER — FLUTICASONE PROPIONATE AND SALMETEROL 100; 50 UG/1; UG/1
1 POWDER RESPIRATORY (INHALATION) EVERY 12 HOURS
Qty: 1 EACH | Refills: 4 | Status: SHIPPED | OUTPATIENT
Start: 2024-04-02

## 2024-04-02 ASSESSMENT — ANXIETY QUESTIONNAIRES
1. FEELING NERVOUS, ANXIOUS, OR ON EDGE: NOT AT ALL
6. BECOMING EASILY ANNOYED OR IRRITABLE: NOT AT ALL
IF YOU CHECKED OFF ANY PROBLEMS ON THIS QUESTIONNAIRE, HOW DIFFICULT HAVE THESE PROBLEMS MADE IT FOR YOU TO DO YOUR WORK, TAKE CARE OF THINGS AT HOME, OR GET ALONG WITH OTHER PEOPLE: NOT DIFFICULT AT ALL
4. TROUBLE RELAXING: NOT AT ALL
1. FEELING NERVOUS, ANXIOUS, OR ON EDGE: NOT AT ALL
7. FEELING AFRAID AS IF SOMETHING AWFUL MIGHT HAPPEN: NOT AT ALL
3. WORRYING TOO MUCH ABOUT DIFFERENT THINGS: NOT AT ALL
5. BEING SO RESTLESS THAT IT IS HARD TO SIT STILL: NOT AT ALL
6. BECOMING EASILY ANNOYED OR IRRITABLE: NOT AT ALL
IF YOU CHECKED OFF ANY PROBLEMS ON THIS QUESTIONNAIRE, HOW DIFFICULT HAVE THESE PROBLEMS MADE IT FOR YOU TO DO YOUR WORK, TAKE CARE OF THINGS AT HOME, OR GET ALONG WITH OTHER PEOPLE: NOT DIFFICULT AT ALL
2. NOT BEING ABLE TO STOP OR CONTROL WORRYING: NOT AT ALL
2. NOT BEING ABLE TO STOP OR CONTROL WORRYING: NOT AT ALL
GAD7 TOTAL SCORE: 0
7. FEELING AFRAID AS IF SOMETHING AWFUL MIGHT HAPPEN: NOT AT ALL
3. WORRYING TOO MUCH ABOUT DIFFERENT THINGS: NOT AT ALL
4. TROUBLE RELAXING: NOT AT ALL
5. BEING SO RESTLESS THAT IT IS HARD TO SIT STILL: NOT AT ALL

## 2024-04-02 ASSESSMENT — PATIENT HEALTH QUESTIONNAIRE - PHQ9
SUM OF ALL RESPONSES TO PHQ9 QUESTIONS 1 & 2: 0
SUM OF ALL RESPONSES TO PHQ QUESTIONS 1-9: 0
1. LITTLE INTEREST OR PLEASURE IN DOING THINGS: NOT AT ALL
SUM OF ALL RESPONSES TO PHQ QUESTIONS 1-9: 0
2. FEELING DOWN, DEPRESSED OR HOPELESS: NOT AT ALL

## 2024-04-02 ASSESSMENT — LIFESTYLE VARIABLES
HOW OFTEN DO YOU HAVE A DRINK CONTAINING ALCOHOL: 2-4 TIMES A MONTH
HOW MANY STANDARD DRINKS CONTAINING ALCOHOL DO YOU HAVE ON A TYPICAL DAY: 1
HOW OFTEN DO YOU HAVE SIX OR MORE DRINKS ON ONE OCCASION: 1
HOW OFTEN DO YOU HAVE A DRINK CONTAINING ALCOHOL: 3
HOW MANY STANDARD DRINKS CONTAINING ALCOHOL DO YOU HAVE ON A TYPICAL DAY: 1 OR 2

## 2024-04-02 NOTE — PROGRESS NOTES
[facility] [place] [facility] PRIMARY CARE  [address] [facility] [address]  Dept: [phone]  Dept Fax: [phone]   DATE OF VISIT : 2024      Patient:  Gus Cannon  Age: 68 y.o.       : 1955      Chief complaint:   Chief Complaint   Patient presents with    hospitals Care    Hypertension    Discuss Medications     Switch from Advair to Wixela  - due to cost          History of Present Illness     Gus Cannon is a 68 y.o. male who presented to the clinic today for discuss medications    Patient has a past medical history of emphysema for which she has been on Advair for some time.  As of recent he has noticed a increase in overall price of Advair and wishes to switch to a different medication.  Per his insurance patient wishes to be on Wixela for which is currently covered and would be cheaper.  He reports overall emphysema being well-controlled and denies any worsening shortness of breath, chest pain, abdominal pain.  Patient is a former smoker and has a total of 67-pack-year smoking.  Patient currently requires 2 L nasal cannula of oxygen continuously.    Medication List:    Current Outpatient Medications   Medication Sig Dispense Refill    fluticasone-salmeterol (WIXELA INHUB) 100-50 MCG/ACT AEPB diskus inhaler Inhale 1 puff into the lungs in the morning and 1 puff in the evening. 1 each 4    rosuvastatin (CRESTOR) 10 MG tablet Take 1 tablet by mouth daily 90 tablet 1    gabapentin (NEURONTIN) 300 MG capsule Take 1 capsule by mouth 3 times daily for 180 days. Intended supply: 90 days 270 capsule 1    ipratropium 0.5 mg-albuterol 2.5 mg (DUONEB) 0.5-2.5 (3) MG/3ML SOLN nebulizer solution Take 3 mLs by nebulization every 6 hours as needed for Shortness of Breath 360 mL 3    DILT- MG extended release capsule Take 1 capsule by mouth daily 90 capsule 3    albuterol sulfate HFA (PROAIR HFA) 108 (90 Base) MCG/ACT inhaler Inhale 2 puffs into the lungs every 6 hours as

## 2024-07-02 ENCOUNTER — OFFICE VISIT (OUTPATIENT)
Dept: PRIMARY CARE CLINIC | Age: 69
End: 2024-07-02
Payer: MEDICARE

## 2024-07-02 VITALS
RESPIRATION RATE: 18 BRPM | DIASTOLIC BLOOD PRESSURE: 70 MMHG | HEART RATE: 72 BPM | TEMPERATURE: 97.3 F | SYSTOLIC BLOOD PRESSURE: 120 MMHG | OXYGEN SATURATION: 92 % | HEIGHT: 67 IN | BODY MASS INDEX: 27.62 KG/M2 | WEIGHT: 176 LBS

## 2024-07-02 DIAGNOSIS — I47.19 MULTIFOCAL ATRIAL TACHYCARDIA (HCC): ICD-10-CM

## 2024-07-02 DIAGNOSIS — F17.211 CIGARETTE NICOTINE DEPENDENCE IN REMISSION: ICD-10-CM

## 2024-07-02 DIAGNOSIS — Z00.00 MEDICARE ANNUAL WELLNESS VISIT, SUBSEQUENT: Primary | ICD-10-CM

## 2024-07-02 PROCEDURE — G0296 VISIT TO DETERM LDCT ELIG: HCPCS | Performed by: STUDENT IN AN ORGANIZED HEALTH CARE EDUCATION/TRAINING PROGRAM

## 2024-07-02 PROCEDURE — 3078F DIAST BP <80 MM HG: CPT | Performed by: STUDENT IN AN ORGANIZED HEALTH CARE EDUCATION/TRAINING PROGRAM

## 2024-07-02 PROCEDURE — 1123F ACP DISCUSS/DSCN MKR DOCD: CPT | Performed by: STUDENT IN AN ORGANIZED HEALTH CARE EDUCATION/TRAINING PROGRAM

## 2024-07-02 PROCEDURE — G0439 PPPS, SUBSEQ VISIT: HCPCS | Performed by: STUDENT IN AN ORGANIZED HEALTH CARE EDUCATION/TRAINING PROGRAM

## 2024-07-02 PROCEDURE — 3074F SYST BP LT 130 MM HG: CPT | Performed by: STUDENT IN AN ORGANIZED HEALTH CARE EDUCATION/TRAINING PROGRAM

## 2024-07-02 SDOH — ECONOMIC STABILITY: FOOD INSECURITY: WITHIN THE PAST 12 MONTHS, YOU WORRIED THAT YOUR FOOD WOULD RUN OUT BEFORE YOU GOT MONEY TO BUY MORE.: NEVER TRUE

## 2024-07-02 SDOH — ECONOMIC STABILITY: FOOD INSECURITY: WITHIN THE PAST 12 MONTHS, THE FOOD YOU BOUGHT JUST DIDN'T LAST AND YOU DIDN'T HAVE MONEY TO GET MORE.: NEVER TRUE

## 2024-07-02 SDOH — ECONOMIC STABILITY: INCOME INSECURITY: HOW HARD IS IT FOR YOU TO PAY FOR THE VERY BASICS LIKE FOOD, HOUSING, MEDICAL CARE, AND HEATING?: NOT VERY HARD

## 2024-07-02 NOTE — PROGRESS NOTES
Medicare Annual Wellness Visit    Gus Cannon is here for Medicare AWV and Orders (Replacement for nebulizer machine, and cup )    Assessment & Plan   Medicare annual wellness visit, subsequent  Multifocal atrial tachycardia (HCC)  Comments:  Stable.  Denies any chest pain or shortness of breath.  Cigarette nicotine dependence in remission  Comments:  Currently already has a lung CT scan plan order placed  Orders:  -     MO VISIT TO DISCUSS LUNG CA SCREEN W LDCT    Recommendations for Preventive Services Due: see orders and patient instructions/AVS.  Recommended screening schedule for the next 5-10 years is provided to the patient in written form: see Patient Instructions/AVS.     Return in 6 months (on 1/2/2025) for COPD, Medicare Annual Wellness Visit in 1 year.     Subjective   The following acute and/or chronic problems were also addressed today:   MAC-patient denies any chest pain or heart palpitations.  He continues to take his the as prescribed DILT-xr.  Continues to follow-up with Dr. Rebolledo (cardiologist).        Patient's complete Health Risk Assessment and screening values have been reviewed and are found in Flowsheets. The following problems were reviewed today and where indicated follow up appointments were made and/or referrals ordered.    Positive Risk Factor Screenings with Interventions:               General HRA Questions:  Select all that apply: (!) New or Increased Fatigue  Activity, Diet, and Weight:  On average, how many days per week do you engage in moderate to strenuous exercise (like a brisk walk)?: 0 days  On average, how many minutes do you engage in exercise at this level?: 0 min    Do you eat balanced/healthy meals regularly?: Yes    Body mass index is 27.57 kg/m².      Inactivity Interventions:  See AVS for additional education material          Vision Screen:  Do you have difficulty driving, watching TV, or doing any of your daily activities because of your eyesight?: (!)

## 2024-07-02 NOTE — PATIENT INSTRUCTIONS
cataracts, macular degeneration, and other eye disorders.  A preventive dental visit is recommended every 6 months.  Try to get at least 150 minutes of exercise per week or 10,000 steps per day on a pedometer .  Order or download the FREE \"Exercise & Physical Activity: Your Everyday Guide\" from [employer]. Call 1-799.555.1776 or search [employer] online.  You need 8808-6422 mg of calcium and 2589-0539 IU of vitamin D per day. It is possible to meet your calcium requirement with diet alone, but a vitamin D supplement is usually necessary to meet this goal.  When exposed to the sun, use a sunscreen that protects against both UVA and UVB radiation with an SPF of 30 or greater. Reapply every 2 to 3 hours or after sweating, drying off with a towel, or swimming.  Always wear a seat belt when traveling in a car. Always wear a helmet when riding a bicycle or motorcycle.

## 2024-07-11 ENCOUNTER — TELEPHONE (OUTPATIENT)
Dept: PRIMARY CARE CLINIC | Age: 69
End: 2024-07-11

## 2024-07-11 NOTE — TELEPHONE ENCOUNTER
Pt called in asking about his order for his nebulizer machine. He stated that [facility] sent you an order for it and they have not received it back.  He is wondering if you received this and if you can send it in.  His pump is now going and he also needs the cup to go with it.  He stated can you please send this in.  [facility] stated they sent it to [place] office.  He stated that you have specify on it that he needs a replacement since other one was bad.    Pt is requesting a call back when order is placed and let know what is going on.

## 2024-07-12 NOTE — TELEPHONE ENCOUNTER
This was faxed from [place] on 6/19, unsure if it went through as there is no fax confirmation scanned in.   I faxed it to [facility] again.    minimum assist (75% patients effort)

## 2024-07-25 ENCOUNTER — TELEPHONE (OUTPATIENT)
Dept: PRIMARY CARE CLINIC | Age: 69
End: 2024-07-25

## 2024-07-25 DIAGNOSIS — J43.8 OTHER EMPHYSEMA (HCC): Primary | ICD-10-CM

## 2024-07-25 NOTE — TELEPHONE ENCOUNTER
Order faxed    
Order placed. Please fax over    
Pt called in again today about his Nebulizer Compressor.  He spoke with [facility] and they stated you have not sent over new script for him to get a new one.    Pt is requesting that you send a Prescription to [facility] for a Nebulizer Compressor.  He stated that [facility] told him on the prescription it has to state it is a replacement.  He stated his is going bad and this is not the first time he has left msg for this.    Pt stated Please Send new prescription for this.    
3

## 2024-07-30 DIAGNOSIS — M54.50 LUMBAR PAIN: ICD-10-CM

## 2024-07-30 RX ORDER — MELOXICAM 7.5 MG/1
7.5 TABLET ORAL DAILY PRN
Qty: 30 TABLET | Refills: 5 | Status: SHIPPED | OUTPATIENT
Start: 2024-07-30

## 2024-07-30 NOTE — TELEPHONE ENCOUNTER
Name of Medication(s) Requested:  meloxicam    Pharmacy Requested:   [facility]    Medication(s) pended?   [x] Yes  [] No    Last Appointment:  7/2/2024    Future appts:  Future Appointments   Date Time Provider Department Center   1/7/2025 10:00 AM [facility]   5/20/2025 11:15 AM Isrrael Fuentes MD [facility]          Does patient need call back?  [] Yes  [x] No

## 2024-08-22 DIAGNOSIS — M54.16 LUMBAR RADICULOPATHY: ICD-10-CM

## 2024-08-23 RX ORDER — GABAPENTIN 300 MG/1
300 CAPSULE ORAL 3 TIMES DAILY
Qty: 270 CAPSULE | Refills: 1 | Status: SHIPPED | OUTPATIENT
Start: 2024-08-23 | End: 2025-02-19

## 2024-10-15 ENCOUNTER — OFFICE VISIT (OUTPATIENT)
Dept: PRIMARY CARE CLINIC | Age: 69
End: 2024-10-15
Payer: MEDICARE

## 2024-10-15 VITALS
TEMPERATURE: 97.4 F | OXYGEN SATURATION: 94 % | SYSTOLIC BLOOD PRESSURE: 122 MMHG | DIASTOLIC BLOOD PRESSURE: 68 MMHG | RESPIRATION RATE: 18 BRPM | BODY MASS INDEX: 28.09 KG/M2 | HEIGHT: 67 IN | HEART RATE: 72 BPM | WEIGHT: 179 LBS

## 2024-10-15 DIAGNOSIS — I10 ESSENTIAL HYPERTENSION: ICD-10-CM

## 2024-10-15 DIAGNOSIS — R73.03 PREDIABETES: ICD-10-CM

## 2024-10-15 DIAGNOSIS — H25.89 OTHER AGE-RELATED CATARACT OF RIGHT EYE: ICD-10-CM

## 2024-10-15 DIAGNOSIS — Z23 NEED FOR INFLUENZA VACCINATION: ICD-10-CM

## 2024-10-15 DIAGNOSIS — E78.2 MIXED HYPERLIPIDEMIA: ICD-10-CM

## 2024-10-15 DIAGNOSIS — E78.2 MIXED HYPERLIPIDEMIA: Primary | ICD-10-CM

## 2024-10-15 LAB
ANION GAP SERPL CALCULATED.3IONS-SCNC: 10 MMOL/L (ref 7–16)
BUN BLDV-MCNC: 9 MG/DL (ref 6–23)
CALCIUM SERPL-MCNC: 9.5 MG/DL (ref 8.6–10.2)
CHLORIDE BLD-SCNC: 104 MMOL/L (ref 98–107)
CHOLESTEROL, TOTAL: 134 MG/DL
CO2: 28 MMOL/L (ref 22–29)
CREAT SERPL-MCNC: 0.8 MG/DL (ref 0.7–1.2)
GFR, ESTIMATED: >90 ML/MIN/1.73M2
GLUCOSE BLD-MCNC: 101 MG/DL (ref 74–99)
HBA1C MFR BLD: 5.7 %
HCT VFR BLD CALC: 43.1 % (ref 37–54)
HDLC SERPL-MCNC: 52 MG/DL
HEMOGLOBIN: 13.9 G/DL (ref 12.5–16.5)
LDL CHOLESTEROL: 60 MG/DL
MCH RBC QN AUTO: 31.5 PG (ref 26–35)
MCHC RBC AUTO-ENTMCNC: 32.3 G/DL (ref 32–34.5)
MCV RBC AUTO: 97.7 FL (ref 80–99.9)
PDW BLD-RTO: 13.6 % (ref 11.5–15)
PLATELET # BLD: 223 K/UL (ref 130–450)
PMV BLD AUTO: 10.7 FL (ref 7–12)
POTASSIUM SERPL-SCNC: 4.9 MMOL/L (ref 3.5–5)
RBC # BLD: 4.41 M/UL (ref 3.8–5.8)
SODIUM BLD-SCNC: 142 MMOL/L (ref 132–146)
TRIGL SERPL-MCNC: 109 MG/DL
VLDLC SERPL CALC-MCNC: 22 MG/DL
WBC # BLD: 6.3 K/UL (ref 4.5–11.5)

## 2024-10-15 PROCEDURE — 83036 HEMOGLOBIN GLYCOSYLATED A1C: CPT | Performed by: STUDENT IN AN ORGANIZED HEALTH CARE EDUCATION/TRAINING PROGRAM

## 2024-10-15 PROCEDURE — 3078F DIAST BP <80 MM HG: CPT | Performed by: STUDENT IN AN ORGANIZED HEALTH CARE EDUCATION/TRAINING PROGRAM

## 2024-10-15 PROCEDURE — 1123F ACP DISCUSS/DSCN MKR DOCD: CPT | Performed by: STUDENT IN AN ORGANIZED HEALTH CARE EDUCATION/TRAINING PROGRAM

## 2024-10-15 PROCEDURE — 99214 OFFICE O/P EST MOD 30 MIN: CPT | Performed by: STUDENT IN AN ORGANIZED HEALTH CARE EDUCATION/TRAINING PROGRAM

## 2024-10-15 PROCEDURE — G0008 ADMIN INFLUENZA VIRUS VAC: HCPCS | Performed by: STUDENT IN AN ORGANIZED HEALTH CARE EDUCATION/TRAINING PROGRAM

## 2024-10-15 PROCEDURE — 90653 IIV ADJUVANT VACCINE IM: CPT | Performed by: STUDENT IN AN ORGANIZED HEALTH CARE EDUCATION/TRAINING PROGRAM

## 2024-10-15 PROCEDURE — 3074F SYST BP LT 130 MM HG: CPT | Performed by: STUDENT IN AN ORGANIZED HEALTH CARE EDUCATION/TRAINING PROGRAM

## 2024-10-15 RX ORDER — ROSUVASTATIN CALCIUM 10 MG/1
10 TABLET, COATED ORAL DAILY
Qty: 90 TABLET | Refills: 1 | Status: SHIPPED | OUTPATIENT
Start: 2024-10-15

## 2024-10-15 ASSESSMENT — ENCOUNTER SYMPTOMS
SHORTNESS OF BREATH: 0
VOMITING: 0
NAUSEA: 0
ABDOMINAL PAIN: 0
CONSTIPATION: 0
WHEEZING: 0

## 2024-10-15 NOTE — PROGRESS NOTES
[facility] [place]  PRIMARY CARE  [address] [facility] [address]  Dept: [phone]  Dept Fax: [phone]   DATE OF VISIT : 10/15/2024        Patient:  Gus Cannon 69 y.o. male      Chief complaint:   Chief Complaint   Patient presents with    Pre-op Exam    COPD         History of Present Illness   The patient is a 69 y.o. male  presented to theclinic with complaints as above.    Patient is here by request of Dr. Gutierrez who has upcoming cataracts surgery scheduled with patient.  He has no complaints or concerns today.  He is  generally healthy.  Chronic medical problems include COPD, HTN, MAT, HLD.  These are generally controlled and stable at this time. Most recent labs reviewed with patient and  are remarkable, will repeat BMP and CBC at this time to rule out anemia and/or LILI.  Patient does not currently smoke.  Patient does not drink alcohol.  Patient  does not use drugs.  Overall doing well.  Patient does not have chest pain, palpitations, shortness of breath, or orthopnea. Medical records have been reviewed today.      Patient was also recently diagnosed with prediabetes.  His hemoglobin A1c today 5.7.  He continues to be diet controlled.  Denies any headaches, dizziness or lightheadedness.  For his hyperlipidemia patient reports having well-balanced diet.  Continues to take Crestor as prescribed.  No major weight changes since his last office visit.  His overall blood pressure is well-controlled today.  He continues to take diltiazem as prescribed.      Reports no prior issues anesthesia    No aversion to receiving blood products if needed      Past Medical History:      Diagnosis Date    Alcohol abuse     Arthritis     COPD (chronic obstructive pulmonary disease) (HCC)     Hematochezia due to medication 12/9/2019    Multifocal atrial tachycardia (HCC)     paroxysmal    Nicotine dependence        Past Surgical History:        Procedure Laterality Date    COLONOSCOPY

## 2024-11-12 DIAGNOSIS — J43.8 OTHER EMPHYSEMA (HCC): ICD-10-CM

## 2024-11-12 RX ORDER — FLUTICASONE PROPIONATE AND SALMETEROL 100; 50 UG/1; UG/1
1 POWDER RESPIRATORY (INHALATION) EVERY 12 HOURS
Qty: 1 EACH | Refills: 4 | Status: SHIPPED | OUTPATIENT
Start: 2024-11-12

## 2025-01-08 DIAGNOSIS — I47.19 MULTIFOCAL ATRIAL TACHYCARDIA (HCC): ICD-10-CM

## 2025-01-08 RX ORDER — DILTIAZEM HYDROCHLORIDE 120 MG/1
120 CAPSULE, EXTENDED RELEASE ORAL DAILY
Qty: 90 CAPSULE | Refills: 3 | Status: SHIPPED | OUTPATIENT
Start: 2025-01-08 | End: 2026-01-03

## 2025-01-08 NOTE — TELEPHONE ENCOUNTER
Pt calling in, needs a refill on his DILT- MG, Dr Brambila was the last one to fill it. It's in his medication history. If you are willing to refill it can you please send it to [facility] in [place].

## 2025-01-23 ENCOUNTER — OFFICE VISIT (OUTPATIENT)
Dept: PRIMARY CARE CLINIC | Age: 70
End: 2025-01-23
Payer: MEDICARE

## 2025-01-23 VITALS
DIASTOLIC BLOOD PRESSURE: 68 MMHG | HEART RATE: 84 BPM | OXYGEN SATURATION: 94 % | HEIGHT: 67 IN | SYSTOLIC BLOOD PRESSURE: 128 MMHG | WEIGHT: 181 LBS | TEMPERATURE: 97.4 F | BODY MASS INDEX: 28.41 KG/M2 | RESPIRATION RATE: 18 BRPM

## 2025-01-23 DIAGNOSIS — M54.16 LUMBAR RADICULOPATHY: ICD-10-CM

## 2025-01-23 DIAGNOSIS — J43.8 OTHER EMPHYSEMA (HCC): Primary | ICD-10-CM

## 2025-01-23 PROCEDURE — 1159F MED LIST DOCD IN RCRD: CPT | Performed by: STUDENT IN AN ORGANIZED HEALTH CARE EDUCATION/TRAINING PROGRAM

## 2025-01-23 PROCEDURE — G2211 COMPLEX E/M VISIT ADD ON: HCPCS | Performed by: STUDENT IN AN ORGANIZED HEALTH CARE EDUCATION/TRAINING PROGRAM

## 2025-01-23 PROCEDURE — 3074F SYST BP LT 130 MM HG: CPT | Performed by: STUDENT IN AN ORGANIZED HEALTH CARE EDUCATION/TRAINING PROGRAM

## 2025-01-23 PROCEDURE — 99214 OFFICE O/P EST MOD 30 MIN: CPT | Performed by: STUDENT IN AN ORGANIZED HEALTH CARE EDUCATION/TRAINING PROGRAM

## 2025-01-23 PROCEDURE — 3078F DIAST BP <80 MM HG: CPT | Performed by: STUDENT IN AN ORGANIZED HEALTH CARE EDUCATION/TRAINING PROGRAM

## 2025-01-23 PROCEDURE — 1123F ACP DISCUSS/DSCN MKR DOCD: CPT | Performed by: STUDENT IN AN ORGANIZED HEALTH CARE EDUCATION/TRAINING PROGRAM

## 2025-01-23 RX ORDER — GABAPENTIN 300 MG/1
300 CAPSULE ORAL 3 TIMES DAILY
Qty: 270 CAPSULE | Refills: 1 | Status: SHIPPED | OUTPATIENT
Start: 2025-01-23 | End: 2025-07-22

## 2025-01-23 RX ORDER — ALBUTEROL SULFATE 90 UG/1
2 INHALANT RESPIRATORY (INHALATION) EVERY 6 HOURS PRN
Qty: 18 G | Refills: 3 | Status: SHIPPED | OUTPATIENT
Start: 2025-01-23

## 2025-01-23 SDOH — ECONOMIC STABILITY: FOOD INSECURITY: WITHIN THE PAST 12 MONTHS, THE FOOD YOU BOUGHT JUST DIDN'T LAST AND YOU DIDN'T HAVE MONEY TO GET MORE.: NEVER TRUE

## 2025-01-23 SDOH — ECONOMIC STABILITY: FOOD INSECURITY: WITHIN THE PAST 12 MONTHS, YOU WORRIED THAT YOUR FOOD WOULD RUN OUT BEFORE YOU GOT MONEY TO BUY MORE.: NEVER TRUE

## 2025-01-23 ASSESSMENT — PATIENT HEALTH QUESTIONNAIRE - PHQ9
1. LITTLE INTEREST OR PLEASURE IN DOING THINGS: SEVERAL DAYS
SUM OF ALL RESPONSES TO PHQ QUESTIONS 1-9: 2
SUM OF ALL RESPONSES TO PHQ QUESTIONS 1-9: 2
SUM OF ALL RESPONSES TO PHQ9 QUESTIONS 1 & 2: 2
SUM OF ALL RESPONSES TO PHQ QUESTIONS 1-9: 2
SUM OF ALL RESPONSES TO PHQ QUESTIONS 1-9: 2

## 2025-01-23 NOTE — PROGRESS NOTES
inhaler, albuterol, gabapentin      Medication List:    Current Outpatient Medications   Medication Sig Dispense Refill    gabapentin (NEURONTIN) 300 MG capsule Take 1 capsule by mouth 3 times daily for 180 days. Intended supply: 90 days 270 capsule 1    albuterol sulfate HFA (PROAIR HFA) 108 (90 Base) MCG/ACT inhaler Inhale 2 puffs into the lungs every 6 hours as needed for Wheezing Inhale into the lungs 18 g 3    DILT- MG extended release capsule Take 1 capsule by mouth daily 90 capsule 3    fluticasone-salmeterol (WIXELA INHUB) 100-50 MCG/ACT AEPB diskus inhaler Inhale 1 puff into the lungs in the morning and 1 puff in the evening. 1 each 4    rosuvastatin (CRESTOR) 10 MG tablet Take 1 tablet by mouth daily 90 tablet 1    meloxicam (MOBIC) 7.5 MG tablet Take 1 tablet by mouth daily as needed for Pain 30 tablet 5    Nebulizers (COMPRESSOR/NEBULIZER) MISC 1 Device by Does not apply route daily Replacement Nebulizer Compressor 1 each 3    ipratropium 0.5 mg-albuterol 2.5 mg (DUONEB) 0.5-2.5 (3) MG/3ML SOLN nebulizer solution Take 3 mLs by nebulization every 6 hours as needed for Shortness of Breath 360 mL 3    OXYGEN Inhale 2 L into the lungs as needed       No current facility-administered medications for this visit.            ROS   Reviewed as above, otherwise negative       Physical Exam   Vitals:   Vitals:    01/23/25 1458   BP: 128/68   Pulse: 84   Resp: 18   Temp: 97.4 °F (36.3 °C)   SpO2: 94%       Physical Exam  Vitals reviewed.   Constitutional:       Appearance: Normal appearance.   HENT:      Head: Normocephalic and atraumatic.   Cardiovascular:      Rate and Rhythm: Normal rate and regular rhythm.      Pulses: Normal pulses.      Heart sounds: Normal heart sounds.   Pulmonary:      Effort: Pulmonary effort is normal.      Breath sounds: Wheezing present.   Abdominal:      General: Bowel sounds are normal.      Palpations: Abdomen is soft.   Neurological:      Mental Status: He is alert.

## 2025-03-10 DIAGNOSIS — J43.9 PULMONARY EMPHYSEMA, UNSPECIFIED EMPHYSEMA TYPE (HCC): ICD-10-CM

## 2025-03-10 DIAGNOSIS — J43.8 OTHER EMPHYSEMA (HCC): ICD-10-CM

## 2025-03-10 RX ORDER — IPRATROPIUM BROMIDE AND ALBUTEROL SULFATE 2.5; .5 MG/3ML; MG/3ML
1 SOLUTION RESPIRATORY (INHALATION) EVERY 6 HOURS PRN
Qty: 360 ML | Refills: 3 | Status: SHIPPED | OUTPATIENT
Start: 2025-03-10

## 2025-03-10 NOTE — TELEPHONE ENCOUNTER
Name of Medication(s) Requested:  Requested Prescriptions     Pending Prescriptions Disp Refills    ipratropium 0.5 mg-albuterol 2.5 mg (DUONEB) 0.5-2.5 (3) MG/3ML SOLN nebulizer solution 360 mL 3     Sig: Take 3 mLs by nebulization every 6 hours as needed for Shortness of Breath       Medication is on current medication list Yes    Dosage and directions were verified? Yes    Quantity verified: 90 day supply     Pharmacy Verified?  Yes    Last Appointment:  1/23/2025    Future appts:  Future Appointments   Date Time Provider Department Center   5/20/2025 11:15 AM Isrrael Fuentes MD [facility]   7/24/2025  3:00 PM Moe Quiroz MD [facility]        (If no appt send self scheduling link. .REFILLAPPT)  Scheduling request sent?     [] Yes  [x] No    Does patient need updated?  [] Yes  [x] No

## 2025-03-28 ENCOUNTER — OFFICE VISIT (OUTPATIENT)
Dept: FAMILY MEDICINE CLINIC | Age: 70
End: 2025-03-28

## 2025-03-28 VITALS
HEART RATE: 94 BPM | HEIGHT: 67 IN | SYSTOLIC BLOOD PRESSURE: 124 MMHG | BODY MASS INDEX: 27.81 KG/M2 | RESPIRATION RATE: 18 BRPM | OXYGEN SATURATION: 90 % | TEMPERATURE: 97.5 F | WEIGHT: 177.2 LBS | DIASTOLIC BLOOD PRESSURE: 66 MMHG

## 2025-03-28 DIAGNOSIS — R05.9 COUGH, UNSPECIFIED TYPE: ICD-10-CM

## 2025-03-28 DIAGNOSIS — R06.02 SHORTNESS OF BREATH: Primary | ICD-10-CM

## 2025-03-28 DIAGNOSIS — R09.02 HYPOXIA: ICD-10-CM

## 2025-03-28 DIAGNOSIS — R09.81 CONGESTION OF NASAL SINUS: ICD-10-CM

## 2025-03-28 LAB
INFLUENZA A ANTIBODY: NEGATIVE
INFLUENZA B ANTIBODY: NEGATIVE
Lab: NORMAL
PERFORMING INSTRUMENT: NORMAL
QC PASS/FAIL: NORMAL
RSV RNA: NEGATIVE
SARS-COV-2, POC: NORMAL

## 2025-03-28 RX ORDER — IPRATROPIUM BROMIDE AND ALBUTEROL SULFATE 2.5; .5 MG/3ML; MG/3ML
1 SOLUTION RESPIRATORY (INHALATION) ONCE
Status: COMPLETED | OUTPATIENT
Start: 2025-03-28 | End: 2025-03-28

## 2025-03-28 RX ADMIN — IPRATROPIUM BROMIDE AND ALBUTEROL SULFATE 1 DOSE: 2.5; .5 SOLUTION RESPIRATORY (INHALATION) at 14:44

## 2025-03-28 NOTE — PROGRESS NOTES
Chief Complaint:       Congestion and Cough (Started 4 days ago)    History of Present Illness   Source of history provided by:  patient.     Gus Cannon is a 69 y.o. old male who presents to walk-in with complaints of shortness of breath which began 4 days ago. pt states they have a cough, fatigue and mild nasal congestion.  Since onset the symptoms have been progressing.   Pt denies any CP, vomiting, abdominal pain, neck stiffness, or lethargy.  At home treatment has been unsuccessful.  The patient does have a history of COPD and does wear 2 L of oxygen at baseline.  Today at home his pulse ox post ambulation was in the 80s.  He reports that at baseline he does have mild shortness of breath with exertion but this feels worse than normal.  He reports that generally he does not become hypoxic from ambulating.  His baseline pulse ox at home runs 90 to 94% on 2 L.  After patient ambulated to the room he was 80% on his 2 L.  When he stood up to take off his coat he was 78% on 2 L.  Review of Systems   Unless otherwise stated in this report or unable to obtain because of the patient's clinical or mental status as evidenced by the medical record, this patients's positive and negative responses for Review of Systems, constitutional, psych, eyes, ENT, cardiovascular, respiratory, gastrointestinal, neurological, genitourinary, musculoskeletal, integument systems and systems related to the presenting problem are either stated in the preceding or were not pertinent or were negative for the symptoms and/or complaints related to the medical problem.    Past Medical History:  has a past medical history of Alcohol abuse, Arthritis, COPD (chronic obstructive pulmonary disease) (HCC), Hematochezia due to medication, Multifocal atrial tachycardia, and Nicotine dependence.   Past Surgical History:  has a past surgical history that includes Tonsillectomy; Colonoscopy; and Umbilical hernia repair (N/A, 2/1/2022).  Social History:

## 2025-04-03 ENCOUNTER — OFFICE VISIT (OUTPATIENT)
Dept: FAMILY MEDICINE CLINIC | Age: 70
End: 2025-04-03
Payer: MEDICARE

## 2025-04-03 VITALS
TEMPERATURE: 97.4 F | RESPIRATION RATE: 18 BRPM | BODY MASS INDEX: 27.78 KG/M2 | OXYGEN SATURATION: 93 % | SYSTOLIC BLOOD PRESSURE: 124 MMHG | HEIGHT: 67 IN | DIASTOLIC BLOOD PRESSURE: 70 MMHG | WEIGHT: 177 LBS | HEART RATE: 88 BPM

## 2025-04-03 DIAGNOSIS — B37.0 THRUSH: Primary | ICD-10-CM

## 2025-04-03 PROCEDURE — 1159F MED LIST DOCD IN RCRD: CPT | Performed by: NURSE PRACTITIONER

## 2025-04-03 PROCEDURE — 3074F SYST BP LT 130 MM HG: CPT | Performed by: NURSE PRACTITIONER

## 2025-04-03 PROCEDURE — 1123F ACP DISCUSS/DSCN MKR DOCD: CPT | Performed by: NURSE PRACTITIONER

## 2025-04-03 PROCEDURE — 1160F RVW MEDS BY RX/DR IN RCRD: CPT | Performed by: NURSE PRACTITIONER

## 2025-04-03 PROCEDURE — 3078F DIAST BP <80 MM HG: CPT | Performed by: NURSE PRACTITIONER

## 2025-04-03 PROCEDURE — 99213 OFFICE O/P EST LOW 20 MIN: CPT | Performed by: NURSE PRACTITIONER

## 2025-04-03 RX ORDER — PREDNISONE 10 MG/1
10 TABLET ORAL DAILY
COMMUNITY
Start: 2025-03-31

## 2025-04-03 RX ORDER — DOXYCYCLINE 100 MG/1
100 CAPSULE ORAL EVERY 12 HOURS
COMMUNITY
Start: 2025-03-31

## 2025-04-03 RX ORDER — FAMOTIDINE 20 MG/1
20 TABLET, FILM COATED ORAL 2 TIMES DAILY
COMMUNITY
Start: 2025-03-31

## 2025-04-03 RX ORDER — NYSTATIN 100000 [USP'U]/ML
500000 SUSPENSION ORAL 4 TIMES DAILY
Qty: 200 ML | Refills: 0 | Status: SHIPPED | OUTPATIENT
Start: 2025-04-03 | End: 2025-04-13

## 2025-04-03 RX ORDER — GUAIFENESIN 600 MG/1
600 TABLET, EXTENDED RELEASE ORAL 2 TIMES DAILY
COMMUNITY
Start: 2025-03-31

## 2025-04-03 NOTE — PROGRESS NOTES
4/3/25  Gus Cannon : 1955 Sex: male  Age 69 y.o.    Subjective:  Chief Complaint   Patient presents with    Medication Reaction       HPI:   Gus Cannon , 69 y.o. male presents to the clinic for evaluation tongue buring and tinglingx 1 day. The patient also reports difficulty swallowing. The patient is taking new medications for COPD exacerbation including: Prednisone, Pepcid, Doxycyline, Mucinex, Florajen. The patient reports unchanged symptoms over time. Denies any known cause for the rash including any new soaps, detergents, lotions, foods, or medications. Denies orbital edema, angioedema, throat tightness, wheezing, and shortness of breath. Denies fever, headache, chest pain, abdominal pain, and nausea / vomiting / diarrhea.    ROS:   Unless otherwise stated in this report the patient's positive and negative responses for review of systems for constitutional, eyes, ENT, cardiovascular, respiratory, gastrointestinal, neurological, , musculoskeletal, and integument systems and related systems to the presenting problem are either stated in the history of present illness or were not pertinent or were negative for the symptoms and/or complaints related to the presenting medical problem.  Positives and pertinent negatives as per HPI.  All others reviewed and are negative.      PMH:     Past Medical History:   Diagnosis Date    Alcohol abuse     Arthritis     COPD (chronic obstructive pulmonary disease) (HCC)     Hematochezia due to medication 2019    Multifocal atrial tachycardia     paroxysmal    Nicotine dependence        Past Surgical History:   Procedure Laterality Date    COLONOSCOPY      TONSILLECTOMY      UMBILICAL HERNIA REPAIR N/A 2022    OPEN UMBILICAL HERNIA REPAIR WITH MESH performed by Rajinder Alvarez DO at [facility] OR       Family History   Problem Relation Age of Onset    No Known Problems Mother     Heart Attack Father        Medications:     Current Outpatient Medications:     
History     Tobacco Use    Smoking status: Former     Current packs/day: 0.00     Average packs/day: 1.5 packs/day for 45.0 years (67.5 ttl pk-yrs)     Types: Cigarettes     Start date: 1973     Quit date: 2018     Years since quittin.2    Smokeless tobacco: Never   Vaping Use    Vaping status: Never Used   Substance Use Topics    Alcohol use: Yes     Alcohol/week: 4.0 standard drinks of alcohol     Types: 4 Cans of beer per week    Drug use: Never       Physical Exam:     Vitals:    25 1133 25 1151   BP: 124/70    Pulse: 88    Resp: 20 18   Temp: 97.4 °F (36.3 °C)    TempSrc: Temporal    SpO2: Comment: 2 liters nc 93%  Comment: 2l NC   Weight: 80.3 kg (177 lb)    Height: 1.702 m (5' 7\")        Physical Exam (PE)    Physical Exam  Constitutional:       Appearance: Normal appearance.   HENT:      Head: Normocephalic.      Right Ear: External ear normal.      Left Ear: External ear normal.      Nose: Nose normal.      Mouth/Throat:      Mouth: Mucous membranes are moist.      Pharynx: Oropharynx is clear. Uvula midline. No pharyngeal swelling, posterior oropharyngeal erythema or uvula swelling.      Comments: White scrappable plaque noted to tongue and inner cheeks.  Eyes:      Pupils: Pupils are equal, round, and reactive to light.   Cardiovascular:      Rate and Rhythm: Normal rate and regular rhythm.      Pulses: Normal pulses.      Heart sounds: Normal heart sounds.   Pulmonary:      Effort: Pulmonary effort is normal. No respiratory distress.      Breath sounds: No stridor. Decreased breath sounds present. No wheezing, rhonchi or rales.   Abdominal:      General: Bowel sounds are normal.      Palpations: Abdomen is soft.   Musculoskeletal:         General: Normal range of motion.      Cervical back: Normal range of motion and neck supple.   Skin:     General: Skin is warm and dry.      Capillary Refill: Capillary refill takes less than 2 seconds.      Findings: No rash.   Neurological:

## 2025-04-08 ENCOUNTER — OFFICE VISIT (OUTPATIENT)
Dept: PRIMARY CARE CLINIC | Age: 70
End: 2025-04-08
Payer: MEDICARE

## 2025-04-08 VITALS
OXYGEN SATURATION: 93 % | WEIGHT: 182 LBS | SYSTOLIC BLOOD PRESSURE: 124 MMHG | HEIGHT: 67 IN | BODY MASS INDEX: 28.56 KG/M2 | HEART RATE: 94 BPM | DIASTOLIC BLOOD PRESSURE: 62 MMHG | TEMPERATURE: 97.4 F

## 2025-04-08 DIAGNOSIS — R73.9 HYPERGLYCEMIA: ICD-10-CM

## 2025-04-08 DIAGNOSIS — J43.8 OTHER EMPHYSEMA (HCC): Primary | ICD-10-CM

## 2025-04-08 DIAGNOSIS — K21.9 GASTROESOPHAGEAL REFLUX DISEASE WITHOUT ESOPHAGITIS: ICD-10-CM

## 2025-04-08 LAB — HBA1C MFR BLD: 6 %

## 2025-04-08 PROCEDURE — 3074F SYST BP LT 130 MM HG: CPT | Performed by: STUDENT IN AN ORGANIZED HEALTH CARE EDUCATION/TRAINING PROGRAM

## 2025-04-08 PROCEDURE — 1159F MED LIST DOCD IN RCRD: CPT | Performed by: STUDENT IN AN ORGANIZED HEALTH CARE EDUCATION/TRAINING PROGRAM

## 2025-04-08 PROCEDURE — 3078F DIAST BP <80 MM HG: CPT | Performed by: STUDENT IN AN ORGANIZED HEALTH CARE EDUCATION/TRAINING PROGRAM

## 2025-04-08 PROCEDURE — G2211 COMPLEX E/M VISIT ADD ON: HCPCS | Performed by: STUDENT IN AN ORGANIZED HEALTH CARE EDUCATION/TRAINING PROGRAM

## 2025-04-08 PROCEDURE — 83036 HEMOGLOBIN GLYCOSYLATED A1C: CPT | Performed by: STUDENT IN AN ORGANIZED HEALTH CARE EDUCATION/TRAINING PROGRAM

## 2025-04-08 PROCEDURE — 99214 OFFICE O/P EST MOD 30 MIN: CPT | Performed by: STUDENT IN AN ORGANIZED HEALTH CARE EDUCATION/TRAINING PROGRAM

## 2025-04-08 PROCEDURE — 1123F ACP DISCUSS/DSCN MKR DOCD: CPT | Performed by: STUDENT IN AN ORGANIZED HEALTH CARE EDUCATION/TRAINING PROGRAM

## 2025-04-08 RX ORDER — FAMOTIDINE 20 MG/1
20 TABLET, FILM COATED ORAL 2 TIMES DAILY
Qty: 60 TABLET | Refills: 0
Start: 2025-04-08

## 2025-04-08 NOTE — PROGRESS NOTES
[facility]  [address] [place] [id number]  Dept: [phone]  Dept Fax: [phone]   DATE OF VISIT : 2025      Patient:  Gus Cannon  Age: 69 y.o.       : 1955      Chief complaint:   Chief Complaint   Patient presents with   • COPD         History of Present Illness       History of Present Illness  The patient presents for evaluation of COPD exacerbation, oral thrush, heartburn, and prediabetes.    A recent hospitalization was required due to a COPD exacerbation, during which treatment included steroids and antibiotics. The antibiotic course has been completed, and one dose of prednisone remains. Current oxygen therapy is set at 2 liters, consistent with pre-hospitalization levels. No adverse effects such as nausea, vomiting, or diarrhea from the antibiotic treatment are reported. The treatment regimen includes Wixela, a nebulizer, and albuterol. No medication refills are needed at this time.    An episode of oral thrush occurred, attributed to the medication regimen, which was a new occurrence. Improvement is noted, but persistent redness and slight swelling of the tongue remain, along with sensitivity to salt.    Heartburn was experienced, believed to be a side effect of one of the medications. Famotidine was prescribed during the hospital stay, with one dose remaining. Typically, heartburn is not an issue, and Tums are used for relief when it occurs.    Diabetes has not been previously evaluated. A low-carbohydrate diet was advised but is challenging to adhere to.      Medication List:    Current Outpatient Medications   Medication Sig Dispense Refill   • famotidine (PEPCID) 20 MG tablet Take 1 tablet by mouth 2 times daily 60 tablet 0   • Probiotic Product CAPS Take 1 capsule by mouth     • EQ MUCUS RELIEF 600 MG extended release tablet Take 1 tablet by mouth 2 times daily     • nystatin (MYCOSTATIN) 653471 UNIT/ML suspension Take 5

## 2025-05-05 DIAGNOSIS — E78.2 MIXED HYPERLIPIDEMIA: ICD-10-CM

## 2025-05-05 RX ORDER — ROSUVASTATIN CALCIUM 10 MG/1
10 TABLET, COATED ORAL DAILY
Qty: 90 TABLET | Refills: 1 | Status: SHIPPED | OUTPATIENT
Start: 2025-05-05

## 2025-05-05 NOTE — TELEPHONE ENCOUNTER
Name of Medication(s) Requested:  Requested Prescriptions     Pending Prescriptions Disp Refills    rosuvastatin (CRESTOR) 10 MG tablet 90 tablet 1     Sig: Take 1 tablet by mouth daily       Medication is on current medication list Yes    Dosage and directions were verified? Yes    Quantity verified: 90 day supply     Pharmacy Verified?  Yes    Last Appointment:  4/8/2025    Future appts:  Future Appointments   Date Time Provider Department Center   5/27/2025 10:15 AM Isrrael Fuentes MD [facility]   7/24/2025  3:00 PM [facility]        (If no appt send self scheduling link. .REFILLAPPT)  Scheduling request sent?     [] Yes  [x] No    Does patient need updated?  [] Yes  [x] No

## 2025-05-27 PROBLEM — R91.8 LUNG NODULES: Status: ACTIVE | Noted: 2019-12-09

## 2025-06-10 ENCOUNTER — TELEPHONE (OUTPATIENT)
Dept: PRIMARY CARE CLINIC | Age: 70
End: 2025-06-10

## 2025-06-10 DIAGNOSIS — I87.2 VENOUS INSUFFICIENCY OF LOWER EXTREMITY: Primary | ICD-10-CM

## 2025-06-10 RX ORDER — FUROSEMIDE 20 MG/1
20 TABLET ORAL DAILY
Qty: 30 TABLET | Refills: 5 | Status: SHIPPED | OUTPATIENT
Start: 2025-06-10

## 2025-06-10 NOTE — TELEPHONE ENCOUNTER
Patient called and stated that Dr. Brambila had given him  Furosemide 20mg for swelling to take as needed and he was wondering if Dr. Quiroz could refill that for him. Because he has been having some swelling in his ankles.Patient said its an old script from a couple years back.  Patient also states he has not been taking the medication on his list of famotidine 20mg. Please call and advise

## 2025-06-19 DIAGNOSIS — J43.8 OTHER EMPHYSEMA (HCC): ICD-10-CM

## 2025-06-19 RX ORDER — FLUTICASONE PROPIONATE AND SALMETEROL 100; 50 UG/1; UG/1
1 POWDER RESPIRATORY (INHALATION) EVERY 12 HOURS
Qty: 1 EACH | Refills: 4 | Status: SHIPPED | OUTPATIENT
Start: 2025-06-19

## 2025-06-19 NOTE — TELEPHONE ENCOUNTER
Last Appointment:  4/8/2025  Future Appointments   Date Time Provider Department Center   7/24/2025  3:00 PM Moe Quiroz MD [facility]   5/19/2026 10:30 AM Isrrael Fuentes MD [facility]      Pended med for your review and signature.     Electronically signed by Mary Parson LPN on 6/19/2025 at 3:30 PM

## 2025-07-24 ENCOUNTER — OFFICE VISIT (OUTPATIENT)
Dept: PRIMARY CARE CLINIC | Age: 70
End: 2025-07-24
Payer: MEDICARE

## 2025-07-24 VITALS
TEMPERATURE: 98 F | RESPIRATION RATE: 18 BRPM | DIASTOLIC BLOOD PRESSURE: 78 MMHG | SYSTOLIC BLOOD PRESSURE: 124 MMHG | OXYGEN SATURATION: 91 % | BODY MASS INDEX: 27.47 KG/M2 | WEIGHT: 175 LBS | HEIGHT: 67 IN | HEART RATE: 87 BPM

## 2025-07-24 DIAGNOSIS — Z00.00 MEDICARE ANNUAL WELLNESS VISIT, SUBSEQUENT: ICD-10-CM

## 2025-07-24 DIAGNOSIS — M54.16 LUMBAR RADICULOPATHY: ICD-10-CM

## 2025-07-24 DIAGNOSIS — J43.8 OTHER EMPHYSEMA (HCC): ICD-10-CM

## 2025-07-24 DIAGNOSIS — R73.03 PREDIABETES: Primary | ICD-10-CM

## 2025-07-24 LAB — HBA1C MFR BLD: 5.5 %

## 2025-07-24 PROCEDURE — 3078F DIAST BP <80 MM HG: CPT | Performed by: STUDENT IN AN ORGANIZED HEALTH CARE EDUCATION/TRAINING PROGRAM

## 2025-07-24 PROCEDURE — 1123F ACP DISCUSS/DSCN MKR DOCD: CPT | Performed by: STUDENT IN AN ORGANIZED HEALTH CARE EDUCATION/TRAINING PROGRAM

## 2025-07-24 PROCEDURE — G0439 PPPS, SUBSEQ VISIT: HCPCS | Performed by: STUDENT IN AN ORGANIZED HEALTH CARE EDUCATION/TRAINING PROGRAM

## 2025-07-24 PROCEDURE — 3074F SYST BP LT 130 MM HG: CPT | Performed by: STUDENT IN AN ORGANIZED HEALTH CARE EDUCATION/TRAINING PROGRAM

## 2025-07-24 PROCEDURE — 83036 HEMOGLOBIN GLYCOSYLATED A1C: CPT | Performed by: STUDENT IN AN ORGANIZED HEALTH CARE EDUCATION/TRAINING PROGRAM

## 2025-07-24 PROCEDURE — 99214 OFFICE O/P EST MOD 30 MIN: CPT | Performed by: STUDENT IN AN ORGANIZED HEALTH CARE EDUCATION/TRAINING PROGRAM

## 2025-07-24 PROCEDURE — 1159F MED LIST DOCD IN RCRD: CPT | Performed by: STUDENT IN AN ORGANIZED HEALTH CARE EDUCATION/TRAINING PROGRAM

## 2025-07-24 RX ORDER — GABAPENTIN 300 MG/1
300 CAPSULE ORAL 3 TIMES DAILY
Qty: 270 CAPSULE | Refills: 1 | Status: SHIPPED | OUTPATIENT
Start: 2025-07-24 | End: 2026-01-20

## 2025-07-24 ASSESSMENT — PATIENT HEALTH QUESTIONNAIRE - PHQ9
2. FEELING DOWN, DEPRESSED OR HOPELESS: NOT AT ALL
SUM OF ALL RESPONSES TO PHQ QUESTIONS 1-9: 0
1. LITTLE INTEREST OR PLEASURE IN DOING THINGS: NOT AT ALL
SUM OF ALL RESPONSES TO PHQ QUESTIONS 1-9: 0

## 2025-07-24 ASSESSMENT — LIFESTYLE VARIABLES
HOW MANY STANDARD DRINKS CONTAINING ALCOHOL DO YOU HAVE ON A TYPICAL DAY: 1 OR 2
HOW OFTEN DO YOU HAVE A DRINK CONTAINING ALCOHOL: 2-3 TIMES A WEEK

## 2025-07-24 NOTE — PROGRESS NOTES
Allergies  Meds               The patient (or guardian, if applicable) and other individuals in attendance with the patient were advised that Artificial Intelligence will be utilized during this visit to record and process the conversation to generate a clinical note. The patient (or guardian, if applicable) and other individuals in attendance at the appointment consented to the use of AI, including the recording.

## 2025-07-24 NOTE — PATIENT INSTRUCTIONS
Shortness of breath.     Pain, pressure, or a strange feeling in the back, neck, jaw, or upper belly or in one or both shoulders or arms.     Lightheadedness or sudden weakness.     A fast or irregular heartbeat.   After you call 911, the  may tell you to chew 1 adult-strength or 2 to 4 low-dose aspirin. Wait for an ambulance. Do not try to drive yourself.  Watch closely for changes in your health, and be sure to contact your doctor if you have any problems.  Where can you learn more?  Go to https://www.[employer].net/patientEd and enter F075 to learn more about \"A Healthy Heart: Care Instructions.\"  Current as of: July 31, 2024  Content Version: 14.5  © 1786-0219 [employer].   Care instructions adapted under license by [employer]. If you have questions about a medical condition or this instruction, always ask your healthcare professional. [employer] disclaims any warranty or liability for your use of this information.    Personalized Preventive Plan for Gus Cannon - 7/24/2025  Medicare offers a range of preventive health benefits. Some of the tests and screenings are paid in full while other may be subject to a deductible, co-insurance, and/or copay.  Some of these benefits include a comprehensive review of your medical history including lifestyle, illnesses that may run in your family, and various assessments and screenings as appropriate.  After reviewing your medical record and screening and assessments performed today your provider may have ordered immunizations, labs, imaging, and/or referrals for you.  A list of these orders (if applicable) as well as your Preventive Care list are included within your After Visit Summary for your review.

## (undated) DEVICE — GLOVE ORTHO 7 1/2   MSG9475

## (undated) DEVICE — ELECTRODE PT RET AD L9FT HI MOIST COND ADH HYDRGEL CORDED

## (undated) DEVICE — Device

## (undated) DEVICE — SOLUTION IV IRRIG POUR BRL 0.9% SODIUM CHL 2F7124

## (undated) DEVICE — SPONGE GZ W4XL4IN RAYON POLY FILL CVR W/ NONWOVEN FAB

## (undated) DEVICE — 1 ML TUBERCULIN SYRINGE,DETACHABLE NEEDLE: Brand: MONOJECT

## (undated) DEVICE — CHLORAPREP 26ML ORANGE

## (undated) DEVICE — FORCEPS RAT TOOTH 1X2

## (undated) DEVICE — TOWEL,OR,DSP,ST,BLUE,STD,6/PK,12PK/CS: Brand: MEDLINE

## (undated) DEVICE — GOWN,SIRUS,FABRNF,XL,20/CS: Brand: MEDLINE

## (undated) DEVICE — NEEDLE FLTR 18GA L1.5IN MEM THK5UM BLNT DISP

## (undated) DEVICE — INTENDED FOR TISSUE SEPARATION, AND OTHER PROCEDURES THAT REQUIRE A SHARP SURGICAL BLADE TO PUNCTURE OR CUT.: Brand: BARD-PARKER ® STAINLESS STEEL BLADES

## (undated) DEVICE — 3M™ TEGADERM™ TRANSPARENT FILM DRESSING FRAME STYLE, 1626W, 4 IN X 4-3/4 IN (10 CM X 12 CM), 50/CT 4CT/CASE: Brand: 3M™ TEGADERM™

## (undated) DEVICE — DOUBLE BASIN SET: Brand: MEDLINE INDUSTRIES, INC.

## (undated) DEVICE — BLADE ES ELASTOMERIC COAT INSUL DURABLE BEND UPTO 90DEG

## (undated) DEVICE — NEEDLE HYPO 25GA L1.5IN BLU POLYPR HUB S STL REG BVL STR

## (undated) DEVICE — BLADE CLIPPER GEN PURP NS

## (undated) DEVICE — COVER HNDL LT DISP

## (undated) DEVICE — BINDER ABD UNISX 4 PNL PREM 6INX6INX12IN L XL 4

## (undated) DEVICE — SYRINGE 20ML LL S/C 50

## (undated) DEVICE — RETRACTOR SENN

## (undated) DEVICE — ADHESIVE SKIN CLSR 0.7ML TOP DERMBND ADV

## (undated) DEVICE — SET INSTRUMENT LAP I

## (undated) DEVICE — SHEET, T, LAPAROTOMY, STERILE: Brand: MEDLINE

## (undated) DEVICE — 4-PORT MANIFOLD: Brand: NEPTUNE 2

## (undated) DEVICE — PACK PROCEDURE SURG GEN CUST

## (undated) DEVICE — SYRINGE MED 10ML LUERLOCK TIP W/O SFTY DISP